# Patient Record
Sex: FEMALE | Race: WHITE | NOT HISPANIC OR LATINO | Employment: PART TIME | URBAN - NONMETROPOLITAN AREA
[De-identification: names, ages, dates, MRNs, and addresses within clinical notes are randomized per-mention and may not be internally consistent; named-entity substitution may affect disease eponyms.]

---

## 2017-01-19 ENCOUNTER — HOSPITAL ENCOUNTER (OUTPATIENT)
Dept: LAB | Facility: MEDICAL CENTER | Age: 52
End: 2017-01-19
Attending: INTERNAL MEDICINE
Payer: COMMERCIAL

## 2017-01-19 DIAGNOSIS — E78.5 DYSLIPIDEMIA: ICD-10-CM

## 2017-01-19 DIAGNOSIS — E05.90 HYPERTHYROIDISM: ICD-10-CM

## 2017-01-19 LAB
ALBUMIN SERPL BCP-MCNC: 3.9 G/DL (ref 3.2–4.9)
ALBUMIN/GLOB SERPL: 1.1 G/DL
ALP SERPL-CCNC: 52 U/L (ref 30–99)
ALT SERPL-CCNC: 17 U/L (ref 2–50)
ANION GAP SERPL CALC-SCNC: 8 MMOL/L (ref 0–11.9)
AST SERPL-CCNC: 13 U/L (ref 12–45)
BILIRUB SERPL-MCNC: 0.4 MG/DL (ref 0.1–1.5)
BUN SERPL-MCNC: 18 MG/DL (ref 8–22)
CALCIUM SERPL-MCNC: 8.7 MG/DL (ref 8.5–10.5)
CHLORIDE SERPL-SCNC: 107 MMOL/L (ref 96–112)
CHOLEST SERPL-MCNC: 128 MG/DL (ref 100–199)
CO2 SERPL-SCNC: 24 MMOL/L (ref 20–33)
CREAT SERPL-MCNC: 0.79 MG/DL (ref 0.5–1.4)
GLOBULIN SER CALC-MCNC: 3.4 G/DL (ref 1.9–3.5)
GLUCOSE SERPL-MCNC: 94 MG/DL (ref 65–99)
HDLC SERPL-MCNC: 27 MG/DL
LDLC SERPL CALC-MCNC: 44 MG/DL
POTASSIUM SERPL-SCNC: 4 MMOL/L (ref 3.6–5.5)
PROT SERPL-MCNC: 7.3 G/DL (ref 6–8.2)
SODIUM SERPL-SCNC: 139 MMOL/L (ref 135–145)
T3 SERPL-MCNC: 116.4 NG/DL (ref 60–181)
T4 FREE SERPL-MCNC: 0.78 NG/DL (ref 0.53–1.43)
TRIGL SERPL-MCNC: 285 MG/DL (ref 0–149)
TSH SERPL DL<=0.005 MIU/L-ACNC: 0.71 UIU/ML (ref 0.3–3.7)

## 2017-01-19 PROCEDURE — 80053 COMPREHEN METABOLIC PANEL: CPT

## 2017-01-19 PROCEDURE — 36415 COLL VENOUS BLD VENIPUNCTURE: CPT

## 2017-01-19 PROCEDURE — 84439 ASSAY OF FREE THYROXINE: CPT

## 2017-01-19 PROCEDURE — 84443 ASSAY THYROID STIM HORMONE: CPT

## 2017-01-19 PROCEDURE — 84480 ASSAY TRIIODOTHYRONINE (T3): CPT

## 2017-01-19 PROCEDURE — 80061 LIPID PANEL: CPT

## 2017-01-20 DIAGNOSIS — I10 ESSENTIAL HYPERTENSION: ICD-10-CM

## 2017-01-20 DIAGNOSIS — R52 BURNING PAIN: ICD-10-CM

## 2017-01-20 DIAGNOSIS — I73.00 RAYNAUD'S DISEASE WITHOUT GANGRENE: ICD-10-CM

## 2017-03-14 ENCOUNTER — HOSPITAL ENCOUNTER (OUTPATIENT)
Dept: LAB | Facility: MEDICAL CENTER | Age: 52
End: 2017-03-14
Attending: INTERNAL MEDICINE
Payer: COMMERCIAL

## 2017-03-14 ENCOUNTER — OFFICE VISIT (OUTPATIENT)
Dept: MEDICAL GROUP | Facility: PHYSICIAN GROUP | Age: 52
End: 2017-03-14
Payer: COMMERCIAL

## 2017-03-14 VITALS
TEMPERATURE: 97.2 F | OXYGEN SATURATION: 97 % | SYSTOLIC BLOOD PRESSURE: 124 MMHG | HEART RATE: 82 BPM | RESPIRATION RATE: 16 BRPM | WEIGHT: 293 LBS | BODY MASS INDEX: 47.09 KG/M2 | DIASTOLIC BLOOD PRESSURE: 74 MMHG | HEIGHT: 66 IN

## 2017-03-14 DIAGNOSIS — M25.542 ARTHRALGIA OF BOTH HANDS: ICD-10-CM

## 2017-03-14 DIAGNOSIS — E05.90 SUBCLINICAL HYPERTHYROIDISM: ICD-10-CM

## 2017-03-14 DIAGNOSIS — I10 ESSENTIAL HYPERTENSION: ICD-10-CM

## 2017-03-14 DIAGNOSIS — J45.40 ASTHMA IN ADULT, MODERATE PERSISTENT, UNCOMPLICATED: ICD-10-CM

## 2017-03-14 DIAGNOSIS — Z12.31 VISIT FOR SCREENING MAMMOGRAM: ICD-10-CM

## 2017-03-14 DIAGNOSIS — I73.00 RAYNAUD'S DISEASE WITHOUT GANGRENE: ICD-10-CM

## 2017-03-14 DIAGNOSIS — M25.541 ARTHRALGIA OF BOTH HANDS: ICD-10-CM

## 2017-03-14 DIAGNOSIS — Z12.11 SCREEN FOR COLON CANCER: ICD-10-CM

## 2017-03-14 DIAGNOSIS — R39.15 URINARY URGENCY: ICD-10-CM

## 2017-03-14 DIAGNOSIS — Z23 NEED FOR PNEUMOCOCCAL VACCINATION: ICD-10-CM

## 2017-03-14 DIAGNOSIS — E66.01 MORBID OBESITY WITH BMI OF 45.0-49.9, ADULT (HCC): ICD-10-CM

## 2017-03-14 DIAGNOSIS — R52 BURNING PAIN: ICD-10-CM

## 2017-03-14 LAB
CRP SERPL HS-MCNC: 1.76 MG/DL (ref 0–0.75)
ERYTHROCYTE [SEDIMENTATION RATE] IN BLOOD BY WESTERGREN METHOD: 20 MM/HOUR (ref 0–30)
RHEUMATOID FACT SERPL-ACNC: <10 IU/ML (ref 0–14)

## 2017-03-14 PROCEDURE — 85652 RBC SED RATE AUTOMATED: CPT

## 2017-03-14 PROCEDURE — 36415 COLL VENOUS BLD VENIPUNCTURE: CPT

## 2017-03-14 PROCEDURE — 86140 C-REACTIVE PROTEIN: CPT

## 2017-03-14 PROCEDURE — 86200 CCP ANTIBODY: CPT

## 2017-03-14 PROCEDURE — 90732 PPSV23 VACC 2 YRS+ SUBQ/IM: CPT | Performed by: INTERNAL MEDICINE

## 2017-03-14 PROCEDURE — 86431 RHEUMATOID FACTOR QUANT: CPT

## 2017-03-14 PROCEDURE — 99214 OFFICE O/P EST MOD 30 MIN: CPT | Mod: 25 | Performed by: INTERNAL MEDICINE

## 2017-03-14 PROCEDURE — 90471 IMMUNIZATION ADMIN: CPT | Performed by: INTERNAL MEDICINE

## 2017-03-14 RX ORDER — IBUPROFEN 800 MG/1
800 TABLET ORAL EVERY 8 HOURS PRN
Qty: 90 TAB | Refills: 3 | Status: SHIPPED | OUTPATIENT
Start: 2017-03-14

## 2017-03-14 RX ORDER — LOSARTAN POTASSIUM 50 MG/1
50 TABLET ORAL
Qty: 90 TAB | Refills: 3 | Status: SHIPPED | OUTPATIENT
Start: 2017-03-14 | End: 2017-05-11

## 2017-03-14 RX ORDER — HYDROCODONE BITARTRATE AND ACETAMINOPHEN 5; 325 MG/1; MG/1
1 TABLET ORAL EVERY 8 HOURS PRN
Qty: 60 TAB | Refills: 0 | Status: SHIPPED | OUTPATIENT
Start: 2017-03-14 | End: 2017-12-12

## 2017-03-14 ASSESSMENT — PATIENT HEALTH QUESTIONNAIRE - PHQ9: CLINICAL INTERPRETATION OF PHQ2 SCORE: 0

## 2017-03-14 NOTE — ASSESSMENT & PLAN NOTE
Patient has had problems with urinary urgency. Ditropan is no longer working for her. We discussed referral to urogynecology.

## 2017-03-14 NOTE — PROGRESS NOTES
Chief Complaint   Patient presents with   • Results     fv labs, advil, norco, losartan refill       HISTORY OF PRESENT ILLNESS: Patient is a 51 y.o. female established patient who presents today to be seen for acute and chronic issues.    Joint pain  Patient is a 51-year-old female who comes in today with multiple concerns. She notes that recently she's been waking up with worsening pain of her hands bilaterally and also in her knees. She has an interesting history of autoimmune urticaria. Patient also has a family history of rheumatoid arthritis in her mother. The joints that seems to be most affected are the MCP joints of her hands bilaterally. She notes that can be swollen but they're not typically red. It takes about an hour for them to loosen up. We discussed doing an x-ray of her hands, rheumatoid factor, anti-CCP antibody to assess for rheumatologic cause.    Patient has rare use of hydrocodone. She was last given #120 tabs 2 years ago. I did give her refill today for #60 tabs.    Subclinical hyperthyroidism  Patient has subclinical hyperthyroidism which we have followed. He sent thyroid studies were normal off of medication.    Hypertension  This is a chronic condition which is well controlled on medications. Patient is tolerating medications without side effects.    Urinary urgency  Patient has had problems with urinary urgency. Ditropan is no longer working for her. We discussed referral to urogynecology.    Morbid obesity with BMI of 45.0-49.9, adult ([facility])  Patient is morbidly obese. We discussed continuing to work on dietary changes.      Patient Active Problem List    Diagnosis Date Noted   • Morbid obesity with BMI of 45.0-49.9, adult ([facility]) 03/14/2017   • Chronic cough 12/22/2015   • Urinary urgency 09/22/2015   • Burning pain 09/22/2015   • Graves disease 02/12/2015   • Subclinical hyperthyroidism 07/31/2014   • Multiple thyroid nodules 02/02/2014   • Joint pain 08/27/2013   • Urticaria, chronic  2013   • Hypertension 2013   • Asthma in adult 2013   • Shoulder pain 2013       Allergies:Erythromycin; Keflex; and Pcn    Current Outpatient Prescriptions Ordered in [facility]   Medication Sig Dispense Refill   • ibuprofen (MOTRIN) 800 MG Tab Take 1 Tab by mouth every 8 hours as needed for Mild Pain. 90 Tab 3   • losartan (COZAAR) 50 MG Tab Take 1 Tab by mouth every day. 90 Tab 3   • hydrocodone-acetaminophen (NORCO) 5-325 MG Tab per tablet Take 1 Tab by mouth every 8 hours as needed. 60 Tab 0   • NIFEdipine (ADALAT CC) 60 MG CR tablet Take 1 Tab by mouth every day. 90 Tab 3   • omeprazole (PRILOSEC) 20 MG delayed-release capsule Take 1 Cap by mouth every day. 90 Cap 3   • oxybutynin (DITROPAN) 5 MG Tab Take 1 Tab by mouth 3 times a day. 270 Tab 1   • Albuterol Sulfate (PROAIR HFA INH) Inhale  by mouth.     • beclomethasone (QVAR) 80 MCG/ACT inhaler Inhale 1 Puff by mouth 2 times a day.       • Azelastine-Fluticasone (DYMISTA) 137-50 MCG/ACT Suspension Spray 1 Spray in nose 2 Times a Day. 1 Bottle 0   • meclizine (ANTIVERT) 25 MG Tab Take 1 Tab by mouth 3 times a day as needed for Dizziness. 30 Tab 0   • sennosides-docusate sodium (SENOKOT-S) 8.6-50 MG tablet Take 1 Tab by mouth 2 times a day. 20 Tab 0   • DYMISTA 137-50 MCG/ACT Suspension   6   • guaifenesin-codeine (ROBITUSSIN AC) Solution Take 5 mL by mouth every 6 hours as needed for Cough. 120 mL 0     No current Epic-ordered facility-administered medications on file.       Past Medical History   Diagnosis Date   • Urticaria, chronic 1/3/2013   • Hypertension    • Bursitis of shoulder, left    • Rotator cuff tear, left        Social History   Substance Use Topics   • Smoking status: Former Smoker   • Smokeless tobacco: Never Used   • Alcohol Use: 0.0 oz/week     0 Standard drinks or equivalent per week      Comment: Occassionally       Family Status   Relation Status Death Age   • Mother  77   • Father  86   • Maternal  "Grandmother     • Maternal Grandfather     • Child Alive      Family History   Problem Relation Age of Onset   • Arthritis Mother      RA   • Cancer Mother      breast   • Hypertension Father    • Cancer Maternal Grandmother        ROS:  Review of Systems   Constitutional: Negative for fever and malaise/fatigue.   HENT: Negative for congestion  Respiratory: Negative for cough  Cardiovascular: Negative for chest pain  Gastrointestinal: Negative for nausea, vomiting and abdominal pain.  Musculoskeletal: Positive for joint pain  All other systems reviewed and are negative except as in HPI.      Exam:  Blood pressure 124/74, pulse 82, temperature 36.2 °C (97.2 °F), resp. rate 16, height 1.689 m (5' 6.5\"), weight 138.347 kg (305 lb), SpO2 97 %.  General: Morbidly obese female in NAD  Head is grossly normal.  Neck: Supple without JVD   Pulmonary: Clear to ausculation and percussion.  Normal effort. No rales, ronchi, or wheezing.  Cardiovascular: Regular rate and rhythm without murmur. Carotid and radial pulses are intact and equal bilaterally.  Extremities: no clubbing, cyanosis, or edema.    No visits with results within 1 Month(s) from this visit.  Latest known visit with results is:    Hospital Outpatient Visit on 2017   Component Date Value Ref Range Status   • TSH 2017 0.710  0.300 - 3.700 uIU/mL Final   • Free T-4 2017 0.78  0.53 - 1.43 ng/dL Final   • T3 2017 116.4  60.0 - 181.0 ng/dL Final   • Sodium 2017 139  135 - 145 mmol/L Final   • Potassium 2017 4.0  3.6 - 5.5 mmol/L Final   • Chloride 2017 107  96 - 112 mmol/L Final   • Co2 2017 24  20 - 33 mmol/L Final   • Anion Gap 2017 8.0  0.0 - 11.9 Final   • Glucose 2017 94  65 - 99 mg/dL Final   • Bun 2017 18  8 - 22 mg/dL Final   • Creatinine 2017 0.79  0.50 - 1.40 mg/dL Final   • Calcium 2017 8.7  8.5 - 10.5 mg/dL Final   • AST(SGOT) 2017 13  12 - 45 U/L Final   • " ALT(SGPT) 01/19/2017 17  2 - 50 U/L Final   • Alkaline Phosphatase 01/19/2017 52  30 - 99 U/L Final   • Total Bilirubin 01/19/2017 0.4  0.1 - 1.5 mg/dL Final   • Albumin 01/19/2017 3.9  3.2 - 4.9 g/dL Final   • Total Protein 01/19/2017 7.3  6.0 - 8.2 g/dL Final   • Globulin 01/19/2017 3.4  1.9 - 3.5 g/dL Final   • A-G Ratio 01/19/2017 1.1   Final   • Cholesterol,Tot 01/19/2017 128  100 - 199 mg/dL Final   • Triglycerides 01/19/2017 285* 0 - 149 mg/dL Final   • HDL 01/19/2017 27* >=40 mg/dL Final   • LDL 01/19/2017 44  <100 mg/dL Final   • GFR If  01/19/2017 >60  >60 mL/min/1.73 m 2 Final   • GFR If Non  01/19/2017 >60  >60 mL/min/1.73 m 2 Final       Assessment/Plan:  1. Arthralgia of both hands  RHEUMATOID ARTHRITIS FACTOR    CCP ANTIBODY    WESTERGREN SED RATE    CRP QUANTITIVE (NON-CARDIAC)    DX-JOINT SURVEY-HANDS SINGLE VIEW    ibuprofen (MOTRIN) 800 MG Tab    hydrocodone-acetaminophen (NORCO) 5-325 MG Tab per tablet    Uncontrolled, will check labs   2. Essential hypertension  losartan (COZAAR) 50 MG Tab    Controlled, on medication   3. Urinary urgency  REFERRAL TO UROGYNECOLOGY    Uncontrolled, referral to urogynecology   4. Burning pain      Uncontrolled, potentially Raynolds. Will try increasing calcium channel blocker   5. Raynaud's disease without gangrene     6. Visit for screening mammogram  MA-SCREEN MAMMO W/CAD-BILAT   7. Screen for colon cancer  REFERRAL TO GASTROENTEROLOGY   8. Subclinical hyperthyroidism     9. Asthma in adult, moderate persistent, uncomplicated     10. Need for pneumococcal vaccination  PneumoVax PPV23 =>1yo   11. Morbid obesity with BMI of 45.0-49.9, adult ([facility])  Patient identified as having weight management issue.  Appropriate orders and counseling given.     Please note that this dictation was created using voice recognition software. I have made every reasonable attempt to correct obvious errors, but I expect that there are errors of  grammar and possibly content that I did not discover before finalizing the note.

## 2017-03-14 NOTE — PATIENT INSTRUCTIONS
1. Referred for colonoscopy for this summer.    2. Mammogram when you can.    3. Joint pain labs at your convenience.    4. Medications refilled.    5. Follow up in 1 year or sooner as needed.

## 2017-03-14 NOTE — ASSESSMENT & PLAN NOTE
Patient is a 51-year-old female who comes in today with multiple concerns. She notes that recently she's been waking up with worsening pain of her hands bilaterally and also in her knees. She has an interesting history of autoimmune urticaria. Patient also has a family history of rheumatoid arthritis in her mother. The joints that seems to be most affected are the MCP joints of her hands bilaterally. She notes that can be swollen but they're not typically red. It takes about an hour for them to loosen up. We discussed doing an x-ray of her hands, rheumatoid factor, anti-CCP antibody to assess for rheumatologic cause.    Patient has rare use of hydrocodone. She was last given #120 tabs 2 years ago. I did give her refill today for #60 tabs.

## 2017-03-14 NOTE — MR AVS SNAPSHOT
"        Gabriella Clifford   3/14/2017 7:40 AM   Office Visit   MRN: [id number]    Department:  [facility]   Dept Phone:  [phone]    Description:  Female : 1965   Provider:  Sarah Dunn M.D.           Reason for Visit     Results fv labs, advil, norco, losartan refill      Allergies as of 3/14/2017     Allergen Noted Reactions    Erythromycin 2013       Keflex 2013       Pcn [Penicillins] 2013         You were diagnosed with     Arthralgia of both hands   [6445124]       Essential hypertension   [7538794]       Urinary urgency   [109450]       Burning pain   [743004]   Uncontrolled, potentially Raynolds. Will try increasing calcium channel blocker    Raynaud's disease without gangrene   [6639548]       Visit for screening mammogram   [135040]       Screen for colon cancer   [699175]       Subclinical hyperthyroidism   [207117]       Asthma in adult, moderate persistent, uncomplicated   [4536770]       Need for pneumococcal vaccination   [502871]         Vital Signs     Blood Pressure Pulse Temperature Respirations Height Weight    124/74 mmHg 82 36.2 °C (97.2 °F) 16 1.689 m (5' 6.5\") 138.347 kg (305 lb)    Body Mass Index Oxygen Saturation Smoking Status             48.50 kg/m2 97% Former Smoker         Basic Information     Date Of Birth Sex Race Ethnicity Preferred Language    1965 Female White Non- English      Your appointments     2017  1:00 PM   NEW TO YOU with VALERIE Lopez   [address] [address] [phone]              Problem List              ICD-10-CM Priority Class Noted - Resolved    Urticaria, chronic L50.8   1/3/2013 - Present    Hypertension I10   1/3/2013 - Present    Asthma in adult J45.909   1/3/2013 - Present    Shoulder pain M25.519   1/3/2013 - Present    Joint pain M25.50   2013 - Present    Hyperthyroidism E05.90   2014 - Present    Multiple " thyroid nodules E04.2   2/2/2014 - Present    Subclinical hyperthyroidism E05.90   7/31/2014 - Present    Graves disease E05.00   2/12/2015 - Present    Urinary urgency R39.15   9/22/2015 - Present    Burning pain R52   9/22/2015 - Present    Chronic cough R05   12/22/2015 - Present      Health Maintenance        Date Due Completion Dates    IMM DTaP/Tdap/Td Vaccine (1 - Tdap) 8/9/1985 ---    IMM PNEUMOCOCCAL 19-64 (ADULT) MEDIUM RISK SERIES (1 of 1 - PPSV23) 8/9/1985 ---    MAMMOGRAM 4/30/2015 4/30/2014, 1/17/2013, 1/3/2005 (N/S)    Override on 1/3/2005: (N/S)    COLONOSCOPY 8/9/2016 ---    IMM INFLUENZA (1) 9/1/2016 10/15/2014, 10/30/2013    PAP SMEAR 3/2/2017 3/2/2014 (Prv Comp)    Override on 3/2/2014: Previously completed            Current Immunizations     Influenza TIV (IM) 10/30/2013  7:51 AM    Influenza Vaccine Quad Inj (Pf) 10/15/2014    Pneumococcal polysaccharide vaccine (PPSV-23)  Incomplete      Below and/or attached are the medications your provider expects you to take. Review all of your home medications and newly ordered medications with your provider and/or pharmacist. Follow medication instructions as directed by your provider and/or pharmacist. Please keep your medication list with you and share with your provider. Update the information when medications are discontinued, doses are changed, or new medications (including over-the-counter products) are added; and carry medication information at all times in the event of emergency situations     Allergies:  ERYTHROMYCIN - (reactions not documented)     KEFLEX - (reactions not documented)     PCN - (reactions not documented)               Medications  Valid as of: March 14, 2017 -  8:10 AM    Generic Name Brand Name Tablet Size Instructions for use    Albuterol Sulfate   Inhale  by mouth.        Azelastine-Fluticasone (Suspension) DYMISTA 137-50 MCG/ACT         Azelastine-Fluticasone (Suspension) Azelastine-Fluticasone 137-50 MCG/ACT Spray 1 Spray  in nose 2 Times a Day.        Beclomethasone Dipropionate (Aero Soln) QVAR 80 MCG/ACT Inhale 1 Puff by mouth 2 times a day.          Guaifenesin-Codeine (Solution) ROBITUSSIN -10 mg/5mL Take 5 mL by mouth every 6 hours as needed for Cough.        Hydrocodone-Acetaminophen (Tab) NORCO 5-325 MG Take 1 Tab by mouth every 8 hours as needed.        Ibuprofen (Tab) MOTRIN 800 MG Take 1 Tab by mouth every 8 hours as needed for Mild Pain.        Losartan Potassium (Tab) COZAAR 50 MG Take 1 Tab by mouth every day.        Meclizine HCl (Tab) ANTIVERT 25 MG Take 1 Tab by mouth 3 times a day as needed for Dizziness.        NIFEdipine (TABLET SR 24 HR) ADALAT CC 60 MG Take 1 Tab by mouth every day.        Omeprazole (CAPSULE DELAYED RELEASE) PRILOSEC 20 MG Take 1 Cap by mouth every day.        Oxybutynin Chloride (Tab) DITROPAN 5 MG Take 1 Tab by mouth 3 times a day.        Sennosides-Docusate Sodium (Tab) SENOKOT-S 8.6-50 MG Take 1 Tab by mouth 2 times a day.        .                 Medicines prescribed today were sent to:     [id number] [address] [place] [address] [id number]  AT [address] [place] [id number] [facility] [address]    Phone: [phone] Fax: [phone]    Open 24 Hours?: No      Medication refill instructions:       If your prescription bottle indicates you have medication refills left, it is not necessary to call your provider’s office. Please contact your pharmacy and they will refill your medication.    If your prescription bottle indicates you do not have any refills left, you may request refills at any time through one of the following ways: The online [employer] system (except Urgent Care), by calling your provider’s office, or by asking your pharmacy to contact your provider’s office with a refill request. Medication refills are processed only during regular business hours and may not be available until the next business day. Your provider may request additional  information or to have a follow-up visit with you prior to refilling your medication.   *Please Note: Medication refills are assigned a new Rx number when refilled electronically. Your pharmacy may indicate that no refills were authorized even though a new prescription for the same medication is available at the pharmacy. Please request the medicine by name with the pharmacy before contacting your provider for a refill.        Your To Do List     Future Labs/Procedures Complete By Expires    CCP ANTIBODY  As directed 9/11/2017    CRP QUANTITIVE (NON-CARDIAC)  As directed 9/11/2017    DX-JOINT SURVEY-HANDS SINGLE VIEW  As directed 3/14/2018    MA-SCREEN MAMMO W/CAD-BILAT  As directed 3/15/2018    RHEUMATOID ARTHRITIS FACTOR  As directed 3/14/2018    WESTERGREN SED RATE  As directed 3/14/2018      Referral     A referral request has been sent to our patient care coordination department. Please allow 3-5 business days for us to process this request and contact you either by phone or mail. If you do not hear from us by the 5th business day, please call us at [phone].        Instructions    1. Referred for colonoscopy for this summer.    2. Mammogram when you can.    3. Joint pain labs at your convenience.    4. Medications refilled.    5. Follow up in 1 year or sooner as needed.       Other Notes About Your Plan     Last UDS:  2/12/15  DR RICCI  Contolled Substance agreement signed:  2/12/15  DR KEIRY Sheriff Access Code: Activation code not generated  Current [employer] Status: Active

## 2017-03-14 NOTE — ASSESSMENT & PLAN NOTE
Patient has subclinical hyperthyroidism which we have followed. He sent thyroid studies were normal off of medication.

## 2017-03-16 ENCOUNTER — HOSPITAL ENCOUNTER (OUTPATIENT)
Dept: RADIOLOGY | Facility: MEDICAL CENTER | Age: 52
End: 2017-03-16
Attending: INTERNAL MEDICINE
Payer: COMMERCIAL

## 2017-03-16 DIAGNOSIS — M25.541 ARTHRALGIA OF BOTH HANDS: ICD-10-CM

## 2017-03-16 DIAGNOSIS — M25.542 ARTHRALGIA OF BOTH HANDS: ICD-10-CM

## 2017-03-16 LAB — CCP IGG SERPL-ACNC: 3 UNITS (ref 0–19)

## 2017-03-16 PROCEDURE — 77077 JOINT SURVEY SINGLE VIEW: CPT

## 2017-03-18 ENCOUNTER — PATIENT MESSAGE (OUTPATIENT)
Dept: MEDICAL GROUP | Facility: PHYSICIAN GROUP | Age: 52
End: 2017-03-18

## 2017-03-22 ENCOUNTER — TELEPHONE (OUTPATIENT)
Dept: MEDICAL GROUP | Facility: PHYSICIAN GROUP | Age: 52
End: 2017-03-22

## 2017-03-22 DIAGNOSIS — M25.541 ARTHRALGIA OF BOTH HANDS: ICD-10-CM

## 2017-03-22 DIAGNOSIS — M25.542 ARTHRALGIA OF BOTH HANDS: ICD-10-CM

## 2017-03-22 DIAGNOSIS — R79.82 ELEVATED C-REACTIVE PROTEIN (CRP): ICD-10-CM

## 2017-03-22 NOTE — TELEPHONE ENCOUNTER
Patient called regarding the [employer] message she sent on 3/18/17. Please review.

## 2017-03-23 ENCOUNTER — PATIENT MESSAGE (OUTPATIENT)
Dept: MEDICAL GROUP | Facility: PHYSICIAN GROUP | Age: 52
End: 2017-03-23

## 2017-03-23 NOTE — TELEPHONE ENCOUNTER
From: Gabriella Clifford  To: Sarah Dunn M.D.  Sent: 3/18/2017 8:32 AM PDT  Subject: Test Result Question    If my test results are not likely to be RA then what is this one? Is it from a different autoimmune issue?     Stat C-Reactive Protein 1.76 mg/dL 0.00 - 0.75 mg/dL H    My graves, asthma, or autoimmune urticaria? Osteoarthritis says that joints swell or get painful after extended activity. I wake up with swollen and red painful knuckles and other joints with after no activity. When it is windy or when the pressure is changing due to storms it is worse.

## 2017-03-23 NOTE — TELEPHONE ENCOUNTER
Please inform patient that I reviewed her labs and x-ray. Patient was concerned regarding autoimmune disorder. Her joint survey was negative. Her CRP was elevated. Which indicates inflammation.   It remains that she has joint pain without evidence of joint destruction which is a good thing. This is related to her autoimmune - I am not sure. It is a fine point. I would like her to see rheumatology in the future. And please tell her I am happy to make the referral if that's she wants. As Dr. Dunn will not be here for follow-up.

## 2017-03-24 NOTE — TELEPHONE ENCOUNTER
From: Airam Clifford  To: NAT Monahan  Sent: 3/23/2017 7:39 PM PDT  Subject: Test Result Question    I spoke with your assistant. As I told her, yes I would like that referral. Thank you so much!!!  ----- Message -----  From: NAT Monahan  Sent: 3/23/2017 2:51 PM PDT  To: Airam Clifford  Subject: RE: Test Result Question    Dear Airam,  As you well know  is on maternity leave. We're trying to cover her messages currently. I have sent a message to the medical assistant to call you, but will try to address here in this message.  It is unclear why your CRP is elevated. It could be related to many factors. It appears that you do not have any single joint destruction despite having the pain which is a good thing. If you would like to be referred to rheumatology who can explore more of the auto immune properties I would be happy to do that for you.  Laney Christie      ----- Message -----   From: AIRAM CLIFFORD   Sent: 3/18/2017 8:32 AM PDT   To: Sarah Dunn M.D.  Subject: Test Result Question    If my test results are not likely to be RA then what is this one? Is it from a different autoimmune issue?     Stat C-Reactive Protein 1.76 mg/dL 0.00 - 0.75 mg/dL H    My graves, asthma, or autoimmune urticaria? Osteoarthritis says that joints swell or get painful after extended activity. I wake up with swollen and red painful knuckles and other joints with after no activity. When it is windy or when the pressure is changing due to storms it is worse.

## 2017-04-18 ENCOUNTER — APPOINTMENT (OUTPATIENT)
Dept: RADIOLOGY | Facility: IMAGING CENTER | Age: 52
End: 2017-04-18
Attending: INTERNAL MEDICINE
Payer: COMMERCIAL

## 2017-04-18 ENCOUNTER — OFFICE VISIT (OUTPATIENT)
Dept: RHEUMATOLOGY | Facility: PHYSICIAN GROUP | Age: 52
End: 2017-04-18
Payer: COMMERCIAL

## 2017-04-18 ENCOUNTER — NON-PROVIDER VISIT (OUTPATIENT)
Dept: URGENT CARE | Facility: PHYSICIAN GROUP | Age: 52
End: 2017-04-18
Payer: COMMERCIAL

## 2017-04-18 ENCOUNTER — HOSPITAL ENCOUNTER (OUTPATIENT)
Dept: LAB | Facility: MEDICAL CENTER | Age: 52
End: 2017-04-18
Attending: INTERNAL MEDICINE
Payer: COMMERCIAL

## 2017-04-18 VITALS
HEART RATE: 76 BPM | TEMPERATURE: 98.9 F | HEIGHT: 67 IN | WEIGHT: 293 LBS | RESPIRATION RATE: 16 BRPM | OXYGEN SATURATION: 93 % | BODY MASS INDEX: 45.99 KG/M2 | DIASTOLIC BLOOD PRESSURE: 90 MMHG | SYSTOLIC BLOOD PRESSURE: 132 MMHG

## 2017-04-18 DIAGNOSIS — I10 ESSENTIAL HYPERTENSION: ICD-10-CM

## 2017-04-18 DIAGNOSIS — M25.50 POLYARTHRALGIA: ICD-10-CM

## 2017-04-18 DIAGNOSIS — Z79.899 ENCOUNTER FOR LONG-TERM (CURRENT) USE OF HIGH-RISK MEDICATION: ICD-10-CM

## 2017-04-18 DIAGNOSIS — E05.00 GRAVES DISEASE: ICD-10-CM

## 2017-04-18 DIAGNOSIS — I10 UNSPECIFIED ESSENTIAL HYPERTENSION: ICD-10-CM

## 2017-04-18 DIAGNOSIS — R70.0 ESR RAISED: ICD-10-CM

## 2017-04-18 DIAGNOSIS — R79.82 CRP ELEVATED: ICD-10-CM

## 2017-04-18 LAB
ALBUMIN SERPL BCP-MCNC: 4.1 G/DL (ref 3.2–4.9)
ALBUMIN/GLOB SERPL: 1.2 G/DL
ALP SERPL-CCNC: 55 U/L (ref 30–99)
ALT SERPL-CCNC: 20 U/L (ref 2–50)
ANION GAP SERPL CALC-SCNC: 11 MMOL/L (ref 0–11.9)
APPEARANCE UR: CLEAR
AST SERPL-CCNC: 14 U/L (ref 12–45)
BILIRUB SERPL-MCNC: 0.3 MG/DL (ref 0.1–1.5)
BILIRUB UR QL STRIP.AUTO: NEGATIVE
BUN SERPL-MCNC: 22 MG/DL (ref 8–22)
C3 SERPL-MCNC: 184 MG/DL (ref 87–200)
C4 SERPL-MCNC: 48 MG/DL (ref 19–52)
CALCIUM SERPL-MCNC: 9.5 MG/DL (ref 8.5–10.5)
CHLORIDE SERPL-SCNC: 104 MMOL/L (ref 96–112)
CO2 SERPL-SCNC: 24 MMOL/L (ref 20–33)
COLOR UR: NORMAL
CREAT SERPL-MCNC: 0.86 MG/DL (ref 0.5–1.4)
CRP SERPL HS-MCNC: 1.76 MG/DL (ref 0–0.75)
GFR SERPL CREATININE-BSD FRML MDRD: >60 ML/MIN/1.73 M 2
GLOBULIN SER CALC-MCNC: 3.3 G/DL (ref 1.9–3.5)
GLUCOSE SERPL-MCNC: 155 MG/DL (ref 65–99)
GLUCOSE UR STRIP.AUTO-MCNC: NEGATIVE MG/DL
KETONES UR STRIP.AUTO-MCNC: NEGATIVE MG/DL
LEUKOCYTE ESTERASE UR QL STRIP.AUTO: NEGATIVE
MICRO URNS: NORMAL
NITRITE UR QL STRIP.AUTO: NEGATIVE
PH UR STRIP.AUTO: 5.5 [PH]
POTASSIUM SERPL-SCNC: 3.5 MMOL/L (ref 3.6–5.5)
PROT SERPL-MCNC: 7.4 G/DL (ref 6–8.2)
PROT UR QL STRIP: NEGATIVE MG/DL
RBC UR QL AUTO: NEGATIVE
SODIUM SERPL-SCNC: 139 MMOL/L (ref 135–145)
SP GR UR STRIP.AUTO: 1.02
URATE SERPL-MCNC: 3.1 MG/DL (ref 1.9–8.2)

## 2017-04-18 PROCEDURE — 85652 RBC SED RATE AUTOMATED: CPT

## 2017-04-18 PROCEDURE — 81003 URINALYSIS AUTO W/O SCOPE: CPT

## 2017-04-18 PROCEDURE — 86255 FLUORESCENT ANTIBODY SCREEN: CPT

## 2017-04-18 PROCEDURE — 86235 NUCLEAR ANTIGEN ANTIBODY: CPT | Mod: 91

## 2017-04-18 PROCEDURE — 84550 ASSAY OF BLOOD/URIC ACID: CPT

## 2017-04-18 PROCEDURE — 86225 DNA ANTIBODY NATIVE: CPT

## 2017-04-18 PROCEDURE — 80053 COMPREHEN METABOLIC PANEL: CPT

## 2017-04-18 PROCEDURE — 86160 COMPLEMENT ANTIGEN: CPT

## 2017-04-18 PROCEDURE — 86747 PARVOVIRUS ANTIBODY: CPT | Mod: 91

## 2017-04-18 PROCEDURE — 86332 IMMUNE COMPLEX ASSAY: CPT

## 2017-04-18 PROCEDURE — 82955 ASSAY OF G6PD ENZYME: CPT

## 2017-04-18 PROCEDURE — 85025 COMPLETE CBC W/AUTO DIFF WBC: CPT

## 2017-04-18 PROCEDURE — 83516 IMMUNOASSAY NONANTIBODY: CPT

## 2017-04-18 PROCEDURE — 99244 OFF/OP CNSLTJ NEW/EST MOD 40: CPT | Performed by: INTERNAL MEDICINE

## 2017-04-18 PROCEDURE — 86038 ANTINUCLEAR ANTIBODIES: CPT

## 2017-04-18 PROCEDURE — 86162 COMPLEMENT TOTAL (CH50): CPT

## 2017-04-18 PROCEDURE — 86140 C-REACTIVE PROTEIN: CPT

## 2017-04-18 PROCEDURE — 36415 COLL VENOUS BLD VENIPUNCTURE: CPT

## 2017-04-18 PROCEDURE — 77077 JOINT SURVEY SINGLE VIEW: CPT | Mod: 26 | Performed by: EMERGENCY MEDICINE

## 2017-04-18 NOTE — MR AVS SNAPSHOT
"        Gabriella Clifford   2017 1:30 PM   Office Visit   MRN: [id number]    Department:  Rheumatology Med [facility]   Dept Phone:  [phone]    Description:  Female : 1965   Provider:  Maylin Willingham M.D.           Reason for Visit     New Patient new patient       Allergies as of 2017     Allergen Noted Reactions    Erythromycin 2013       Keflex 2013       Pcn [Penicillins] 2013         You were diagnosed with     Essential hypertension   [5211228]       Graves disease   [777980]       ESR raised   [983293]       CRP elevated   [899847]       Polyarthralgia   [371163]       Encounter for long-term (current) use of high-risk medication   [637970]         Vital Signs     Blood Pressure Pulse Temperature Respirations Height Weight    132/90 mmHg 76 37.2 °C (98.9 °F) 16 1.689 m (5' 6.5\") 141.069 kg (311 lb)    Body Mass Index Oxygen Saturation Last Menstrual Period Breastfeeding? Smoking Status       49.45 kg/m2 93% 2016 No Former Smoker       Basic Information     Date Of Birth Sex Race Ethnicity Preferred Language    1965 Female White Non- English      Your appointments     2017  1:00 PM   NEW TO YOU with VALERIE Lopez   [facility]    [address] [place] [address] [phone]            May 08, 2017  1:30 PM   Follow Up Visit with Maylin Willingham M.D.   [facility] (--)    [address]  [facility] [address] [phone]           You will be receiving a confirmation call a few days before your appointment from our automated call confirmation system.            May 23, 2017  1:00 PM   Telemedicine Clinic New Patient with Brenda Louis M.D., TELEMED GYN, TELEMEDICINE [place]   Centralized Scheduling (--)    [address]  [facility] [address] [phone]              Problem List              ICD-10-CM Priority Class Noted - Resolved    Urticaria, chronic L50.8   1/3/2013 - " Present    Hypertension I10   1/3/2013 - Present    Asthma in adult J45.909   1/3/2013 - Present    Shoulder pain M25.519   1/3/2013 - Present    Joint pain M25.50   8/27/2013 - Present    Multiple thyroid nodules E04.2   2/2/2014 - Present    Subclinical hyperthyroidism E05.90   7/31/2014 - Present    Graves disease E05.00   2/12/2015 - Present    Urinary urgency R39.15   9/22/2015 - Present    Burning pain R52   9/22/2015 - Present    Chronic cough R05   12/22/2015 - Present    Morbid obesity with BMI of 45.0-49.9, adult ([facility]) E66.01, Z68.42   3/14/2017 - Present      Health Maintenance        Date Due Completion Dates    IMM DTaP/Tdap/Td Vaccine (1 - Tdap) 8/9/1985 ---    MAMMOGRAM 4/30/2015 4/30/2014, 1/17/2013, 1/3/2005 (N/S)    Override on 1/3/2005: (N/S)    COLONOSCOPY 8/9/2016 ---    PAP SMEAR 3/2/2017 3/2/2014 (Prv Comp)    Override on 3/2/2014: Previously completed            Current Immunizations     Influenza TIV (IM) 10/30/2013  7:51 AM    Influenza Vaccine Quad Inj (Pf) 10/15/2014    Pneumococcal polysaccharide vaccine (PPSV-23) 3/14/2017      Below and/or attached are the medications your provider expects you to take. Review all of your home medications and newly ordered medications with your provider and/or pharmacist. Follow medication instructions as directed by your provider and/or pharmacist. Please keep your medication list with you and share with your provider. Update the information when medications are discontinued, doses are changed, or new medications (including over-the-counter products) are added; and carry medication information at all times in the event of emergency situations     Allergies:  ERYTHROMYCIN - (reactions not documented)     KEFLEX - (reactions not documented)     PCN - (reactions not documented)               Medications  Valid as of: April 18, 2017 -  2:15 PM    Generic Name Brand Name Tablet Size Instructions for use    Albuterol Sulfate   Inhale  by mouth.         Azelastine-Fluticasone (Suspension) DYMISTA 137-50 MCG/ACT         Azelastine-Fluticasone (Suspension) Azelastine-Fluticasone 137-50 MCG/ACT Spray 1 Spray in nose 2 Times a Day.        Beclomethasone Dipropionate (Aero Soln) QVAR 80 MCG/ACT Inhale 1 Puff by mouth 2 times a day.          Guaifenesin-Codeine (Solution) ROBITUSSIN -10 mg/5mL Take 5 mL by mouth every 6 hours as needed for Cough.        Hydrocodone-Acetaminophen (Tab) NORCO 5-325 MG Take 1 Tab by mouth every 8 hours as needed.        Ibuprofen (Tab) MOTRIN 800 MG Take 1 Tab by mouth every 8 hours as needed for Mild Pain.        Losartan Potassium (Tab) COZAAR 50 MG Take 1 Tab by mouth every day.        Meclizine HCl (Tab) ANTIVERT 25 MG Take 1 Tab by mouth 3 times a day as needed for Dizziness.        NIFEdipine (TABLET SR 24 HR) ADALAT CC 60 MG Take 1 Tab by mouth every day.        Omeprazole (CAPSULE DELAYED RELEASE) PRILOSEC 20 MG Take 1 Cap by mouth every day.        Oxybutynin Chloride (Tab) DITROPAN 5 MG Take 1 Tab by mouth 3 times a day.        Sennosides-Docusate Sodium (Tab) SENOKOT-S 8.6-50 MG Take 1 Tab by mouth 2 times a day.        .                 Medicines prescribed today were sent to:     [facility] [id number] [place] [address] [address] [place] [address] AT [facility] [id number] [facility] [address]    Phone: [phone] Fax: [phone]    Open 24 Hours?: No      Medication refill instructions:       If your prescription bottle indicates you have medication refills left, it is not necessary to call your provider’s office. Please contact your pharmacy and they will refill your medication.    If your prescription bottle indicates you do not have any refills left, you may request refills at any time through one of the following ways: The online [employer] system (except Urgent Care), by calling your provider’s office, or by asking your pharmacy to contact your provider’s office with a refill request.  Medication refills are processed only during regular business hours and may not be available until the next business day. Your provider may request additional information or to have a follow-up visit with you prior to refilling your medication.   *Please Note: Medication refills are assigned a new Rx number when refilled electronically. Your pharmacy may indicate that no refills were authorized even though a new prescription for the same medication is available at the pharmacy. Please request the medicine by name with the pharmacy before contacting your provider for a refill.        Your To Do List     Future Labs/Procedures Complete By Expires    CRP QUANTITIVE (NON-CARDIAC)  4/30/2017 4/18/2018    AFRICA TITER  As directed 4/18/2018    ANTI SCL-70 ABS  As directed 4/18/2018    ANTI-DNA (DS)  As directed 4/18/2018    ANTI-SANJUANA PANEL  As directed 4/18/2018    ANTI-NEUTROPHIL CYTOPLASMIC AB  As directed 4/18/2018    ANTI-PROTEINASE 3 (NJ-3) ABS  As directed 4/18/2018    CBC WITH DIFFERENTIAL  As directed 4/18/2018    CENTROMERE AB, IGG  As directed 4/18/2018    COMP METABOLIC PANEL  As directed 4/18/2018    COMPLEMENT C3  As directed 4/18/2018    COMPLEMENT C4  As directed 4/18/2018    COMPLEMENT TOTAL (CH50)  As directed 4/18/2018    DX-JOINT SURVEY-FEET SINGLE VIEW  As directed 4/18/2018    G6PD QUANT + RBC  As directed 4/18/2018    PARVOVIRUS B19 HUMAN IGG/IGM SERUM  As directed 4/18/2018    URIC ACID  As directed 4/18/2018    URINALYSIS  As directed 4/18/2018    WESTERGREN SED RATE  As directed 4/18/2018      Other Notes About Your Plan     Last UDS:  2/12/15  DR RICCI  Contolled Substance agreement signed:  2/12/15  DR RICCI               [employer] Access Code: Activation code not generated  Current [employer] Status: Active

## 2017-04-18 NOTE — Clinical Note
[facility]   [address], [address]  [place] [address]  Phone: [phone]  Fax: [phone]              Encounter Date: 4/18/2017    Dear Dr. Dunn,    It was a pleasure seeing your patient, Gabriella Clifford, on 4/18/2017. Diagnoses of Essential hypertension, Graves disease, ESR raised, CRP elevated, Polyarthralgia, and Encounter for long-term (current) use of high-risk medication were pertinent to this visit.     Please find attached progress note which includes the history I obtained from Ms. Clifford, my physical examination findings, my impression and recommendations.      Once again, it was a pleasure participating in your patient's care.  Please feel free to contact me if you have any questions or if I can be of any further assistance to your patients.      Sincerely,    Maylin Willingham M.D.  Electronically Signed          PROGRESS NOTE:  Chief Complaint- polyarthralgia    Chief Complaint   Patient presents with   • New Patient     new patient      Gabriella Clifford is a 51 y.o. female here as a new Rheumatology Consult referred by Sarah Dunn M.D. for consultation regarding arthralgias and elevated CRP    HPI:   Ms. Gabriella Clifford is a 51 y.o. female who presents with a history of subclinical hyperthyroidism, hypertension as well as bilateral hand arthralgias and also autoimmune urticaria. She was previously placed on Plaquenil to manage her autoimmune urticaria (described as lesions of hives that would last 1-2 days that is non scarring)but that did not help. To manage her urticaria she was on prednisone for a prolonged period.    She notes that she has been waking up with worsening hand pain bilateral as well as knee pain.  SHe has managed her pain with 800 mg ibuprofen and mainly takes it at night.  She has had bilateral bursitis requiring and takes Norco to assist with her pain.    Pertinent serologies  Rheumatoid factor is negative  Anti-CCP is negative    Evaluation showed  an elevated CRP of 1.76 and a elevated ESR of 20    Hand x-ray from March 16, 2017 showed no evidence of any marginal erosions or periosteal reaction.    Past medical history: Graves, autoimmune urticaria, asthma, high blood pressure diagnosed in 1995.  No miscarriages.  She did have HTN/toxemia during her second pregnancy.  She denies any blood clot history.  Adult onset asthma. She had jaw surgery in middle school. In the 1990s she had tubal ligation. 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      Family history: Rheumatoid arthritis, mom and great aunt had arthritis. First cousin had thyroid Hashimoto's. Father had brain aneurysm.    Social history: Former smoker (social smoker once a week if any), occasional alcohol use. No IV drug use.    She admits to morning stiffness lasting 1-2 hours with swollen joints and stiff hands. She denies any unusual hair loss. She denies photosensitivity to light. She does admit to a history of Raynaud's (this is triggered with cold weather). She also admits to night sweats (she is going to menopause).  She also notices this in her toes and achilles tendon.  Noted as autoimmune urticaria.  She admits to fatigue and headaches and dizziness that is seasonal related to allergies. She admits to muscle spasms as well as a little loss of hearing. She also notes some dizziness and fluid in the ear.  She was referred to ENT and was told she needs to get an MRI. She has a history of hives (this started at age 21). She admits to sinus trouble and drainage in the back of her throat. She admits to bleeding gums. Sore throat sometimes. She admits to dysphagia. She was to shortness of breath. She has urinary frequency worse at night. She denies any nausea vomiting or ulcers. She denies any blood in stools or food intolerance.She reports dry eyes.  She on occoassion has had a blister and burn- skin sensitivity.     She reports red  eyes and no hair loss.    She denies any heart murmur but does have night sweats, cough as well as wheezing.      Current medicines (including changes today)  Current Outpatient Prescriptions   Medication Sig Dispense Refill   • ibuprofen (MOTRIN) 800 MG Tab Take 1 Tab by mouth every 8 hours as needed for Mild Pain. 90 Tab 3   • losartan (COZAAR) 50 MG Tab Take 1 Tab by mouth every day. 90 Tab 3   • hydrocodone-acetaminophen (NORCO) 5-325 MG Tab per tablet Take 1 Tab by mouth every 8 hours as needed. 60 Tab 0   • NIFEdipine (ADALAT CC) 60 MG CR tablet Take 1 Tab by mouth every day. 90 Tab 3   • DYMISTA 137-50 MCG/ACT Suspension   6   • Albuterol Sulfate (PROAIR HFA INH) Inhale  by mouth.     • Azelastine-Fluticasone (DYMISTA) 137-50 MCG/ACT Suspension Spray 1 Spray in nose 2 Times a Day. 1 Bottle 0   • meclizine (ANTIVERT) 25 MG Tab Take 1 Tab by mouth 3 times a day as needed for Dizziness. 30 Tab 0   • omeprazole (PRILOSEC) 20 MG delayed-release capsule Take 1 Cap by mouth every day. 90 Cap 3   • oxybutynin (DITROPAN) 5 MG Tab Take 1 Tab by mouth 3 times a day. 270 Tab 1   • sennosides-docusate sodium (SENOKOT-S) 8.6-50 MG tablet Take 1 Tab by mouth 2 times a day. 20 Tab 0   • guaifenesin-codeine (ROBITUSSIN AC) Solution Take 5 mL by mouth every 6 hours as needed for Cough. 120 mL 0   • beclomethasone (QVAR) 80 MCG/ACT inhaler Inhale 1 Puff by mouth 2 times a day.         No current facility-administered medications for this visit.     She  has a past medical history of Urticaria, chronic (1/3/2013); Hypertension; Bursitis of shoulder, left; and Rotator cuff tear, left. She also has no past medical history of Breast cancer (CMS-[facility]).  She  has past surgical history that includes rotator cuff repair; carpal tunnel release; tubal coagulation laparoscopic bilateral; shoulder surgery; cubital tunnel release; carpal tunnel release; and other.  Family History   Problem Relation Age of Onset   • Arthritis Mother      RA    "  • Cancer Mother      breast   • Hypertension Father    • Cancer Maternal Grandmother      Family Status   Relation Status Death Age   • Mother  77   • Father  86   • Maternal Grandmother     • Maternal Grandfather     • Child Alive      Social History   Substance Use Topics   • Smoking status: Former Smoker   • Smokeless tobacco: Never Used   • Alcohol Use: 0.0 oz/week     0 Standard drinks or equivalent per week      Comment: Occassionally     Social History     Social History Narrative         ROS  Constitutional ROS: Positive for fatigue , sweats   Eye ROS: No eye pain, redness, discharge  Ear ROS: No recent change in hearing  Mouth/Throat ROS: No recent change in voice or hoarseness  Neck ROS: No lumps or masses  Pulmonary ROS: Positive for cough , wheezing on expiration managed with inhalers  Cardiovascular ROS: No chest pain, Positive for dyspnea on exertion   Gastrointestinal ROS: No abdominal pain  Musculoskeletal/Extremities ROS: Positive for arthritis   Hematologic/Lymphatic ROS: No chills  Skin/Integumentary ROS: color, texture and temperature normal.  She has a history of hives.  Neurologic ROS: Normal development, she does report chronic numbness on the 5th digit bilateral  Psychiatry ROS: no depression  Endocrine ROS: grave's disease     Objective:     Blood pressure 132/90, pulse 76, temperature 37.2 °C (98.9 °F), resp. rate 16, height 1.689 m (5' 6.5\"), weight 141.069 kg (311 lb), last menstrual period 2016, SpO2 93 %, not currently breastfeeding. Body mass index is 49.45 kg/(m^2).  Physical Exam:    Filed Vitals:    17 1319   BP: 132/90   Pulse: 76   Temp: 37.2 °C (98.9 °F)   Resp: 16   Height: 1.689 m (5' 6.5\")   Weight: 141.069 kg (311 lb)   SpO2: 93%    Body mass index is 49.45 kg/(m^2).    General/Constitutional: NAD not diaphoretic, comfortable  Eyes: clear conjunctiva, no scleral icterus, EOMI, PERRL  Ears, Nose, Mouth,Throat: no oral ulcers, good " dentition, moist mucous membranes, no discharge from ears bilaterally  Cardiovascular: regular rate and rhythm.  No murmurs, gallops, rubs  Respiratory: normal effort, unlabored respiration.  On auscultation no wheezes, rales, rhonchi.  Clear to auscultation.  GI: soft, NTTP no hepatosplenomegaly, nondistended  Musculoskeletal  Axial:  Cervical: good ROM in rotation  Thoracic: no kyphosis  Upper Extremities:  No synovitis of the PIP, DIP, MCP however there is tenderness on palpation of the MCPs particularly the second digit as well as tenderness in palpation of the PIP. I do not appreciate any dactylitis.  Wrists and Elbows have good ROM  Muscle Strength: 5/5 in deltoids, biceps, triceps, finger , wrists extension bilateral  Lower Extremities:  No knee effusion bilateral, No crepitus bilateral  She does have on squeeze test MTP tenderness R>>L  Muscle Strength: 5/5 in dorsiflexion, plantarflexion, knee extension, knee flexion, and hip flexion bilateral  Gait is normal  Skin: Limited skin exam.  no rashes, no digital ulcerations, no alopecia, no tophi, no skin thickening, no nodules  Neuro: CN II-XII grossly intact, Alert, Oriented x 3, moves all four extremities  Psych: normal affect, normal mood, judgement appropriate, follows commands, responses are appropritae  Heme/Lymph: no cervical adenopathy      No results found for: QNTTBGOLD  No results found for: HEPBCORTOT, HEPBCORIGM, HEPBSAG  No results found for: HEPCAB  Lab Results   Component Value Date/Time    SODIUM 139 01/19/2017 10:27 AM    POTASSIUM 4.0 01/19/2017 10:27 AM    CHLORIDE 107 01/19/2017 10:27 AM    CO2 24 01/19/2017 10:27 AM    GLUCOSE 94 01/19/2017 10:27 AM    BUN 18 01/19/2017 10:27 AM    CREATININE 0.79 01/19/2017 10:27 AM      Lab Results   Component Value Date/Time    WBC 11.3* 09/15/2015 02:15 PM    RBC 4.65 09/15/2015 02:15 PM    HEMOGLOBIN 14.0 09/15/2015 02:15 PM    HEMATOCRIT 41.6 09/15/2015 02:15 PM    MCV 89.5 09/15/2015 02:15 PM       MCH 30.1 09/15/2015 02:15 PM    MCHC 33.7 09/15/2015 02:15 PM    MPV 9.9 09/15/2015 02:15 PM    NEUTROPHILS-POLYS 59.60 09/15/2015 02:15 PM    LYMPHOCYTES 32.40 09/15/2015 02:15 PM    MONOCYTES 5.10 09/15/2015 02:15 PM    EOSINOPHILS 1.80 09/15/2015 02:15 PM    BASOPHILS 0.70 09/15/2015 02:15 PM    HYPOCHROMIA 1+ 07/08/2014 09:03 AM      Lab Results   Component Value Date/Time    CALCIUM 8.7 01/19/2017 10:27 AM    AST(SGOT) 13 01/19/2017 10:27 AM    ALT(SGPT) 17 01/19/2017 10:27 AM    ALKALINE PHOSPHATASE 52 01/19/2017 10:27 AM    TOTAL BILIRUBIN 0.4 01/19/2017 10:27 AM    ALBUMIN 3.9 01/19/2017 10:27 AM    TOTAL PROTEIN 7.3 01/19/2017 10:27 AM     Lab Results   Component Value Date/Time    RHEUMATOID FACTOR -NEPH- <10 03/14/2017 08:14 AM    CCP ANTIBODIES 3 03/14/2017 08:14 AM     Lab Results   Component Value Date/Time    SED RATE WESTERGREN 20 03/14/2017 08:14 AM    STAT C-REACTIVE PROTEIN 1.76* 03/14/2017 08:14 AM     No results found for: RUSSELVIPER, DRVVTINTERP  No results found for: X6YHHPAEGBA, N7MJBFCLONK, IGGCARDIOLI, IGMCARDIOLI, IGACARDIOLI, CRYOGLOBULIN  No results found for: ANADIRECT, ANTIDNADS, RNPAB, SMITHAB, BQOOHVX67, SSAROAB, SSBLAAB, IBJEGZ8UO, CENTROMBAB  No results found for: ANTIDNADS, DSDNA, AGBMAB, GBMABA, ANCAIGG, J6NCIUFCWDT, JO1AB, RNPAB, ANTISSASJ, ANTISSBSJ  No results found for: COLORURINE, SPECGRAVITY, PHURINE, GLUCOSEUR, KETONES, PROTEINURIN  No results found for: TOTPROTUR, TOTALVOLUME, [id number]  No results found for: SSA60, SSA52  Lab Results   Component Value Date/Time    GLYCOHEMOGLOBIN 5.3 01/18/2013 10:59 AM     No results found for: CPKTOTAL  No results found for: G6PD  No results found for: FPTS61DNPW  No results found for: ACESERUM  Lab Results   Component Value Date/Time    25-HYDROXY   VITAMIN D 25 30 05/19/2015 11:33 AM     No results found for: TSH, FREEDIR  Lab Results   Component Value Date/Time    TSH 0.710 01/19/2017 10:27 AM    FREE T-4 0.78 01/19/2017 10:27  AM     Lab Results   Component Value Date/Time    MICROSOMAL -TPO- ABS 1.2 01/15/2014 02:32 PM    ANTI-THYROGLOBULIN <0.9 01/03/2014 10:40 AM     No results found for: IGGLYMEABS  No results found for: ANTIMITOCHO, FACTIN  No results found for: IGA, TTRANSIGA, ENDOIGA  No results found for: FLTYPE, CRYSTALSBDF  No results found for: ISTATICAL  No results found for: ISTATCREAT  No results found for: CTELOPEP  No results found for: GBMABG  No results found for: PTHINTACT  Results for orders placed during the hospital encounter of 03/16/17   DX-JOINT SURVEY-HANDS SINGLE VIEW    Impression No acute fracture or bone erosions identified.                     Assessment and Plan:  Ms. Gabriella Clifford presents with a history of urticaria autoimmune as well as Graves' disease presenting with onset of polyarthralgias affecting the small joints in the hands and feet that will wake her up at night and prolonged morning stiffness lasting 1-2 hours. Her rheumatoid factor and anti-CCP are negative. Hand x-ray was unremarkable for soft tissue swelling or periarticular osteopenia. However on history and exam there is involvement of the MCPs suggestive of a picture for rheumatoid arthritis. Her history also suggestive of an inflammatory arthropathy.    On the differential I suspect that our patient may have a seronegative rheumatoid arthritis and possibly may fit picture overlap syndrome or mixed connective tissue disease given her other diseases including autoimmune urticaria as well as Graves' disease. I will check  AFRICA as well as the SANJUANA panel and anti-double-stranded DNA.  We will also evaluate for complements as well as CH50 level. We'll also recheck her inflammatory markers.    She does have a history of adult onset asthma until keeping in the differential will also consider an ANCA vasculitis and we will check for the serologies. We will also check a UA for any protein or red blood cells.    If she has AFRICA positive and a think  it is reasonable to consider starting Plaquenil. For that I will check a G6PD. Will discuss further with the patient pending the results of her blood work    We will also reach out for records regarding her autoimmune urticaria.      1. Essential hypertension  PARVOVIRUS B19 HUMAN IGG/IGM SERUM    AFRICA TITER    ANTI-SANJUANA PANEL    ANTI-DNA (DS)    ANTI SCL-70 ABS    CENTROMERE AB, IGG    CBC WITH DIFFERENTIAL    COMP METABOLIC PANEL    WESTERGREN SED RATE    CRP QUANTITIVE (NON-CARDIAC)    ANTI-NEUTROPHIL CYTOPLASMIC AB    ANTIMYELOPEROXIDASE (MPO) ABS    ANTI-PROTEINASE 3 (MD-3) ABS    COMPLEMENT C3    COMPLEMENT C4    COMPLEMENT TOTAL (CH50)    COMPLEMENT C1Q, QUANTITATIVE    URINALYSIS    DX-JOINT SURVEY-FEET SINGLE VIEW    URIC ACID   2. Graves disease  PARVOVIRUS B19 HUMAN IGG/IGM SERUM    AFRICA TITER    ANTI-SANJUANA PANEL    ANTI-DNA (DS)    ANTI SCL-70 ABS    CENTROMERE AB, IGG    CBC WITH DIFFERENTIAL    COMP METABOLIC PANEL    WESTERGREN SED RATE    CRP QUANTITIVE (NON-CARDIAC)    ANTI-NEUTROPHIL CYTOPLASMIC AB    ANTIMYELOPEROXIDASE (MPO) ABS    ANTI-PROTEINASE 3 (MD-3) ABS    COMPLEMENT C3    COMPLEMENT C4    COMPLEMENT TOTAL (CH50)    COMPLEMENT C1Q, QUANTITATIVE    URINALYSIS    DX-JOINT SURVEY-FEET SINGLE VIEW    URIC ACID   3. ESR raised     4. CRP elevated  PARVOVIRUS B19 HUMAN IGG/IGM SERUM    AFRICA TITER    ANTI-SANJUANA PANEL    ANTI-DNA (DS)    ANTI SCL-70 ABS    CENTROMERE AB, IGG    CBC WITH DIFFERENTIAL    COMP METABOLIC PANEL    WESTERGREN SED RATE    CRP QUANTITIVE (NON-CARDIAC)    ANTI-NEUTROPHIL CYTOPLASMIC AB    ANTIMYELOPEROXIDASE (MPO) ABS    ANTI-PROTEINASE 3 (MD-3) ABS    COMPLEMENT C3    COMPLEMENT C4    COMPLEMENT TOTAL (CH50)    COMPLEMENT C1Q, QUANTITATIVE    URINALYSIS   5. Polyarthralgia  PARVOVIRUS B19 HUMAN IGG/IGM SERUM    AFRICA TITER    ANTI-SANJUANA PANEL    ANTI-DNA (DS)    ANTI SCL-70 ABS    CENTROMERE AB, IGG    CBC WITH DIFFERENTIAL    COMP METABOLIC PANEL    WESTERGREN SED RATE    CRP  QUANTITIVE (NON-CARDIAC)    ANTI-NEUTROPHIL CYTOPLASMIC AB    ANTIMYELOPEROXIDASE (MPO) ABS    ANTI-PROTEINASE 3 (MS-3) ABS    COMPLEMENT C3    COMPLEMENT C4    COMPLEMENT TOTAL (CH50)    COMPLEMENT C1Q, QUANTITATIVE    URINALYSIS    DX-JOINT SURVEY-FEET SINGLE VIEW    URIC ACID   6. Encounter for long-term (current) use of high-risk medication  G6PD QUANT + RBC       Records requested.  Followup: Return in about 3 weeks (around 5/9/2017). or sooner prn.  The differential diagnosis and workup as well as potential therapy was discussed in detail.  Thank you for this referral.

## 2017-04-18 NOTE — PROGRESS NOTES
Chief Complaint- polyarthralgia    Chief Complaint   Patient presents with   • New Patient     new patient      Gabriella Clifford is a 51 y.o. female here as a new Rheumatology Consult referred by Sarah Dunn M.D. for consultation regarding arthralgias and elevated CRP    HPI:   Ms. Gabriella Clifford is a 51 y.o. female who presents with a history of subclinical hyperthyroidism, hypertension as well as bilateral hand arthralgias and also autoimmune urticaria. She was previously placed on Plaquenil to manage her autoimmune urticaria (described as lesions of hives that would last 1-2 days that is non scarring)but that did not help. To manage her urticaria she was on prednisone for a prolonged period.    She notes that she has been waking up with worsening hand pain bilateral as well as knee pain.  SHe has managed her pain with 800 mg ibuprofen and mainly takes it at night.  She has had bilateral bursitis requiring and takes Norco to assist with her pain.    Pertinent serologies  Rheumatoid factor is negative  Anti-CCP is negative    Evaluation showed an elevated CRP of 1.76 and a elevated ESR of 20    Hand x-ray from March 16, 2017 showed no evidence of any marginal erosions or periosteal reaction.    Past medical history: Graves, autoimmune urticaria, asthma, high blood pressure diagnosed in 1995.  No miscarriages.  She did have HTN/toxemia during her second pregnancy.  She denies any blood clot history.  Adult onset asthma. She had jaw surgery in middle school. In the 1990s she had tubal ligation. 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      Family history: Rheumatoid arthritis, mom and great aunt had arthritis. First cousin had thyroid Hashimoto's. Father had brain aneurysm.    Social history: Former smoker (social smoker once a week if any), occasional alcohol use. No IV drug use.    She admits to morning stiffness lasting 1-2 hours with swollen  joints and stiff hands. She denies any unusual hair loss. She denies photosensitivity to light. She does admit to a history of Raynaud's (this is triggered with cold weather). She also admits to night sweats (she is going to menopause).  She also notices this in her toes and achilles tendon.  Noted as autoimmune urticaria.  She admits to fatigue and headaches and dizziness that is seasonal related to allergies. She admits to muscle spasms as well as a little loss of hearing. She also notes some dizziness and fluid in the ear.  She was referred to ENT and was told she needs to get an MRI. She has a history of hives (this started at age 21). She admits to sinus trouble and drainage in the back of her throat. She admits to bleeding gums. Sore throat sometimes. She admits to dysphagia. She was to shortness of breath. She has urinary frequency worse at night. She denies any nausea vomiting or ulcers. She denies any blood in stools or food intolerance.She reports dry eyes.  She on occoassion has had a blister and burn- skin sensitivity.     She reports red eyes and no hair loss.    She denies any heart murmur but does have night sweats, cough as well as wheezing.      Current medicines (including changes today)  Current Outpatient Prescriptions   Medication Sig Dispense Refill   • ibuprofen (MOTRIN) 800 MG Tab Take 1 Tab by mouth every 8 hours as needed for Mild Pain. 90 Tab 3   • losartan (COZAAR) 50 MG Tab Take 1 Tab by mouth every day. 90 Tab 3   • hydrocodone-acetaminophen (NORCO) 5-325 MG Tab per tablet Take 1 Tab by mouth every 8 hours as needed. 60 Tab 0   • NIFEdipine (ADALAT CC) 60 MG CR tablet Take 1 Tab by mouth every day. 90 Tab 3   • DYMISTA 137-50 MCG/ACT Suspension   6   • Albuterol Sulfate (PROAIR HFA INH) Inhale  by mouth.     • Azelastine-Fluticasone (DYMISTA) 137-50 MCG/ACT Suspension Spray 1 Spray in nose 2 Times a Day. 1 Bottle 0   • meclizine (ANTIVERT) 25 MG Tab Take 1 Tab by mouth 3 times a day as  needed for Dizziness. 30 Tab 0   • omeprazole (PRILOSEC) 20 MG delayed-release capsule Take 1 Cap by mouth every day. 90 Cap 3   • oxybutynin (DITROPAN) 5 MG Tab Take 1 Tab by mouth 3 times a day. 270 Tab 1   • sennosides-docusate sodium (SENOKOT-S) 8.6-50 MG tablet Take 1 Tab by mouth 2 times a day. 20 Tab 0   • guaifenesin-codeine (ROBITUSSIN AC) Solution Take 5 mL by mouth every 6 hours as needed for Cough. 120 mL 0   • beclomethasone (QVAR) 80 MCG/ACT inhaler Inhale 1 Puff by mouth 2 times a day.         No current facility-administered medications for this visit.     She  has a past medical history of Urticaria, chronic (1/3/2013); Hypertension; Bursitis of shoulder, left; and Rotator cuff tear, left. She also has no past medical history of Breast cancer (CMS-[facility]).  She  has past surgical history that includes rotator cuff repair; carpal tunnel release; tubal coagulation laparoscopic bilateral; shoulder surgery; cubital tunnel release; carpal tunnel release; and other.  Family History   Problem Relation Age of Onset   • Arthritis Mother      RA   • Cancer Mother      breast   • Hypertension Father    • Cancer Maternal Grandmother      Family Status   Relation Status Death Age   • Mother  77   • Father  86   • Maternal Grandmother     • Maternal Grandfather     • Child Alive      Social History   Substance Use Topics   • Smoking status: Former Smoker   • Smokeless tobacco: Never Used   • Alcohol Use: 0.0 oz/week     0 Standard drinks or equivalent per week      Comment: Occassionally     Social History     Social History Narrative         ROS  Constitutional ROS: Positive for fatigue , sweats   Eye ROS: No eye pain, redness, discharge  Ear ROS: No recent change in hearing  Mouth/Throat ROS: No recent change in voice or hoarseness  Neck ROS: No lumps or masses  Pulmonary ROS: Positive for cough , wheezing on expiration managed with inhalers  Cardiovascular ROS: No chest pain,  "Positive for dyspnea on exertion   Gastrointestinal ROS: No abdominal pain  Musculoskeletal/Extremities ROS: Positive for arthritis   Hematologic/Lymphatic ROS: No chills  Skin/Integumentary ROS: color, texture and temperature normal.  She has a history of hives.  Neurologic ROS: Normal development, she does report chronic numbness on the 5th digit bilateral  Psychiatry ROS: no depression  Endocrine ROS: grave's disease     Objective:     Blood pressure 132/90, pulse 76, temperature 37.2 °C (98.9 °F), resp. rate 16, height 1.689 m (5' 6.5\"), weight 141.069 kg (311 lb), last menstrual period 09/01/2016, SpO2 93 %, not currently breastfeeding. Body mass index is 49.45 kg/(m^2).  Physical Exam:    Filed Vitals:    04/18/17 1319   BP: 132/90   Pulse: 76   Temp: 37.2 °C (98.9 °F)   Resp: 16   Height: 1.689 m (5' 6.5\")   Weight: 141.069 kg (311 lb)   SpO2: 93%    Body mass index is 49.45 kg/(m^2).    General/Constitutional: NAD not diaphoretic, comfortable  Eyes: clear conjunctiva, no scleral icterus, EOMI, PERRL  Ears, Nose, Mouth,Throat: no oral ulcers, good dentition, moist mucous membranes, no discharge from ears bilaterally  Cardiovascular: regular rate and rhythm.  No murmurs, gallops, rubs  Respiratory: normal effort, unlabored respiration.  On auscultation no wheezes, rales, rhonchi.  Clear to auscultation.  GI: soft, NTTP no hepatosplenomegaly, nondistended  Musculoskeletal  Axial:  Cervical: good ROM in rotation  Thoracic: no kyphosis  Upper Extremities:  No synovitis of the PIP, DIP, MCP however there is tenderness on palpation of the MCPs particularly the second digit as well as tenderness in palpation of the PIP. I do not appreciate any dactylitis.  Wrists and Elbows have good ROM  Muscle Strength: 5/5 in deltoids, biceps, triceps, finger , wrists extension bilateral  Lower Extremities:  No knee effusion bilateral, No crepitus bilateral  She does have on squeeze test MTP tenderness R>>L  Muscle Strength: " 5/5 in dorsiflexion, plantarflexion, knee extension, knee flexion, and hip flexion bilateral  Gait is normal  Skin: Limited skin exam.  no rashes, no digital ulcerations, no alopecia, no tophi, no skin thickening, no nodules  Neuro: CN II-XII grossly intact, Alert, Oriented x 3, moves all four extremities  Psych: normal affect, normal mood, judgement appropriate, follows commands, responses are appropritae  Heme/Lymph: no cervical adenopathy      No results found for: QNTTBGOLD  No results found for: HEPBCORTOT, HEPBCORIGM, HEPBSAG  No results found for: HEPCAB  Lab Results   Component Value Date/Time    SODIUM 139 01/19/2017 10:27 AM    POTASSIUM 4.0 01/19/2017 10:27 AM    CHLORIDE 107 01/19/2017 10:27 AM    CO2 24 01/19/2017 10:27 AM    GLUCOSE 94 01/19/2017 10:27 AM    BUN 18 01/19/2017 10:27 AM    CREATININE 0.79 01/19/2017 10:27 AM      Lab Results   Component Value Date/Time    WBC 11.3* 09/15/2015 02:15 PM    RBC 4.65 09/15/2015 02:15 PM    HEMOGLOBIN 14.0 09/15/2015 02:15 PM    HEMATOCRIT 41.6 09/15/2015 02:15 PM    MCV 89.5 09/15/2015 02:15 PM    MCH 30.1 09/15/2015 02:15 PM    MCHC 33.7 09/15/2015 02:15 PM    MPV 9.9 09/15/2015 02:15 PM    NEUTROPHILS-POLYS 59.60 09/15/2015 02:15 PM    LYMPHOCYTES 32.40 09/15/2015 02:15 PM    MONOCYTES 5.10 09/15/2015 02:15 PM    EOSINOPHILS 1.80 09/15/2015 02:15 PM    BASOPHILS 0.70 09/15/2015 02:15 PM    HYPOCHROMIA 1+ 07/08/2014 09:03 AM      Lab Results   Component Value Date/Time    CALCIUM 8.7 01/19/2017 10:27 AM    AST(SGOT) 13 01/19/2017 10:27 AM    ALT(SGPT) 17 01/19/2017 10:27 AM    ALKALINE PHOSPHATASE 52 01/19/2017 10:27 AM    TOTAL BILIRUBIN 0.4 01/19/2017 10:27 AM    ALBUMIN 3.9 01/19/2017 10:27 AM    TOTAL PROTEIN 7.3 01/19/2017 10:27 AM     Lab Results   Component Value Date/Time    RHEUMATOID FACTOR -NEPH- <10 03/14/2017 08:14 AM    CCP ANTIBODIES 3 03/14/2017 08:14 AM     Lab Results   Component Value Date/Time    SED RATE WESTERGREN 20 03/14/2017 08:14  AM    STAT C-REACTIVE PROTEIN 1.76* 03/14/2017 08:14 AM     No results found for: RUSSELVIPER, DRVVTINTERP  No results found for: F6BTDZCLPOA, L9EFULWJDQV, IGGCARDIOLI, IGMCARDIOLI, IGACARDIOLI, CRYOGLOBULIN  No results found for: ANADIRECT, ANTIDNADS, RNPAB, SMITHAB, UTJZELR20, SSAROAB, SSBLAAB, UZYSDK0WP, CENTROMBAB  No results found for: ANTIDNADS, DSDNA, AGBMAB, GBMABA, ANCAIGG, Z3CBDVMDXSR, JO1AB, RNPAB, ANTISSASJ, ANTISSBSJ  No results found for: COLORURINE, SPECGRAVITY, PHURINE, GLUCOSEUR, KETONES, PROTEINURIN  No results found for: TOTPROTUR, TOTALVOLUME, VCOECPCJ96  No results found for: SSA60, SSA52  Lab Results   Component Value Date/Time    GLYCOHEMOGLOBIN 5.3 01/18/2013 10:59 AM     No results found for: CPKTOTAL  No results found for: G6PD  No results found for: [id number]  No results found for: ACESERUM  Lab Results   Component Value Date/Time    25-HYDROXY   VITAMIN D 25 30 05/19/2015 11:33 AM     No results found for: TSH, FREEDIR  Lab Results   Component Value Date/Time    TSH 0.710 01/19/2017 10:27 AM    FREE T-4 0.78 01/19/2017 10:27 AM     Lab Results   Component Value Date/Time    MICROSOMAL -TPO- ABS 1.2 01/15/2014 02:32 PM    ANTI-THYROGLOBULIN <0.9 01/03/2014 10:40 AM     No results found for: IGGLYMEABS  No results found for: ANTIMITOCHO, FACTIN  No results found for: IGA, TTRANSIGA, ENDOIGA  No results found for: FLTYPE, CRYSTALSBDF  No results found for: ISTATICAL  No results found for: ISTATCREAT  No results found for: CTELOPEP  No results found for: GBMABG  No results found for: PTHINTACT  Results for orders placed during the hospital encounter of 03/16/17   DX-JOINT SURVEY-HANDS SINGLE VIEW    Impression No acute fracture or bone erosions identified.                     Assessment and Plan:  Ms. Gabriella Clifford presents with a history of urticaria autoimmune as well as Graves' disease presenting with onset of polyarthralgias affecting the small joints in the hands and feet that will  wake her up at night and prolonged morning stiffness lasting 1-2 hours. Her rheumatoid factor and anti-CCP are negative. Hand x-ray was unremarkable for soft tissue swelling or periarticular osteopenia. However on history and exam there is involvement of the MCPs suggestive of a picture for rheumatoid arthritis. Her history also suggestive of an inflammatory arthropathy.    On the differential I suspect that our patient may have a seronegative rheumatoid arthritis and possibly may fit picture overlap syndrome or mixed connective tissue disease given her other diseases including autoimmune urticaria as well as Graves' disease. I will check  AFRICA as well as the SANJUANA panel and anti-double-stranded DNA.  We will also evaluate for complements as well as CH50 level. We'll also recheck her inflammatory markers.    She does have a history of adult onset asthma until keeping in the differential will also consider an ANCA vasculitis and we will check for the serologies. We will also check a UA for any protein or red blood cells.    If she has AFRICA positive and a think it is reasonable to consider starting Plaquenil. For that I will check a G6PD. Will discuss further with the patient pending the results of her blood work    We will also reach out for records regarding her autoimmune urticaria.      1. Essential hypertension  PARVOVIRUS B19 HUMAN IGG/IGM SERUM    AFRICA TITER    ANTI-SANJUANA PANEL    ANTI-DNA (DS)    ANTI SCL-70 ABS    CENTROMERE AB, IGG    CBC WITH DIFFERENTIAL    COMP METABOLIC PANEL    WESTERGREN SED RATE    CRP QUANTITIVE (NON-CARDIAC)    ANTI-NEUTROPHIL CYTOPLASMIC AB    ANTIMYELOPEROXIDASE (MPO) ABS    ANTI-PROTEINASE 3 (LA-3) ABS    COMPLEMENT C3    COMPLEMENT C4    COMPLEMENT TOTAL (CH50)    COMPLEMENT C1Q, QUANTITATIVE    URINALYSIS    DX-JOINT SURVEY-FEET SINGLE VIEW    URIC ACID   2. Graves disease  PARVOVIRUS B19 HUMAN IGG/IGM SERUM    AFRICA TITER    ANTI-SANJUANA PANEL    ANTI-DNA (DS)    ANTI SCL-70 ABS     CENTROMERE AB, IGG    CBC WITH DIFFERENTIAL    COMP METABOLIC PANEL    WESTERGREN SED RATE    CRP QUANTITIVE (NON-CARDIAC)    ANTI-NEUTROPHIL CYTOPLASMIC AB    ANTIMYELOPEROXIDASE (MPO) ABS    ANTI-PROTEINASE 3 (KS-3) ABS    COMPLEMENT C3    COMPLEMENT C4    COMPLEMENT TOTAL (CH50)    COMPLEMENT C1Q, QUANTITATIVE    URINALYSIS    DX-JOINT SURVEY-FEET SINGLE VIEW    URIC ACID   3. ESR raised     4. CRP elevated  PARVOVIRUS B19 HUMAN IGG/IGM SERUM    AFRICA TITER    ANTI-SANJUANA PANEL    ANTI-DNA (DS)    ANTI SCL-70 ABS    CENTROMERE AB, IGG    CBC WITH DIFFERENTIAL    COMP METABOLIC PANEL    WESTERGREN SED RATE    CRP QUANTITIVE (NON-CARDIAC)    ANTI-NEUTROPHIL CYTOPLASMIC AB    ANTIMYELOPEROXIDASE (MPO) ABS    ANTI-PROTEINASE 3 (KS-3) ABS    COMPLEMENT C3    COMPLEMENT C4    COMPLEMENT TOTAL (CH50)    COMPLEMENT C1Q, QUANTITATIVE    URINALYSIS   5. Polyarthralgia  PARVOVIRUS B19 HUMAN IGG/IGM SERUM    AFRICA TITER    ANTI-SANJUANA PANEL    ANTI-DNA (DS)    ANTI SCL-70 ABS    CENTROMERE AB, IGG    CBC WITH DIFFERENTIAL    COMP METABOLIC PANEL    WESTERGREN SED RATE    CRP QUANTITIVE (NON-CARDIAC)    ANTI-NEUTROPHIL CYTOPLASMIC AB    ANTIMYELOPEROXIDASE (MPO) ABS    ANTI-PROTEINASE 3 (KS-3) ABS    COMPLEMENT C3    COMPLEMENT C4    COMPLEMENT TOTAL (CH50)    COMPLEMENT C1Q, QUANTITATIVE    URINALYSIS    DX-JOINT SURVEY-FEET SINGLE VIEW    URIC ACID   6. Encounter for long-term (current) use of high-risk medication  G6PD QUANT + RBC       Records requested.  Followup: Return in about 3 weeks (around 5/9/2017). or sooner prn.  The differential diagnosis and workup as well as potential therapy was discussed in detail.  Thank you for this referral.

## 2017-04-19 LAB
BASOPHILS # BLD AUTO: 0.6 % (ref 0–1.8)
BASOPHILS # BLD: 0.09 K/UL (ref 0–0.12)
EOSINOPHIL # BLD AUTO: 0.33 K/UL (ref 0–0.51)
EOSINOPHIL NFR BLD: 2.3 % (ref 0–6.9)
ERYTHROCYTE [DISTWIDTH] IN BLOOD BY AUTOMATED COUNT: 44.3 FL (ref 35.9–50)
ERYTHROCYTE [SEDIMENTATION RATE] IN BLOOD BY WESTERGREN METHOD: 13 MM/HOUR (ref 0–30)
HCT VFR BLD AUTO: 44 % (ref 37–47)
HGB BLD-MCNC: 15 G/DL (ref 12–16)
IMM GRANULOCYTES # BLD AUTO: 0.04 K/UL (ref 0–0.11)
IMM GRANULOCYTES NFR BLD AUTO: 0.3 % (ref 0–0.9)
LYMPHOCYTES # BLD AUTO: 4.09 K/UL (ref 1–4.8)
LYMPHOCYTES NFR BLD: 29 % (ref 22–41)
MCH RBC QN AUTO: 30.7 PG (ref 27–33)
MCHC RBC AUTO-ENTMCNC: 34.1 G/DL (ref 33.6–35)
MCV RBC AUTO: 90 FL (ref 81.4–97.8)
MONOCYTES # BLD AUTO: 0.53 K/UL (ref 0–0.85)
MONOCYTES NFR BLD AUTO: 3.8 % (ref 0–13.4)
NEUTROPHILS # BLD AUTO: 9.03 K/UL (ref 2–7.15)
NEUTROPHILS NFR BLD: 64 % (ref 44–72)
NRBC # BLD AUTO: 0 K/UL
NRBC BLD AUTO-RTO: 0 /100 WBC
PLATELET # BLD AUTO: 379 K/UL (ref 164–446)
PMV BLD AUTO: 9.9 FL (ref 9–12.9)
RBC # BLD AUTO: 4.89 M/UL (ref 4.2–5.4)
WBC # BLD AUTO: 14.1 K/UL (ref 4.8–10.8)

## 2017-04-20 LAB
ANCA IGG TITR SER IF: NORMAL {TITER}
B19V IGG SER IA-ACNC: 0.26 IV
B19V IGM SER IA-ACNC: 0.09 IV
CENTROMERE IGG TITR SER IF: 2 AU/ML (ref 0–40)
ENA SCL70 IGG SER QL: 0 AU/ML (ref 0–40)
ENA SM IGG SER-ACNC: 0 AU/ML (ref 0–40)
ENA SS-B IGG SER IA-ACNC: 1 AU/ML (ref 0–40)
MISCELLANEOUS LAB RESULT MISCLAB: NORMAL
NUCLEAR IGG SER QL IA: NORMAL
SSA52 R0ENA AB IGG Q0420: 6 AU/ML (ref 0–40)
SSA60 R0ENA AB IGG Q0419: 14 AU/ML (ref 0–40)
U1 SNRNP IGG SER QL: 0 AU/ML (ref 0–40)

## 2017-04-21 LAB
CH50 SERPL-ACNC: 146 CAE UNITS (ref 60–144)
DSDNA AB TITR SER CLIF: NORMAL {TITER}
G6PD RBC-CCNC: 12.6 U/G HB (ref 9.9–16.6)

## 2017-04-24 ENCOUNTER — OFFICE VISIT (OUTPATIENT)
Dept: MEDICAL GROUP | Facility: PHYSICIAN GROUP | Age: 52
End: 2017-04-24
Payer: COMMERCIAL

## 2017-04-24 VITALS
DIASTOLIC BLOOD PRESSURE: 78 MMHG | RESPIRATION RATE: 16 BRPM | TEMPERATURE: 97.7 F | SYSTOLIC BLOOD PRESSURE: 126 MMHG | HEART RATE: 66 BPM | BODY MASS INDEX: 47.09 KG/M2 | WEIGHT: 293 LBS | HEIGHT: 66 IN | OXYGEN SATURATION: 97 %

## 2017-04-24 DIAGNOSIS — J45.40 ASTHMA IN ADULT, MODERATE PERSISTENT, UNCOMPLICATED: ICD-10-CM

## 2017-04-24 DIAGNOSIS — E05.00 GRAVES DISEASE: ICD-10-CM

## 2017-04-24 DIAGNOSIS — Z23 NEED FOR TDAP VACCINATION: ICD-10-CM

## 2017-04-24 DIAGNOSIS — I10 ESSENTIAL HYPERTENSION: ICD-10-CM

## 2017-04-24 DIAGNOSIS — E04.2 MULTIPLE THYROID NODULES: ICD-10-CM

## 2017-04-24 DIAGNOSIS — Z00.00 HEALTH CARE MAINTENANCE: ICD-10-CM

## 2017-04-24 DIAGNOSIS — L50.8 URTICARIA, CHRONIC: ICD-10-CM

## 2017-04-24 DIAGNOSIS — E05.90 SUBCLINICAL HYPERTHYROIDISM: ICD-10-CM

## 2017-04-24 PROCEDURE — 90471 IMMUNIZATION ADMIN: CPT | Performed by: NURSE PRACTITIONER

## 2017-04-24 PROCEDURE — 99214 OFFICE O/P EST MOD 30 MIN: CPT | Mod: 25 | Performed by: NURSE PRACTITIONER

## 2017-04-24 PROCEDURE — 90472 IMMUNIZATION ADMIN EACH ADD: CPT | Performed by: NURSE PRACTITIONER

## 2017-04-24 PROCEDURE — 90715 TDAP VACCINE 7 YRS/> IM: CPT | Performed by: NURSE PRACTITIONER

## 2017-04-24 NOTE — ASSESSMENT & PLAN NOTE
This is a chronic condition, she has previously been followed by endocrinology. She reports that her past provider has left the practice and she was given a referral to start seeing a new endocrinologist. She has not made an appointment yet and thinks that she may need a new referral. She continues to have difficulty with losing weight. She was given a prescription for methimazole by her past endocrinologist, and as a result had weight gain and fatigue. A new referral was placed for endocrinology.

## 2017-04-24 NOTE — ASSESSMENT & PLAN NOTE
Patient reports she has chronic urticaria and is followed by allergist. She only sees him on an as-needed basis.

## 2017-04-24 NOTE — PROGRESS NOTES
Chief Complaint   Patient presents with   • Establish Care         This is a 51 y.o.female patient that presents today with the following: Establish care with new PCP, acute and chronic conditions    Health care maintenance  Patient is due for health maintenance exams. 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 She will also schedule her mammogram at the same time. She is up-to-date with labs. She will get TDaP today. It appears according to her health maintenance record she is overdue for Pap smear, but she states she had done last year or the year before. We will obtain her outside records. She is up-to-date with flu shot and pneumonia shot.    Graves disease  This is a chronic condition, she has previously been followed by endocrinology. She reports that her past provider has left the practice and she was given a referral to start seeing a new endocrinologist. She has not made an appointment yet and thinks that she may need a new referral. She continues to have difficulty with losing weight. She was given a prescription for methimazole by her past endocrinologist, and as a result had weight gain and fatigue. A new referral was placed for endocrinology.    Urticaria, chronic  Patient reports she has chronic urticaria and is followed by allergist. She only sees him on an as-needed basis.    Asthma in adult  Patient has long history of asthma, it is stable and well controlled on current regimen including Qvar and as needed albuterol. She does see an allergist who does refill all of her inhaler medications.    Hypertension  This is a chronic condition, stable and well-controlled on losartan and nifedipine. Her blood pressure today is 126/78. She denies symptoms of hypertension. She tolerates her medications well with no significant bothersome side effects. She does not need refills at this time, but can call for refills when needed. We will  see her annually and as needed.        Hospital Outpatient Visit on 04/18/2017   Component Date Value   • Parvovirus Anti-Igg Index 04/18/2017 0.26    • Parvovirus Anti-Igm Index 04/18/2017 0.09    • SSA 52 (R0)(SANJUANA) Ab, IgG 04/18/2017 6    • SSA 60 (R0)(SANJUANA) Ab, IgG 04/18/2017 14    • Sjogren'S Anti-Ss-B 04/18/2017 1    • Norris Antibodies 04/18/2017 0    • Rnp Antibodies 04/18/2017 0    • Anti-Dna -Ds 04/18/2017 None Detected    • Anti-Scl-70 04/18/2017 0    • Anti-Centromere B Ab 04/18/2017 2    • WBC 04/18/2017 14.1*   • RBC 04/18/2017 4.89    • Hemoglobin 04/18/2017 15.0    • Hematocrit 04/18/2017 44.0    • MCV 04/18/2017 90.0    • MCH 04/18/2017 30.7    • MCHC 04/18/2017 34.1    • RDW 04/18/2017 44.3    • Platelet Count 04/18/2017 379    • MPV 04/18/2017 9.9    • Neutrophils-Polys 04/18/2017 64.00    • Lymphocytes 04/18/2017 29.00    • Monocytes 04/18/2017 3.80    • Eosinophils 04/18/2017 2.30    • Basophils 04/18/2017 0.60    • Immature Granulocytes 04/18/2017 0.30    • Nucleated RBC 04/18/2017 0.00    • Neutrophils (Absolute) 04/18/2017 9.03*   • Lymphs (Absolute) 04/18/2017 4.09    • Monos (Absolute) 04/18/2017 0.53    • Eos (Absolute) 04/18/2017 0.33    • Baso (Absolute) 04/18/2017 0.09    • Immature Granulocytes (a* 04/18/2017 0.04    • NRBC (Absolute) 04/18/2017 0.00    • Sodium 04/18/2017 139    • Potassium 04/18/2017 3.5*   • Chloride 04/18/2017 104    • Co2 04/18/2017 24    • Anion Gap 04/18/2017 11.0    • Glucose 04/18/2017 155*   • Bun 04/18/2017 22    • Creatinine 04/18/2017 0.86    • Calcium 04/18/2017 9.5    • AST(SGOT) 04/18/2017 14    • ALT(SGPT) 04/18/2017 20    • Alkaline Phosphatase 04/18/2017 55    • Total Bilirubin 04/18/2017 0.3    • Albumin 04/18/2017 4.1    • Total Protein 04/18/2017 7.4    • Globulin 04/18/2017 3.3    • A-G Ratio 04/18/2017 1.2    • Sed Rate Westergren 04/18/2017 13    • Stat C-Reactive Protein 04/18/2017 1.76*   • ANCA IgG 04/18/2017 <1:20    • C3 Complement 04/18/2017  184.0    • Complement C4 04/18/2017 48.0    • Complement Total Hemolyt* 04/18/2017 146*   • Color 04/18/2017 Lt. Yellow    • Character 04/18/2017 Clear    • Specific Gravity 04/18/2017 1.021    • Ph 04/18/2017 5.5    • Glucose 04/18/2017 Negative    • Ketones 04/18/2017 Negative    • Protein 04/18/2017 Negative    • Bilirubin 04/18/2017 Negative    • Nitrite 04/18/2017 Negative    • Leukocyte Esterase 04/18/2017 Negative    • Occult Blood 04/18/2017 Negative    • Micro Urine Req 04/18/2017 see below    • Uric Acid 04/18/2017 3.1    • G-6-Pd 04/18/2017 12.6    • Antinuclear Antibody 04/18/2017 None Detected    • Misc. Lab Rslt 04/18/2017 SEE NOTE    • GFR If  04/18/2017 >60    • GFR If Non  Ameri* 04/18/2017 >60          clinical course has been stable    Past Medical History   Diagnosis Date   • Urticaria, chronic 1/3/2013   • Hypertension    • Bursitis of shoulder, left    • Rotator cuff tear, left        Past Surgical History   Procedure Laterality Date   • Rotator cuff repair       left   • Carpal tunnel release     • Tubal coagulation laparoscopic bilateral     • Shoulder surgery     • Cubital tunnel release     • Carpal tunnel release     • Other       jaw surgery       Family History   Problem Relation Age of Onset   • Arthritis Mother      RA   • Cancer Mother      breast   • Hypertension Father    • Cancer Maternal Grandmother        Erythromycin; Keflex; and Pcn    Current Outpatient Prescriptions Ordered in [facility]   Medication Sig Dispense Refill   • ibuprofen (MOTRIN) 800 MG Tab Take 1 Tab by mouth every 8 hours as needed for Mild Pain. 90 Tab 3   • losartan (COZAAR) 50 MG Tab Take 1 Tab by mouth every day. 90 Tab 3   • hydrocodone-acetaminophen (NORCO) 5-325 MG Tab per tablet Take 1 Tab by mouth every 8 hours as needed. 60 Tab 0   • NIFEdipine (ADALAT CC) 60 MG CR tablet Take 1 Tab by mouth every day. 90 Tab 3   • Azelastine-Fluticasone (DYMISTA) 137-50 MCG/ACT Suspension Spray 1  "Spray in nose 2 Times a Day. 1 Bottle 0   • Albuterol Sulfate (PROAIR HFA INH) Inhale  by mouth.     • beclomethasone (QVAR) 80 MCG/ACT inhaler Inhale 1 Puff by mouth 2 times a day.       • DYMISTA 137-50 MCG/ACT Suspension   6     No current Epic-ordered facility-administered medications on file.       Constitutional ROS: No unexpected change in weight, No weakness, No unexplained fevers, sweats, or chills  Pulmonary ROS: Positive per history of present illness  Cardiovascular ROS: No chest pain, No edema, No palpitations  Gastrointestinal ROS: No abdominal pain, No nausea, vomiting, diarrhea, or constipation, no blood in stool  Musculoskeletal/Extremities ROS: Positive for chronic joint pain  Neurologic ROS: Normal development, No seizures, No weakness  Endocrine ROS: Positive per history of present illness    Physical exam:  /78 mmHg  Pulse 66  Temp(Src) 36.5 °C (97.7 °F)  Resp 16  Ht 1.689 m (5' 6.5\")  Wt 137.893 kg (304 lb)  BMI 48.34 kg/m2  SpO2 97%  LMP 09/01/2016  General Appearance: Middle-aged female appearing older than stated age, alert, no distress, morbidly obese, well-groomed  Skin: Skin color, texture, turgor normal. No rashes or lesions.  Lungs: negative findings: normal respiratory rate and rhythm, lungs clear to auscultation  Heart: negative. RRR without murmur, gallop, or rubs.  No ectopy.  Abdomen: Abdomen soft, non-tender. BS normal. No masses,  No organomegaly  Musculoskeletal: negative findings: no erythema, induration, or nodules, no evidence of joint effusion, no deformities present  Neurologic: intact, oriented, mood appropriate, judgment intact. Cranial nerves II through XII grossly intact    Medical decision making/discussion: Patient here to establish care with new PCP and discuss her acute and chronic conditions. Her labs are up-to-date. She will have mammogram and colonoscopy over the summer. I have placed referral to endocrinology for consultation of thyroid nodules and " Graves' disease as she has seen endocrinologist in the past. She will get TDaP today. She is to continue care with her other specialists including urologist, rheumatologist and allergist. We will get her outside records regarding her Pap smears and update the health maintenance record.       Gabriella was seen today for establish care.    Diagnoses and all orders for this visit:    Graves disease  -     REFERRAL TO ENDOCRINOLOGY    Subclinical hyperthyroidism  -     REFERRAL TO ENDOCRINOLOGY    Multiple thyroid nodules  -     REFERRAL TO ENDOCRINOLOGY    Health care maintenance    Need for Tdap vaccination  -     TDAP VACCINE =>6YO IM    Asthma in adult, moderate persistent, uncomplicated    Urticaria, chronic    Essential hypertension    Other orders  -     Obtain Results:: Other (see comment); Obtain Results From:: Other (see comment)          Please note that this dictation was created using voice recognition software. I have made every reasonable attempt to correct obvious errors, but I expect that there are errors of grammar and possibly content that I did not discover before finalizing the note.

## 2017-04-24 NOTE — MR AVS SNAPSHOT
"        Gabriella Clifford   2017 1:00 PM   Office Visit   MRN: [id number]    Department:  [facility]   Dept Phone:  [phone]    Description:  Female : 1965   Provider:  VALERIE Lopez           Reason for Visit     Establish Care           Allergies as of 2017     Allergen Noted Reactions    Erythromycin 2013       Keflex 2013       Pcn [Penicillins] 2013         You were diagnosed with     Health care maintenance   [152197]       Subclinical hyperthyroidism   [982219]       Multiple thyroid nodules   [645559]       Graves disease   [445230]       Need for Tdap vaccination   [552882]         Vital Signs     Blood Pressure Pulse Temperature Respirations Height Weight    126/78 mmHg 66 36.5 °C (97.7 °F) 16 1.689 m (5' 6.5\") 137.893 kg (304 lb)    Body Mass Index Oxygen Saturation Last Menstrual Period Smoking Status          48.34 kg/m2 97% 2016 Former Smoker        Basic Information     Date Of Birth Sex Race Ethnicity Preferred Language    1965 Female White Non- English      Your appointments     May 08, 2017  1:30 PM   Follow Up Visit with Maylin Willingham M.D.   [facility] (--)    [address], [address]  [facility] [id number] [phone]           You will be receiving a confirmation call a few days before your appointment from our automated call confirmation system.            May 23, 2017  1:00 PM   Telemedicine Clinic New Patient with Brenda Louis M.D., TELEMED GYN, TELEMEDICINE [place]   Centralized Scheduling (--)    [address]  [facility] [id number] [phone]              Problem List              ICD-10-CM Priority Class Noted - Resolved    Urticaria, chronic L50.8   1/3/2013 - Present    Hypertension I10   1/3/2013 - Present    Asthma in adult J45.909   1/3/2013 - Present    Shoulder pain M25.519   1/3/2013 - Present    Joint pain M25.50   2013 - Present    Multiple thyroid nodules E04.2   2014 - " Present    Subclinical hyperthyroidism E05.90   7/31/2014 - Present    Graves disease E05.00   2/12/2015 - Present    Urinary urgency R39.15   9/22/2015 - Present    Burning pain R52   9/22/2015 - Present    Chronic cough R05   12/22/2015 - Present    Morbid obesity with BMI of 45.0-49.9, adult ([facility]) E66.01, Z68.42   3/14/2017 - Present    Health care maintenance Z00.00   4/24/2017 - Present      Health Maintenance        Date Due Completion Dates    IMM DTaP/Tdap/Td Vaccine (1 - Tdap) 8/9/1985 ---    MAMMOGRAM 4/30/2015 4/30/2014, 1/17/2013, 1/3/2005 (N/S)    Override on 1/3/2005: (N/S)    COLONOSCOPY 8/9/2016 ---    PAP SMEAR 3/2/2017 3/2/2014 (Prv Comp)    Override on 3/2/2014: Previously completed            Current Immunizations     Influenza TIV (IM) 10/30/2013  7:51 AM    Influenza Vaccine Quad Inj (Pf) 10/15/2014    Pneumococcal polysaccharide vaccine (PPSV-23) 3/14/2017    Tdap Vaccine  Incomplete      Below and/or attached are the medications your provider expects you to take. Review all of your home medications and newly ordered medications with your provider and/or pharmacist. Follow medication instructions as directed by your provider and/or pharmacist. Please keep your medication list with you and share with your provider. Update the information when medications are discontinued, doses are changed, or new medications (including over-the-counter products) are added; and carry medication information at all times in the event of emergency situations     Allergies:  ERYTHROMYCIN - (reactions not documented)     KEFLEX - (reactions not documented)     PCN - (reactions not documented)               Medications  Valid as of: April 24, 2017 -  1:27 PM    Generic Name Brand Name Tablet Size Instructions for use    Albuterol Sulfate   Inhale  by mouth.        Azelastine-Fluticasone (Suspension) DYMISTA 137-50 MCG/ACT         Azelastine-Fluticasone (Suspension) Azelastine-Fluticasone 137-50 MCG/ACT Spray 1 Spray  in nose 2 Times a Day.        Beclomethasone Dipropionate (Aero Soln) QVAR 80 MCG/ACT Inhale 1 Puff by mouth 2 times a day.          Hydrocodone-Acetaminophen (Tab) NORCO 5-325 MG Take 1 Tab by mouth every 8 hours as needed.        Ibuprofen (Tab) MOTRIN 800 MG Take 1 Tab by mouth every 8 hours as needed for Mild Pain.        Losartan Potassium (Tab) COZAAR 50 MG Take 1 Tab by mouth every day.        NIFEdipine (TABLET SR 24 HR) ADALAT CC 60 MG Take 1 Tab by mouth every day.        .                 Medicines prescribed today were sent to:     [id number] - [place], [place] - [address]    [address] [place] [address]    Phone: [phone] Fax: [phone]    Open 24 Hours?: No      Medication refill instructions:       If your prescription bottle indicates you have medication refills left, it is not necessary to call your provider’s office. Please contact your pharmacy and they will refill your medication.    If your prescription bottle indicates you do not have any refills left, you may request refills at any time through one of the following ways: The online [employer] system (except Urgent Care), by calling your provider’s office, or by asking your pharmacy to contact your provider’s office with a refill request. Medication refills are processed only during regular business hours and may not be available until the next business day. Your provider may request additional information or to have a follow-up visit with you prior to refilling your medication.   *Please Note: Medication refills are assigned a new Rx number when refilled electronically. Your pharmacy may indicate that no refills were authorized even though a new prescription for the same medication is available at the pharmacy. Please request the medicine by name with the pharmacy before contacting your provider for a refill.        Referral     A referral request has been sent to our  patient care coordination department. Please allow 3-5 business days for us to process this request and contact you either by phone or mail. If you do not hear from us by the 5th business day, please call us at [phone].        Instructions    Refer to endocrine for consultation    Continue care with rheumatology    Have mammogram and colonoscopy over the summer    Follow up with me annually and as needed    Call for refills when needed    Will get your outside records regarding PAP and will update your health record    TDaP today    Follow up with allergist as needed       Other Notes About Your Plan     Last UDS:  2/12/15  DR RICCI  Contolled Substance agreement signed:  2/12/15  DR RICCI               Correlechart Access Code: Activation code not generated  Current Idiro Status: Active

## 2017-04-24 NOTE — ASSESSMENT & PLAN NOTE
This is a chronic condition, stable and well-controlled on losartan and nifedipine. Her blood pressure today is 126/78. She denies symptoms of hypertension. She tolerates her medications well with no significant bothersome side effects. She does not need refills at this time, but can call for refills when needed. We will see her annually and as needed.

## 2017-04-24 NOTE — ASSESSMENT & PLAN NOTE
Patient is due for health maintenance exams. 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 She will also schedule her mammogram at the same time. She is up-to-date with labs. She will get TDaP today. It appears according to her health maintenance record she is overdue for Pap smear, but she states she had done last year or the year before. We will obtain her outside records. She is up-to-date with flu shot and pneumonia shot.

## 2017-04-24 NOTE — PATIENT INSTRUCTIONS
Refer to endocrine for consultation    Continue care with rheumatology    Have mammogram and colonoscopy over the summer    Follow up with me annually and as needed    Call for refills when needed    Will get your outside records regarding PAP and will update your health record    TDaP today    Follow up with allergist as needed

## 2017-04-24 NOTE — ASSESSMENT & PLAN NOTE
Patient has long history of asthma, it is stable and well controlled on current regimen including Qvar and as needed albuterol. She does see an allergist who does refill all of her inhaler medications.

## 2017-05-08 ENCOUNTER — OFFICE VISIT (OUTPATIENT)
Dept: RHEUMATOLOGY | Facility: PHYSICIAN GROUP | Age: 52
End: 2017-05-08
Payer: COMMERCIAL

## 2017-05-08 ENCOUNTER — HOSPITAL ENCOUNTER (OUTPATIENT)
Dept: RADIOLOGY | Facility: MEDICAL CENTER | Age: 52
End: 2017-05-08
Attending: INTERNAL MEDICINE
Payer: COMMERCIAL

## 2017-05-08 ENCOUNTER — HOSPITAL ENCOUNTER (OUTPATIENT)
Dept: LAB | Facility: MEDICAL CENTER | Age: 52
End: 2017-05-08
Attending: INTERNAL MEDICINE
Payer: COMMERCIAL

## 2017-05-08 VITALS
TEMPERATURE: 99 F | BODY MASS INDEX: 49.61 KG/M2 | DIASTOLIC BLOOD PRESSURE: 98 MMHG | WEIGHT: 293 LBS | OXYGEN SATURATION: 93 % | SYSTOLIC BLOOD PRESSURE: 174 MMHG | HEART RATE: 88 BPM | RESPIRATION RATE: 16 BRPM

## 2017-05-08 DIAGNOSIS — R20.2 TINGLING: ICD-10-CM

## 2017-05-08 DIAGNOSIS — M06.00 SERONEGATIVE RHEUMATOID ARTHRITIS (HCC): ICD-10-CM

## 2017-05-08 LAB
25(OH)D3 SERPL-MCNC: 26 NG/ML (ref 30–100)
BASOPHILS # BLD AUTO: 0.5 % (ref 0–1.8)
BASOPHILS # BLD: 0.05 K/UL (ref 0–0.12)
EOSINOPHIL # BLD AUTO: 0.28 K/UL (ref 0–0.51)
EOSINOPHIL NFR BLD: 2.5 % (ref 0–6.9)
ERYTHROCYTE [DISTWIDTH] IN BLOOD BY AUTOMATED COUNT: 44.2 FL (ref 35.9–50)
FOLATE SERPL-MCNC: 7.9 NG/ML
HCT VFR BLD AUTO: 42.7 % (ref 37–47)
HGB BLD-MCNC: 14.3 G/DL (ref 12–16)
IMM GRANULOCYTES # BLD AUTO: 0.04 K/UL (ref 0–0.11)
IMM GRANULOCYTES NFR BLD AUTO: 0.4 % (ref 0–0.9)
LYMPHOCYTES # BLD AUTO: 3.35 K/UL (ref 1–4.8)
LYMPHOCYTES NFR BLD: 30.4 % (ref 22–41)
MCH RBC QN AUTO: 30 PG (ref 27–33)
MCHC RBC AUTO-ENTMCNC: 33.5 G/DL (ref 33.6–35)
MCV RBC AUTO: 89.5 FL (ref 81.4–97.8)
MONOCYTES # BLD AUTO: 0.66 K/UL (ref 0–0.85)
MONOCYTES NFR BLD AUTO: 6 % (ref 0–13.4)
NEUTROPHILS # BLD AUTO: 6.64 K/UL (ref 2–7.15)
NEUTROPHILS NFR BLD: 60.2 % (ref 44–72)
NRBC # BLD AUTO: 0 K/UL
NRBC BLD AUTO-RTO: 0 /100 WBC
PLATELET # BLD AUTO: 326 K/UL (ref 164–446)
PMV BLD AUTO: 9.5 FL (ref 9–12.9)
RBC # BLD AUTO: 4.77 M/UL (ref 4.2–5.4)
VIT B12 SERPL-MCNC: 542 PG/ML (ref 211–911)
WBC # BLD AUTO: 11 K/UL (ref 4.8–10.8)

## 2017-05-08 PROCEDURE — 71020 DX-CHEST-2 VIEWS: CPT

## 2017-05-08 PROCEDURE — 82746 ASSAY OF FOLIC ACID SERUM: CPT

## 2017-05-08 PROCEDURE — 85025 COMPLETE CBC W/AUTO DIFF WBC: CPT

## 2017-05-08 PROCEDURE — 36415 COLL VENOUS BLD VENIPUNCTURE: CPT

## 2017-05-08 PROCEDURE — 82607 VITAMIN B-12: CPT

## 2017-05-08 PROCEDURE — 82306 VITAMIN D 25 HYDROXY: CPT

## 2017-05-08 PROCEDURE — 99214 OFFICE O/P EST MOD 30 MIN: CPT | Performed by: INTERNAL MEDICINE

## 2017-05-08 RX ORDER — HYDROXYCHLOROQUINE SULFATE 200 MG/1
200 TABLET, FILM COATED ORAL 2 TIMES DAILY
Qty: 60 TAB | Refills: 1 | Status: SHIPPED | OUTPATIENT
Start: 2017-05-08 | End: 2017-08-07 | Stop reason: SDUPTHER

## 2017-05-08 NOTE — MR AVS SNAPSHOT
Gabriella Clifford   2017 1:30 PM   Office Visit   MRN: [id number]    Department:  Rheumatology [facility]   Dept Phone:  [phone]    Description:  Female : 1965   Provider:  Maylin Willingham M.D.           Reason for Visit     Follow-Up follow up      Allergies as of 2017     Allergen Noted Reactions    Erythromycin 2013       Keflex 2013       Pcn [Penicillins] 2013         You were diagnosed with     Seronegative rheumatoid arthritis (CMS-HCC)   [863473]       Tingling   [862504]         Vital Signs     Blood Pressure Pulse Temperature Respirations Weight Oxygen Saturation    174/98 mmHg 88 37.2 °C (99 °F) 16 141.522 kg (312 lb) 93%    Last Menstrual Period Breastfeeding? Smoking Status             2016 No Former Smoker         Basic Information     Date Of Birth Sex Race Ethnicity Preferred Language    1965 Female White Non- English      Your appointments     May 23, 2017  1:00 PM   Telemedicine Clinic New Patient with Brenda Louis M.D., TELEMED GYN, TELEMEDICINE [place]   Centralized Scheduling (--)    [address]  [facility] [id number] [phone]            2017  9:30 AM   New Patient with Afshin Guardado M.D.   [facility]    [address], [address]  [facility] [id number] [phone]           Please bring Photo ID, Insurance Cards, All Medication Bottles and copies of any legal documents (such as Living Will, Power of ) If speaking a language besides English please bring an adult . Please arrive 30 minutes prior for check in and registration. You will be receiving a confirmation call a few days before your appointment from our automated call confirmation system.            Aug 03, 2017  1:30 PM   Follow Up Visit with Maylin Willingham M.D.   [facility] (--)    [address], [address]  [facility] [id number] [phone]           You will be receiving a confirmation  call a few days before your appointment from our automated call confirmation system.              Problem List              ICD-10-CM Priority Class Noted - Resolved    Urticaria, chronic L50.8   1/3/2013 - Present    Hypertension I10   1/3/2013 - Present    Asthma in adult J45.909   1/3/2013 - Present    Shoulder pain M25.519   1/3/2013 - Present    Joint pain M25.50   8/27/2013 - Present    Multiple thyroid nodules E04.2   2/2/2014 - Present    Subclinical hyperthyroidism E05.90   7/31/2014 - Present    Graves disease E05.00   2/12/2015 - Present    Urinary urgency R39.15   9/22/2015 - Present    Burning pain R52   9/22/2015 - Present    Chronic cough R05   12/22/2015 - Present    Morbid obesity with BMI of 45.0-49.9, adult (HCC) E66.01, Z68.42   3/14/2017 - Present    Health care maintenance Z00.00   4/24/2017 - Present      Health Maintenance        Date Due Completion Dates    MAMMOGRAM 4/30/2015 4/30/2014, 1/17/2013, 1/3/2005 (N/S)    Override on 1/3/2005: (N/S)    COLONOSCOPY 8/9/2016 ---    PAP SMEAR 3/2/2017 3/2/2014 (Prv Comp)    Override on 3/2/2014: Previously completed    IMM DTaP/Tdap/Td Vaccine (2 - Td) 4/24/2027 4/24/2017            Current Immunizations     Influenza TIV (IM) 10/30/2013  7:51 AM    Influenza Vaccine Quad Inj (Pf) 10/15/2014    Pneumococcal polysaccharide vaccine (PPSV-23) 3/14/2017    Tdap Vaccine 4/24/2017      Below and/or attached are the medications your provider expects you to take. Review all of your home medications and newly ordered medications with your provider and/or pharmacist. Follow medication instructions as directed by your provider and/or pharmacist. Please keep your medication list with you and share with your provider. Update the information when medications are discontinued, doses are changed, or new medications (including over-the-counter products) are added; and carry medication information at all times in the event of emergency situations     Allergies:   ERYTHROMYCIN - (reactions not documented)     KEFLEX - (reactions not documented)     PCN - (reactions not documented)               Medications  Valid as of: May 08, 2017 -  2:16 PM    Generic Name Brand Name Tablet Size Instructions for use    Albuterol Sulfate   Inhale  by mouth.        Azelastine-Fluticasone (Suspension) DYMISTA 137-50 MCG/ACT         Azelastine-Fluticasone (Suspension) Azelastine-Fluticasone 137-50 MCG/ACT Spray 1 Spray in nose 2 Times a Day.        Beclomethasone Dipropionate (Aero Soln) QVAR 80 MCG/ACT Inhale 1 Puff by mouth 2 times a day.          Hydrocodone-Acetaminophen (Tab) NORCO 5-325 MG Take 1 Tab by mouth every 8 hours as needed.        Hydroxychloroquine Sulfate (Tab) PLAQUENIL 200 MG Take 1 Tab by mouth 2 times a day.        Ibuprofen (Tab) MOTRIN 800 MG Take 1 Tab by mouth every 8 hours as needed for Mild Pain.        Losartan Potassium (Tab) COZAAR 50 MG Take 1 Tab by mouth every day.        NIFEdipine (TABLET SR 24 HR) ADALAT CC 60 MG Take 1 Tab by mouth every day.        .                 Medicines prescribed today were sent to:     [email or web address] [address] [place] [address] [address] [place] [address] [address] [place]    [address] [facility] [address] [facility] [address]    Phone: [phone] Fax: [phone]    Open 24 Hours?: No      Medication refill instructions:       If your prescription bottle indicates you have medication refills left, it is not necessary to call your provider’s office. Please contact your pharmacy and they will refill your medication.    If your prescription bottle indicates you do not have any refills left, you may request refills at any time through one of the following ways: The online [employer] system (except Urgent Care), by calling your provider’s office, or by asking your pharmacy to contact your provider’s office with a refill request. Medication refills are processed only during regular business hours and may not be available until the next  business day. Your provider may request additional information or to have a follow-up visit with you prior to refilling your medication.   *Please Note: Medication refills are assigned a new Rx number when refilled electronically. Your pharmacy may indicate that no refills were authorized even though a new prescription for the same medication is available at the pharmacy. Please request the medicine by name with the pharmacy before contacting your provider for a refill.        Your To Do List     Future Labs/Procedures Complete By Expires    CRP QUANTITIVE (NON-CARDIAC)  5/20/2017 5/8/2018    CBC WITH DIFFERENTIAL  As directed 5/8/2018    CBC WITH DIFFERENTIAL  As directed 5/8/2018    COMP METABOLIC PANEL  As directed 5/8/2018    DX-CHEST-2 VIEWS  As directed 5/8/2018    FOLATE  As directed 5/8/2018    VITAMIN B12  As directed 5/8/2018    VITAMIN D,25 HYDROXY  As directed 5/8/2018    WESTERGREN SED RATE  As directed 5/8/2018      Referral     A referral request has been sent to our patient care coordination department. Please allow 3-5 business days for us to process this request and contact you either by phone or mail. If you do not hear from us by the 5th business day, please call us at [phone].        Other Notes About Your Plan     Last UDS:  2/12/15  DR RICCI  Contolled Substance agreement signed:  2/12/15  DR RICCI               MyChart Access Code: Activation code not generated  Current MyChart Status: Active

## 2017-05-08 NOTE — Clinical Note
[facility]   [address], [address]  [place] [address]  Phone: [phone]  Fax: [phone]              Encounter Date: 5/8/2017    Dear Dr. King,    It was a pleasure seeing your patient, Gabriella Clifford, on 5/8/2017. Diagnoses of Seronegative rheumatoid arthritis (CMS-HCC) and Tingling were pertinent to this visit.     Please find attached progress note which includes the history I obtained from Ms. Clifford, my physical examination findings, my impression and recommendations.      Once again, it was a pleasure participating in your patient's care.  Please feel free to contact me if you have any questions or if I can be of any further assistance to your patients.      Sincerely,    Maylin Willingham M.D.  Electronically Signed          PROGRESS NOTE:  Subjective:   Date of Consultation:5/8/2017  1:32 PM  Primary care physician:Brenda King, VALERIE      Reason for Consultation:  Ms. Clifford  is a pleasant 51 y.o. year old female who presented with follow-up of a polyarthralgia        Polyarthralgia  Since her last visit she continues to have joint pains in her MCP, wrists, shoulders.  She reports prolonged morning stiffness and swelling.  She presents with pictures of hand swelling.    Urticaria (autoimmune)  quiescent        RHEUM HISTORY:  Ms. Gabriella Clifford first presented to me with a history of autoimmune urticaria and elevated CRP.  She had been previously managed with plaquenil for her autoimmune urticaria as well as histamine blockers.  More recently to her initial visit she reports worsening hand pain and bilateral knee pain.  She had managed with 800 mg ibuprofen.  She also reported 1-2 hours of morning stiffness with swollen joint and stiff hands.      Pertinent serologies  Rheumatoid factor is negative  Anti-CCP is negative    Evaluation showed an elevated CRP of 1.76 and a elevated ESR of 20    Hand x-ray from March 16, 2017 showed no evidence of any marginal  erosions or periosteal reaction.    Past medical history: Graves, autoimmune urticaria, asthma, high blood pressure diagnosed in 1995.  No miscarriages.  She did have HTN/toxemia during her second pregnancy.  She denies any blood clot history.  Adult onset asthma. She had jaw surgery in middle school. In the 1990s she had tubal ligation. 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      Family history: Rheumatoid arthritis, mom and great aunt had arthritis. First cousin had thyroid Hashimoto's. Father had brain aneurysm.    Social history: Former smoker (social smoker once a week if any), occasional alcohol use. No IV drug use.    Past Medical History   Diagnosis Date   • Urticaria, chronic 1/3/2013   • Hypertension    • Bursitis of shoulder, left    • Rotator cuff tear, left      Past Surgical History   Procedure Laterality Date   • Rotator cuff repair       left   • Carpal tunnel release     • Tubal coagulation laparoscopic bilateral     • Shoulder surgery     • Cubital tunnel release     • Carpal tunnel release     • Other       jaw surgery     Allergies   Allergen Reactions   • Erythromycin    • Keflex    • Pcn [Penicillins]      Outpatient Encounter Prescriptions as of 5/8/2017   Medication Sig Dispense Refill   • hydroxychloroquine (PLAQUENIL) 200 MG Tab Take 1 Tab by mouth 2 times a day. 60 Tab 1   • ibuprofen (MOTRIN) 800 MG Tab Take 1 Tab by mouth every 8 hours as needed for Mild Pain. 90 Tab 3   • losartan (COZAAR) 50 MG Tab Take 1 Tab by mouth every day. 90 Tab 3   • hydrocodone-acetaminophen (NORCO) 5-325 MG Tab per tablet Take 1 Tab by mouth every 8 hours as needed. 60 Tab 0   • NIFEdipine (ADALAT CC) 60 MG CR tablet Take 1 Tab by mouth every day. 90 Tab 3   • Azelastine-Fluticasone (DYMISTA) 137-50 MCG/ACT Suspension Spray 1 Spray in nose 2 Times a Day. 1 Bottle 0   • DYMISTA 137-50 MCG/ACT Suspension   6   • Albuterol Sulfate (PROAIR HFA  INH) Inhale  by mouth.     • beclomethasone (QVAR) 80 MCG/ACT inhaler Inhale 1 Puff by mouth 2 times a day.         No facility-administered encounter medications on file as of 5/8/2017.       Social History     Social History   • Marital Status:      Spouse Name: N/A   • Number of Children: N/A   • Years of Education: N/A     Occupational History   • Not on file.     Social History Main Topics   • Smoking status: Former Smoker   • Smokeless tobacco: Never Used   • Alcohol Use: 0.0 oz/week     0 Standard drinks or equivalent per week      Comment: Occassionally   • Drug Use: No   • Sexual Activity: Yes     Other Topics Concern   • Not on file     Social History Narrative      History   Smoking status   • Former Smoker   Smokeless tobacco   • Never Used     History   Alcohol Use   • 0.0 oz/week   • 0 Standard drinks or equivalent per week     Comment: Occassionally     History   Drug Use No      OB History   No data available      Patient's last menstrual period was 09/01/2016.    G P A L     Family History   Problem Relation Age of Onset   • Arthritis Mother      RA   • Cancer Mother      breast   • Hypertension Father    • Cancer Maternal Grandmother        Review of Systems   Constitutional: Negative for fever and chills.   Musculoskeletal: Positive for joint pain.   Skin: Negative for rash.   Neurological: Positive for tingling.        Tingling on the hands        Objective:   /98 mmHg  Pulse 88  Temp(Src) 37.2 °C (99 °F)  Resp 16  Wt 141.522 kg (312 lb)  SpO2 93%  LMP 09/01/2016  Breastfeeding? No    Physical Exam   Constitutional: She is oriented to person, place, and time. She appears well-developed and well-nourished. No distress.   HENT:   Head: Normocephalic and atraumatic.   Right Ear: External ear normal.   Left Ear: External ear normal.   Eyes: Conjunctivae are normal. Right eye exhibits no discharge. Left eye exhibits no discharge.   Pulmonary/Chest: Breath sounds normal. No stridor.    Musculoskeletal: She exhibits no edema.   No synovitis appreciated on exam. Bilateral tenderness on the MTP   Neurological: She is alert and oriented to person, place, and time. No cranial nerve deficit.   Skin: Skin is warm and dry. No rash noted. She is not diaphoretic. No erythema. No pallor.   Psychiatric: She has a normal mood and affect. Her behavior is normal. Judgment and thought content normal.       Assessment:     1. Seronegative rheumatoid arthritis (CMS-HCC)  hydroxychloroquine (PLAQUENIL) 200 MG Tab    CBC WITH DIFFERENTIAL    COMP METABOLIC PANEL    DX-CHEST-2 VIEWS    WESTERGREN SED RATE    CRP QUANTITIVE (NON-CARDIAC)    REFERRAL TO OPHTHALMOLOGY    CBC WITH DIFFERENTIAL   2. Tingling  VITAMIN D,25 HYDROXY    VITAMIN B12    FOLATE     Labs:      No results found for: QNTTBGOLD  No results found for: HEPBCORTOT, HEPBCORIGM, HEPBSAG  No results found for: HEPCAB  Lab Results   Component Value Date/Time    SODIUM 139 04/18/2017 03:37 PM    POTASSIUM 3.5* 04/18/2017 03:37 PM    CHLORIDE 104 04/18/2017 03:37 PM    CO2 24 04/18/2017 03:37 PM    GLUCOSE 155* 04/18/2017 03:37 PM    BUN 22 04/18/2017 03:37 PM    CREATININE 0.86 04/18/2017 03:37 PM      Lab Results   Component Value Date/Time    WBC 14.1* 04/18/2017 03:37 PM    RBC 4.89 04/18/2017 03:37 PM    HEMOGLOBIN 15.0 04/18/2017 03:37 PM    HEMATOCRIT 44.0 04/18/2017 03:37 PM    MCV 90.0 04/18/2017 03:37 PM    MCH 30.7 04/18/2017 03:37 PM    MCHC 34.1 04/18/2017 03:37 PM    MPV 9.9 04/18/2017 03:37 PM    NEUTROPHILS-POLYS 64.00 04/18/2017 03:37 PM    LYMPHOCYTES 29.00 04/18/2017 03:37 PM    MONOCYTES 3.80 04/18/2017 03:37 PM    EOSINOPHILS 2.30 04/18/2017 03:37 PM    BASOPHILS 0.60 04/18/2017 03:37 PM    HYPOCHROMIA 1+ 07/08/2014 09:03 AM      Lab Results   Component Value Date/Time    CALCIUM 9.5 04/18/2017 03:37 PM    AST(SGOT) 14 04/18/2017 03:37 PM    ALT(SGPT) 20 04/18/2017 03:37 PM    ALKALINE PHOSPHATASE 55 04/18/2017 03:37 PM    TOTAL  BILIRUBIN 0.3 04/18/2017 03:37 PM    ALBUMIN 4.1 04/18/2017 03:37 PM    TOTAL PROTEIN 7.4 04/18/2017 03:37 PM     Lab Results   Component Value Date/Time    URIC ACID 3.1 04/18/2017 03:37 PM    RHEUMATOID FACTOR -NEPH- <10 03/14/2017 08:14 AM    CCP ANTIBODIES 3 03/14/2017 08:14 AM    ANTINUCLEAR ANTIBODY None Detected 04/18/2017 03:37 PM     Lab Results   Component Value Date/Time    SED RATE WESTERGREN 13 04/18/2017 03:37 PM    STAT C-REACTIVE PROTEIN 1.76* 04/18/2017 03:37 PM     No results found for: RUSSDENNISE, VVTINTERP  Lab Results   Component Value Date/Time    C3 COMPLEMENT 184.0 04/18/2017 03:37 PM    COMPLEMENT C4 48.0 04/18/2017 03:37 PM     Lab Results   Component Value Date/Time    ANTI-DNA -DS None Detected 04/18/2017 03:37 PM    RNP ANTIBODIES 0 04/18/2017 03:37 PM    SMITH ANTIBODIES 0 04/18/2017 03:37 PM    ANTI-SCL-70 0 04/18/2017 03:37 PM    ANTI-CENTROMERE B AB 2 04/18/2017 03:37 PM     Lab Results   Component Value Date/Time    ANTI-DNA -DS None Detected 04/18/2017 03:37 PM    ANCA IGG <1:20 04/18/2017 03:37 PM    C3 COMPLEMENT 184.0 04/18/2017 03:37 PM    RNP ANTIBODIES 0 04/18/2017 03:37 PM    SJOGREN'S ANTI-SS-B 1 04/18/2017 03:37 PM     Lab Results   Component Value Date/Time    COLOR Lt. Yellow 04/18/2017 03:37 PM    SPECIFIC GRAVITY 1.021 04/18/2017 03:37 PM    PH 5.5 04/18/2017 03:37 PM    GLUCOSE Negative 04/18/2017 03:37 PM    KETONES Negative 04/18/2017 03:37 PM    PROTEIN Negative 04/18/2017 03:37 PM     No results found for: TOTPROTUR, TOTALVOLUME, QYFWTMSS50  Lab Results   Component Value Date/Time    SSA 60 (R0)(SANJUANA) AB, IGG 14 04/18/2017 03:37 PM    SSA 52 (R0)(SANJUANA) AB, IGG 6 04/18/2017 03:37 PM     Lab Results   Component Value Date/Time    GLYCOHEMOGLOBIN 5.3 01/18/2013 10:59 AM     No results found for: CPKTOTAL  Lab Results   Component Value Date/Time    G-6-PD 12.6 04/18/2017 03:37 PM     No results found for: IVRX41ZTMN  No results found for: ACESERUM  Lab Results      Component Value Date/Time    25-HYDROXY   VITAMIN D 25 30 05/19/2015 11:33 AM     No results found for: TSH, FREEDIR  Lab Results   Component Value Date/Time    TSH 0.710 01/19/2017 10:27 AM    FREE T-4 0.78 01/19/2017 10:27 AM     Lab Results   Component Value Date/Time    MICROSOMAL -TPO- ABS 1.2 01/15/2014 02:32 PM    ANTI-THYROGLOBULIN <0.9 01/03/2014 10:40 AM     No results found for: IGGLYMEABS  No results found for: ANTIMITOCHO, FACTIN  No results found for: IGA, TTRANSIGA, ENDOIGA  No results found for: FLTYPE, CRYSTALSBDF  No results found for: ISTATICAL  No results found for: ISTATCREAT  No results found for: CTELOPEP  No results found for: GBMABG  No results found for: PTHINTACT      Medical Decision Making:  Today's Assessment / Status / Plan:     Seronegative rheumatoid arthritis  Swelling of the MCP appreciated on pictures she has brought in with prolonged history of morning stiffness.  Will start plaquenil 400 mg po q day.     I reviewed the side effects of hydroxychloroquine including but not limited to headache, retinal toxicity, skin rash.  I emphasized the importance of baseline and annual follow-up with evaluation with ophthalmology.    Autoimmune urticaria  Will monitor  For now stable      Tingling - peripheral neuropathy???  Will check B12 and folate    I have spent greater than 50% of this 30 minute visit in counseling/coordination of care with the patient regarding her labs as well as next steps and side effects of plaquenil..    She agreed and verbalized her agreement and understanding with the current plan. I answered all questions and concerns she has at this time and advised her to call at any time in there interim with questions or concerns in regards to her care.    Thank you for allowing me to participate in her care, I will continue to follow closely.

## 2017-05-08 NOTE — PROGRESS NOTES
Subjective:   Date of Consultation:5/8/2017  1:32 PM  Primary care physician:VALERIE Lopez      Reason for Consultation:  Ms. Clifford  is a pleasant 51 y.o. year old female who presented with follow-up of a polyarthralgia        Polyarthralgia  Since her last visit she continues to have joint pains in her MCP, wrists, shoulders.  She reports prolonged morning stiffness and swelling.  She presents with pictures of hand swelling.    Urticaria (autoimmune)  quiescent        RHEUM HISTORY:  Ms. Gabriella Clifford first presented to me with a history of autoimmune urticaria and elevated CRP.  She had been previously managed with plaquenil for her autoimmune urticaria as well as histamine blockers.  More recently to her initial visit she reports worsening hand pain and bilateral knee pain.  She had managed with 800 mg ibuprofen.  She also reported 1-2 hours of morning stiffness with swollen joint and stiff hands.      Pertinent serologies  Rheumatoid factor is negative  Anti-CCP is negative    Evaluation showed an elevated CRP of 1.76 and a elevated ESR of 20    Hand x-ray from March 16, 2017 showed no evidence of any marginal erosions or periosteal reaction.    Past medical history: Graves, autoimmune urticaria, asthma, high blood pressure diagnosed in 1995.  No miscarriages.  She did have HTN/toxemia during her second pregnancy.  She denies any blood clot history.  Adult onset asthma. She had jaw surgery in middle school. In the 1990s she had tubal ligation. 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      Family history: Rheumatoid arthritis, mom and great aunt had arthritis. First cousin had thyroid Hashimoto's. Father had brain aneurysm.    Social history: Former smoker (social smoker once a week if any), occasional alcohol use. No IV drug use.    Past Medical History   Diagnosis Date   • Urticaria, chronic 1/3/2013   • Hypertension    •  Bursitis of shoulder, left    • Rotator cuff tear, left      Past Surgical History   Procedure Laterality Date   • Rotator cuff repair       left   • Carpal tunnel release     • Tubal coagulation laparoscopic bilateral     • Shoulder surgery     • Cubital tunnel release     • Carpal tunnel release     • Other       jaw surgery     Allergies   Allergen Reactions   • Erythromycin    • Keflex    • Pcn [Penicillins]      Outpatient Encounter Prescriptions as of 5/8/2017   Medication Sig Dispense Refill   • hydroxychloroquine (PLAQUENIL) 200 MG Tab Take 1 Tab by mouth 2 times a day. 60 Tab 1   • ibuprofen (MOTRIN) 800 MG Tab Take 1 Tab by mouth every 8 hours as needed for Mild Pain. 90 Tab 3   • losartan (COZAAR) 50 MG Tab Take 1 Tab by mouth every day. 90 Tab 3   • hydrocodone-acetaminophen (NORCO) 5-325 MG Tab per tablet Take 1 Tab by mouth every 8 hours as needed. 60 Tab 0   • NIFEdipine (ADALAT CC) 60 MG CR tablet Take 1 Tab by mouth every day. 90 Tab 3   • Azelastine-Fluticasone (DYMISTA) 137-50 MCG/ACT Suspension Spray 1 Spray in nose 2 Times a Day. 1 Bottle 0   • DYMISTA 137-50 MCG/ACT Suspension   6   • Albuterol Sulfate (PROAIR HFA INH) Inhale  by mouth.     • beclomethasone (QVAR) 80 MCG/ACT inhaler Inhale 1 Puff by mouth 2 times a day.         No facility-administered encounter medications on file as of 5/8/2017.       Social History     Social History   • Marital Status:      Spouse Name: N/A   • Number of Children: N/A   • Years of Education: N/A     Occupational History   • Not on file.     Social History Main Topics   • Smoking status: Former Smoker   • Smokeless tobacco: Never Used   • Alcohol Use: 0.0 oz/week     0 Standard drinks or equivalent per week      Comment: Occassionally   • Drug Use: No   • Sexual Activity: Yes     Other Topics Concern   • Not on file     Social History Narrative      History   Smoking status   • Former Smoker   Smokeless tobacco   • Never Used     History   Alcohol Use    • 0.0 oz/week   • 0 Standard drinks or equivalent per week     Comment: Occassionally     History   Drug Use No      OB History   No data available      Patient's last menstrual period was 09/01/2016.    G [facility]     Family History   Problem Relation Age of Onset   • Arthritis Mother      RA   • Cancer Mother      breast   • Hypertension Father    • Cancer Maternal Grandmother        Review of Systems   Constitutional: Negative for fever and chills.   Musculoskeletal: Positive for joint pain.   Skin: Negative for rash.   Neurological: Positive for tingling.        Tingling on the hands        Objective:   /98 mmHg  Pulse 88  Temp(Src) 37.2 °C (99 °F)  Resp 16  Wt 141.522 kg (312 lb)  SpO2 93%  LMP 09/01/2016  Breastfeeding? No    Physical Exam   Constitutional: She is oriented to person, place, and time. She appears well-developed and well-nourished. No distress.   HENT:   Head: Normocephalic and atraumatic.   Right Ear: External ear normal.   Left Ear: External ear normal.   Eyes: Conjunctivae are normal. Right eye exhibits no discharge. Left eye exhibits no discharge.   Pulmonary/Chest: Breath sounds normal. No stridor.   Musculoskeletal: She exhibits no edema.   No synovitis appreciated on exam. Bilateral tenderness on the MTP   Neurological: She is alert and oriented to person, place, and time. No cranial nerve deficit.   Skin: Skin is warm and dry. No rash noted. She is not diaphoretic. No erythema. No pallor.   Psychiatric: She has a normal mood and affect. Her behavior is normal. Judgment and thought content normal.       Assessment:     1. Seronegative rheumatoid arthritis (CMS-HCC)  hydroxychloroquine (PLAQUENIL) 200 MG Tab    CBC WITH DIFFERENTIAL    COMP METABOLIC PANEL    DX-CHEST-2 VIEWS    WESTERGREN SED RATE    CRP QUANTITIVE (NON-CARDIAC)    REFERRAL TO OPHTHALMOLOGY    CBC WITH DIFFERENTIAL   2. Tingling  VITAMIN D,25 HYDROXY    VITAMIN B12    FOLATE     Labs:      No results found  for: QNTTBGOLD  No results found for: HEPBCORTOT, HEPBCORIGM, HEPBSAG  No results found for: HEPCAB  Lab Results   Component Value Date/Time    SODIUM 139 04/18/2017 03:37 PM    POTASSIUM 3.5* 04/18/2017 03:37 PM    CHLORIDE 104 04/18/2017 03:37 PM    CO2 24 04/18/2017 03:37 PM    GLUCOSE 155* 04/18/2017 03:37 PM    BUN 22 04/18/2017 03:37 PM    CREATININE 0.86 04/18/2017 03:37 PM      Lab Results   Component Value Date/Time    WBC 14.1* 04/18/2017 03:37 PM    RBC 4.89 04/18/2017 03:37 PM    HEMOGLOBIN 15.0 04/18/2017 03:37 PM    HEMATOCRIT 44.0 04/18/2017 03:37 PM    MCV 90.0 04/18/2017 03:37 PM    MCH 30.7 04/18/2017 03:37 PM    MCHC 34.1 04/18/2017 03:37 PM    MPV 9.9 04/18/2017 03:37 PM    NEUTROPHILS-POLYS 64.00 04/18/2017 03:37 PM    LYMPHOCYTES 29.00 04/18/2017 03:37 PM    MONOCYTES 3.80 04/18/2017 03:37 PM    EOSINOPHILS 2.30 04/18/2017 03:37 PM    BASOPHILS 0.60 04/18/2017 03:37 PM    HYPOCHROMIA 1+ 07/08/2014 09:03 AM      Lab Results   Component Value Date/Time    CALCIUM 9.5 04/18/2017 03:37 PM    AST(SGOT) 14 04/18/2017 03:37 PM    ALT(SGPT) 20 04/18/2017 03:37 PM    ALKALINE PHOSPHATASE 55 04/18/2017 03:37 PM    TOTAL BILIRUBIN 0.3 04/18/2017 03:37 PM    ALBUMIN 4.1 04/18/2017 03:37 PM    TOTAL PROTEIN 7.4 04/18/2017 03:37 PM     Lab Results   Component Value Date/Time    URIC ACID 3.1 04/18/2017 03:37 PM    RHEUMATOID FACTOR -NEPH- <10 03/14/2017 08:14 AM    CCP ANTIBODIES 3 03/14/2017 08:14 AM    ANTINUCLEAR ANTIBODY None Detected 04/18/2017 03:37 PM     Lab Results   Component Value Date/Time    SED RATE WESTERGREN 13 04/18/2017 03:37 PM    STAT C-REACTIVE PROTEIN 1.76* 04/18/2017 03:37 PM     No results found for: MARLON YOUNG  Lab Results   Component Value Date/Time    C3 COMPLEMENT 184.0 04/18/2017 03:37 PM    COMPLEMENT C4 48.0 04/18/2017 03:37 PM     Lab Results   Component Value Date/Time    ANTI-DNA -DS None Detected 04/18/2017 03:37 PM    RNP ANTIBODIES 0 04/18/2017 03:37 PM     SMITH ANTIBODIES 0 04/18/2017 03:37 PM    ANTI-SCL-70 0 04/18/2017 03:37 PM    ANTI-CENTROMERE B AB 2 04/18/2017 03:37 PM     Lab Results   Component Value Date/Time    ANTI-DNA -DS None Detected 04/18/2017 03:37 PM    ANCA IGG <1:20 04/18/2017 03:37 PM    C3 COMPLEMENT 184.0 04/18/2017 03:37 PM    RNP ANTIBODIES 0 04/18/2017 03:37 PM    SJOGREN'S ANTI-SS-B 1 04/18/2017 03:37 PM     Lab Results   Component Value Date/Time    COLOR Lt. Yellow 04/18/2017 03:37 PM    SPECIFIC GRAVITY 1.021 04/18/2017 03:37 PM    PH 5.5 04/18/2017 03:37 PM    GLUCOSE Negative 04/18/2017 03:37 PM    KETONES Negative 04/18/2017 03:37 PM    PROTEIN Negative 04/18/2017 03:37 PM     No results found for: TOTPROTUR, TOTALVOLUME, [id number]  Lab Results   Component Value Date/Time    SSA 60 (R0)(SANJUANA) AB, IGG 14 04/18/2017 03:37 PM    SSA 52 (R0)(SANJUANA) AB, IGG 6 04/18/2017 03:37 PM     Lab Results   Component Value Date/Time    GLYCOHEMOGLOBIN 5.3 01/18/2013 10:59 AM     No results found for: CPKTOTAL  Lab Results   Component Value Date/Time    G-6-PD 12.6 04/18/2017 03:37 PM     No results found for: [id number]  No results found for: ACESERUM  Lab Results   Component Value Date/Time    25-HYDROXY   VITAMIN D 25 30 05/19/2015 11:33 AM     No results found for: TSH, FREEDIR  Lab Results   Component Value Date/Time    TSH 0.710 01/19/2017 10:27 AM    FREE T-4 0.78 01/19/2017 10:27 AM     Lab Results   Component Value Date/Time    MICROSOMAL -TPO- ABS 1.2 01/15/2014 02:32 PM    ANTI-THYROGLOBULIN <0.9 01/03/2014 10:40 AM     No results found for: IGGLYMEABS  No results found for: ANTIMITOCHO, FACTIN  No results found for: IGA, TTRANSIGA, ENDOIGA  No results found for: FLTYPE, CRYSTALSBDF  No results found for: ISTATICAL  No results found for: ISTATCREAT  No results found for: CTELOPEP  No results found for: GBMABG  No results found for: PTHINTACT      Medical Decision Making:  Today's Assessment / Status / Plan:     Seronegative rheumatoid  arthritis  Swelling of the MCP appreciated on pictures she has brought in with prolonged history of morning stiffness.  Will start plaquenil 400 mg po q day.     I reviewed the side effects of hydroxychloroquine including but not limited to headache, retinal toxicity, skin rash.  I emphasized the importance of baseline and annual follow-up with evaluation with ophthalmology.    Autoimmune urticaria  Will monitor  For now stable      Tingling - peripheral neuropathy???  Will check B12 and folate    I have spent greater than 50% of this 30 minute visit in counseling/coordination of care with the patient regarding her labs as well as next steps and side effects of plaquenil..    She agreed and verbalized her agreement and understanding with the current plan. I answered all questions and concerns she has at this time and advised her to call at any time in there interim with questions or concerns in regards to her care.    Thank you for allowing me to participate in her care, I will continue to follow closely.

## 2017-05-09 ENCOUNTER — PATIENT MESSAGE (OUTPATIENT)
Dept: RHEUMATOLOGY | Facility: PHYSICIAN GROUP | Age: 52
End: 2017-05-09

## 2017-05-09 DIAGNOSIS — L50.8 URTICARIA, CHRONIC: ICD-10-CM

## 2017-05-09 DIAGNOSIS — J98.11 MILD BIBASILAR ATELECTASIS: ICD-10-CM

## 2017-05-09 ASSESSMENT — ENCOUNTER SYMPTOMS
FEVER: 0
CHILLS: 0
TINGLING: 1

## 2017-05-11 ENCOUNTER — HOSPITAL ENCOUNTER (EMERGENCY)
Facility: MEDICAL CENTER | Age: 52
End: 2017-05-11
Attending: EMERGENCY MEDICINE
Payer: COMMERCIAL

## 2017-05-11 ENCOUNTER — OFFICE VISIT (OUTPATIENT)
Dept: MEDICAL GROUP | Facility: PHYSICIAN GROUP | Age: 52
End: 2017-05-11
Payer: COMMERCIAL

## 2017-05-11 VITALS
HEIGHT: 66 IN | DIASTOLIC BLOOD PRESSURE: 73 MMHG | OXYGEN SATURATION: 92 % | BODY MASS INDEX: 47.09 KG/M2 | TEMPERATURE: 98.3 F | SYSTOLIC BLOOD PRESSURE: 176 MMHG | HEART RATE: 76 BPM | WEIGHT: 293 LBS | RESPIRATION RATE: 20 BRPM

## 2017-05-11 VITALS
OXYGEN SATURATION: 98 % | TEMPERATURE: 98.2 F | SYSTOLIC BLOOD PRESSURE: 156 MMHG | HEIGHT: 66 IN | RESPIRATION RATE: 16 BRPM | HEART RATE: 89 BPM | DIASTOLIC BLOOD PRESSURE: 92 MMHG | WEIGHT: 293 LBS | BODY MASS INDEX: 47.09 KG/M2

## 2017-05-11 DIAGNOSIS — G47.30 SLEEP-DISORDERED BREATHING: ICD-10-CM

## 2017-05-11 DIAGNOSIS — G47.30 SLEEP APNEA IN ADULT: ICD-10-CM

## 2017-05-11 DIAGNOSIS — I10 ESSENTIAL HYPERTENSION: ICD-10-CM

## 2017-05-11 DIAGNOSIS — R00.2 PALPITATIONS: ICD-10-CM

## 2017-05-11 LAB
EKG IMPRESSION: NORMAL
EKG IMPRESSION: NORMAL

## 2017-05-11 PROCEDURE — 93005 ELECTROCARDIOGRAM TRACING: CPT

## 2017-05-11 PROCEDURE — 99214 OFFICE O/P EST MOD 30 MIN: CPT | Performed by: NURSE PRACTITIONER

## 2017-05-11 PROCEDURE — 99283 EMERGENCY DEPT VISIT LOW MDM: CPT

## 2017-05-11 RX ORDER — LOSARTAN POTASSIUM 100 MG/1
100 TABLET ORAL DAILY
Qty: 30 TAB | Refills: 2 | Status: SHIPPED | OUTPATIENT
Start: 2017-05-11 | End: 2017-10-30 | Stop reason: SDUPTHER

## 2017-05-11 ASSESSMENT — LIFESTYLE VARIABLES
HAVE YOU EVER FELT YOU SHOULD CUT DOWN ON YOUR DRINKING: NO
DO YOU DRINK ALCOHOL: NO

## 2017-05-11 ASSESSMENT — PAIN SCALES - GENERAL: PAINLEVEL_OUTOF10: 0

## 2017-05-11 NOTE — ED AVS SNAPSHOT
5/11/2017    Gabriella Clifford  [address]  [facility] [address]    Dear Gabriella:    [facility] wants to ensure your discharge home is safe and you or your loved ones have had all of your questions answered regarding your care after you leave the hospital.    Below is a list of resources and contact information should you have any questions regarding your hospital stay, follow-up instructions, or active medical symptoms.    Questions or Concerns Regarding… Contact   Medical Questions Related to Your Discharge  (7 days a week, 8am-5pm) Contact a Nurse Care Coordinator   [phone]   Medical Questions Not Related to Your Discharge  (24 hours a day / 7 days a week)  Contact the Nurse Health Line   [phone]    Medications or Discharge Instructions Refer to your discharge packet   or contact your [facility] Primary Care Provider   [phone]   Follow-up Appointment(s) Schedule your appointment via flikdate   or contact Scheduling [phone]   Billing Review your statement via flikdate  or contact Billing [phone]   Medical Records Review your records via flikdate   or contact Medical Records [phone]     You may receive a telephone call within two days of discharge. This call is to make certain you understand your discharge instructions and have the opportunity to have any questions answered. You can also easily access your medical information, test results and upcoming appointments via the flikdate free online health management tool. You can learn more and sign up at [email or web address]. For assistance setting up your flikdate account, please call [phone].    Once again, we want to ensure your discharge home is safe and that you have a clear understanding of any next steps in your care. If you have any questions or concerns, please do not hesitate to contact us, we are here for you. Thank you for choosing [facility] for your healthcare needs.    Sincerely,    Your [facility] Healthcare Team

## 2017-05-11 NOTE — DISCHARGE INSTRUCTIONS
Palpitations  A palpitation is the feeling that your heartbeat is irregular or is faster than normal. It may feel like your heart is fluttering or skipping a beat. Palpitations are usually not a serious problem. However, in some cases, you may need further medical evaluation.  CAUSES   Palpitations can be caused by:  · Smoking.  · Caffeine or other stimulants, such as diet pills or energy drinks.  · Alcohol.  · Stress and anxiety.  · Strenuous physical activity.  · Fatigue.  · Certain medicines.  · Heart disease, especially if you have a history of irregular heart rhythms (arrhythmias), such as atrial fibrillation, atrial flutter, or supraventricular tachycardia.  · An improperly working pacemaker or defibrillator.  DIAGNOSIS   To find the cause of your palpitations, your health care provider will take your medical history and perform a physical exam. Your health care provider may also have you take a test called an ambulatory electrocardiogram (ECG). An ECG records your heartbeat patterns over a 24-hour period. You may also have other tests, such as:  1. Transthoracic echocardiogram (TTE). During echocardiography, sound waves are used to evaluate how blood flows through your heart.  2. Transesophageal echocardiogram (JESSENIA).  3. Cardiac monitoring. This allows your health care provider to monitor your heart rate and rhythm in real time.  4. Holter monitor. This is a portable device that records your heartbeat and can help diagnose heart arrhythmias. It allows your health care provider to track your heart activity for several days, if needed.  5. Stress tests by exercise or by giving medicine that makes the heart beat faster.  TREATMENT   Treatment of palpitations depends on the cause of your symptoms and can vary greatly. Most cases of palpitations do not require any treatment other than time, relaxation, and monitoring your symptoms. Other causes, such as atrial fibrillation, atrial flutter, or supraventricular  tachycardia, usually require further treatment.  HOME CARE INSTRUCTIONS   · Avoid:  · Caffeinated coffee, tea, soft drinks, diet pills, and energy drinks.  · Chocolate.  · Alcohol.  · Stop smoking if you smoke.  · Reduce your stress and anxiety. Things that can help you relax include:  · A method of controlling things in your body, such as your heartbeats, with your mind (biofeedback).  · Yoga.  · Meditation.  · Physical activity such as swimming, jogging, or walking.  · Get plenty of rest and sleep.  SEEK MEDICAL CARE IF:   · You continue to have a fast or irregular heartbeat beyond 24 hours.  · Your palpitations occur more often.  SEEK IMMEDIATE MEDICAL CARE IF:  · You have chest pain or shortness of breath.  · You have a severe headache.  · You feel dizzy or you faint.  MAKE SURE YOU:  · Understand these instructions.  · Will watch your condition.  · Will get help right away if you are not doing well or get worse.     This information is not intended to replace advice given to you by your health care provider. Make sure you discuss any questions you have with your health care provider.     Document Released: 12/15/2001 Document Revised: 12/23/2014 Document Reviewed: 02/15/2013  [employer] Interactive Patient Education ©2016 [employer]  Sleep Apnea   Sleep apnea is a sleep disorder characterized by abnormal pauses in breathing while you sleep. When your breathing pauses, the level of oxygen in your blood decreases. This causes you to move out of deep sleep and into light sleep. As a result, your quality of sleep is poor, and the system that carries your blood throughout your body (cardiovascular system) experiences stress. If sleep apnea remains untreated, the following conditions can develop:  · High blood pressure (hypertension).  · Coronary artery disease.  · Inability to achieve or maintain an erection (impotence).  · Impairment of your thought process (cognitive dysfunction).  There are three types of sleep  apnea:  6. Obstructive sleep apnea--Pauses in breathing during sleep because of a blocked airway.  7. Central sleep apnea--Pauses in breathing during sleep because the area of the brain that controls your breathing does not send the correct signals to the muscles that control breathing.  8. Mixed sleep apnea--A combination of both obstructive and central sleep apnea.  RISK FACTORS  The following risk factors can increase your risk of developing sleep apnea:  · Being overweight.  · Smoking.  · Having narrow passages in your nose and throat.  · Being of older age.  · Being male.  · Alcohol use.  · Sedative and tranquilizer use.  · Ethnicity. Among individuals younger than 35 years,  Americans are at increased risk of sleep apnea.  SYMPTOMS   · Difficulty staying asleep.  · Daytime sleepiness and fatigue.  · Loss of energy.  · Irritability.  · Loud, heavy snoring.  · Morning headaches.  · Trouble concentrating.  · Forgetfulness.  · Decreased interest in sex.  · Unexplained sleepiness.  DIAGNOSIS   In order to diagnose sleep apnea, your caregiver will perform a physical examination. A sleep study done in the comfort of your own home may be appropriate if you are otherwise healthy. Your caregiver may also recommend that you spend the night in a sleep lab. In the sleep lab, several monitors record information about your heart, lungs, and brain while you sleep. Your leg and arm movements and blood oxygen level are also recorded.  TREATMENT  The following actions may help to resolve mild sleep apnea:  · Sleeping on your side.    · Using a decongestant if you have nasal congestion.    · Avoiding the use of depressants, including alcohol, sedatives, and narcotics.    · Losing weight and modifying your diet if you are overweight.  There also are devices and treatments to help open your airway:  · Oral appliances. These are custom-made mouthpieces that shift your lower jaw forward and slightly open your bite. This opens  "your airway.  · Devices that create positive airway pressure. This positive pressure \"splints\" your airway open to help you breathe better during sleep. The following devices create positive airway pressure:  ¨ Continuous positive airway pressure (CPAP) device. The CPAP device creates a continuous level of air pressure with an air pump. The air is delivered to your airway through a mask while you sleep. This continuous pressure keeps your airway open.  ¨ Nasal expiratory positive airway pressure (EPAP) device. The EPAP device creates positive air pressure as you exhale. The device consists of single-use valves, which are inserted into each nostril and held in place by adhesive. The valves create very little resistance when you inhale but create much more resistance when you exhale. That increased resistance creates the positive airway pressure. This positive pressure while you exhale keeps your airway open, making it easier to breath when you inhale again.  ¨ Bilevel positive airway pressure (BPAP) device. The BPAP device is used mainly in patients with central sleep apnea. This device is similar to the CPAP device because it also uses an air pump to deliver continuous air pressure through a mask. However, with the BPAP machine, the pressure is set at two different levels. The pressure when you exhale is lower than the pressure when you inhale.  · Surgery. Typically, surgery is only done if you cannot comply with less invasive treatments or if the less invasive treatments do not improve your condition. Surgery involves removing excess tissue in your airway to create a wider passage way.     This information is not intended to replace advice given to you by your health care provider. Make sure you discuss any questions you have with your health care provider.     Document Released: 12/08/2003 Document Revised: 01/08/2016 Document Reviewed: 04/25/2013  [employer] Interactive Patient Education ©2016 [employer]    "

## 2017-05-11 NOTE — ED NOTES
Gabriella Clifford  51 y.o.  Chief Complaint   Patient presents with   • Palpitations     woken from sleep around 0030     Patient ambulatory to triage with above complaint. States that her BP and HR were elevated at that time but have since come down without intervention.

## 2017-05-11 NOTE — MR AVS SNAPSHOT
"        Gabriella Clifford   2017 4:20 PM   Office Visit   MRN: [id number]    Department:  [facility]   Dept Phone:  [phone]    Description:  Female : 1965   Provider:  VALERIE Love           Reason for Visit     Hospital Follow-up BP issues / heart racing      Allergies as of 2017     Allergen Noted Reactions    Erythromycin 2013       Keflex 2013       Pcn [Penicillins] 2013         You were diagnosed with     Essential hypertension   [8954775]       Sleep-disordered breathing   [[id number]]         Vital Signs     Blood Pressure Pulse Temperature Respirations Height Weight    156/92 mmHg 89 36.8 °C (98.2 °F) 16 1.676 m (5' 5.98\") 139.254 kg (307 lb)    Body Mass Index Oxygen Saturation Last Menstrual Period Smoking Status          49.57 kg/m2 98% 2016 Former Smoker        Basic Information     Date Of Birth Sex Race Ethnicity Preferred Language    1965 Female White Non- English      Your appointments     May 15, 2017  2:00 PM   CT BODY WO 30 with [facility] CT 1   [facility])    [address]  [place] [address] [phone]           Taking medications as regularly scheduled is strongly encouraged.            May 23, 2017  1:00 PM   Telemedicine Clinic New Patient with Brenda Louis M.D., TELEMED GYN, TELEMEDICINE [place]   Centralized Scheduling (--)    [address]  [place] [address] [phone]            May 30, 2017 10:40 AM   Established Patient with VALERIE Lopez   [facility] [facility]    [address] [facility] [place] [address] [phone]           You will be receiving a confirmation call a few days before your appointment from our automated call confirmation system.            2017  9:30 AM   New Patient with Afshin Guardado M.D.   [facility] ([facility])    [address]  [place] " [address]   [phone]           Please bring Photo ID, Insurance Cards, All Medication Bottles and copies of any legal documents (such as Living Will, Power of ) If speaking a language besides English please bring an adult . Please arrive 30 minutes prior for check in and registration. You will be receiving a confirmation call a few days before your appointment from our automated call confirmation system.            Aug 03, 2017  1:30 PM   Follow Up Visit with Maylin Willingham M.D.   [facility] (--)    [address], [address]  [place] [id number] [phone]           You will be receiving a confirmation call a few days before your appointment from our automated call confirmation system.              Problem List              ICD-10-CM Priority Class Noted - Resolved    Urticaria, chronic L50.8   1/3/2013 - Present    Hypertension I10   1/3/2013 - Present    Asthma in adult J45.909   1/3/2013 - Present    Shoulder pain M25.519   1/3/2013 - Present    Joint pain M25.50   8/27/2013 - Present    Multiple thyroid nodules E04.2   2/2/2014 - Present    Subclinical hyperthyroidism E05.90   7/31/2014 - Present    Graves disease E05.00   2/12/2015 - Present    Urinary urgency R39.15   9/22/2015 - Present    Burning pain R52   9/22/2015 - Present    Chronic cough R05   12/22/2015 - Present    Morbid obesity with BMI of 45.0-49.9, adult ([facility]) E66.01, Z68.42   3/14/2017 - Present    Health care maintenance Z00.00   4/24/2017 - Present      Health Maintenance        Date Due Completion Dates    MAMMOGRAM 4/30/2015 4/30/2014, 1/17/2013, 1/3/2005 (N/S)    Override on 1/3/2005: (N/S)    COLONOSCOPY 8/9/2016 ---    PAP SMEAR 3/2/2017 3/2/2014 (Prv Comp)    Override on 3/2/2014: Previously completed    IMM DTaP/Tdap/Td Vaccine (2 - Td) 4/24/2027 4/24/2017            Current Immunizations     Influenza TIV (IM) 10/30/2013  7:51 AM    Influenza Vaccine Quad Inj (Pf) 10/15/2014    Pneumococcal  polysaccharide vaccine (PPSV-23) 3/14/2017    Tdap Vaccine 4/24/2017      Below and/or attached are the medications your provider expects you to take. Review all of your home medications and newly ordered medications with your provider and/or pharmacist. Follow medication instructions as directed by your provider and/or pharmacist. Please keep your medication list with you and share with your provider. Update the information when medications are discontinued, doses are changed, or new medications (including over-the-counter products) are added; and carry medication information at all times in the event of emergency situations     Allergies:  ERYTHROMYCIN - (reactions not documented)     KEFLEX - (reactions not documented)     PCN - (reactions not documented)               Medications  Valid as of: May 11, 2017 -  5:08 PM    Generic Name Brand Name Tablet Size Instructions for use    Albuterol Sulfate   Inhale  by mouth.        Azelastine-Fluticasone (Suspension) DYMISTA 137-50 MCG/ACT         Azelastine-Fluticasone (Suspension) Azelastine-Fluticasone 137-50 MCG/ACT Spray 1 Spray in nose 2 Times a Day.        Beclomethasone Dipropionate (Aero Soln) QVAR 80 MCG/ACT Inhale 1 Puff by mouth 2 times a day.          Hydrocodone-Acetaminophen (Tab) NORCO 5-325 MG Take 1 Tab by mouth every 8 hours as needed.        Hydroxychloroquine Sulfate (Tab) PLAQUENIL 200 MG Take 1 Tab by mouth 2 times a day.        Ibuprofen (Tab) MOTRIN 800 MG Take 1 Tab by mouth every 8 hours as needed for Mild Pain.        Losartan Potassium (Tab) COZAAR 100 MG Take 1 Tab by mouth every day.        NIFEdipine (TABLET SR 24 HR) ADALAT CC 60 MG Take 1 Tab by mouth every day.        .                 Medicines prescribed today were sent to:     [id number] - [place], [place] - [address] [facility] [address] [facility] [address]    [address] [place] [address]    Phone: [phone] Fax: [phone]    Open 24 Hours?: No         Medication refill instructions:       If your prescription bottle indicates you have medication refills left, it is not necessary to call your provider’s office. Please contact your pharmacy and they will refill your medication.    If your prescription bottle indicates you do not have any refills left, you may request refills at any time through one of the following ways: The online HackMyPic system (except Urgent Care), by calling your provider’s office, or by asking your pharmacy to contact your provider’s office with a refill request. Medication refills are processed only during regular business hours and may not be available until the next business day. Your provider may request additional information or to have a follow-up visit with you prior to refilling your medication.   *Please Note: Medication refills are assigned a new Rx number when refilled electronically. Your pharmacy may indicate that no refills were authorized even though a new prescription for the same medication is available at the pharmacy. Please request the medicine by name with the pharmacy before contacting your provider for a refill.        Referral     A referral request has been sent to our patient care coordination department. Please allow 3-5 business days for us to process this request and contact you either by phone or mail. If you do not hear from us by the 5th business day, please call us at [phone].        Other Notes About Your Plan     Last UDS:  2/12/15  DR RICCI  Contolled Substance agreement signed:  2/12/15  DR KEIRY Sheriff Access Code: Activation code not generated  Current HackMyPic Status: Active

## 2017-05-11 NOTE — ED NOTES
Pt given d/c instructions/follow up/home care instructions, pt verbalized understanding of POC, pt ambulated to ER lobby, steady gait, with

## 2017-05-11 NOTE — ED PROVIDER NOTES
ED Provider Note    ED Provider Note        CHIEF COMPLAINT  Chief Complaint   Patient presents with   • Palpitations     woken from sleep around 0030       HPI  Gabriella Clifford is a 51 y.o. female who presents to the Emergency Department with chief complaint of palpitations. Patient awoke from sleep at approximately 12:30 AM. Patient reported that time her heart felt as though it is beating out of her chest she took her pulse is approximately 102 she took her blood pressure knows elevated approximately 190/100. Patient currently being managed by primary care physicians for hyperthyroidism she has been off treatment recently and has had normal labs she's also being worked up for rheumatoid arthritis. Patient was told by primary care if she was to have tachycardia that she should seek evaluation. She states that all of her symptoms have resolved at this point she has no chest pain no further palpitations she's had no headaches altered mental status no nausea no headache no diaphoresis. Patient does report history of significant snoring with her spouse confirms. Souse also states that she does have periods where she stops sleeping at night. She has no nausea no vomiting no fevers no cough or congestion no abdominal pain constipation diarrhea dysuria hematuria melena hematochezia skin changes or any other acute concerns.    REVIEW OF SYSTEMS  10 systems reviewed and otherwise negative, pertinent positives and negatives listed in the history of present illness.       PAST MEDICAL HISTORY   has a past medical history of Urticaria, chronic (1/3/2013); Hypertension; Bursitis of shoulder, left; Rotator cuff tear, left; Graves disease; Seasonal allergies; and Rheumatoid arthritis (CMS-[facility]).    SURGICAL HISTORY   has past surgical history that includes rotator cuff repair; carpal tunnel release; tubal coagulation laparoscopic bilateral; shoulder surgery; cubital tunnel release; carpal tunnel release; and other.    SOCIAL  "HISTORY  Social History   Substance Use Topics   • Smoking status: Former Smoker   • Smokeless tobacco: Never Used   • Alcohol Use: 0.0 oz/week     0 Standard drinks or equivalent per week      Comment: Occassionally      History   Drug Use No       FAMILY HISTORY  Non-contributory    CURRENT MEDICATIONS  Home Medications     Reviewed by Kendra Redmond R.N. (Registered Nurse) on 05/11/17 at 0238  Med List Status: Complete    Medication Last Dose Status    Albuterol Sulfate (PROAIR HFA INH) 5/10/2017 Active    Azelastine-Fluticasone (DYMISTA) 137-50 MCG/ACT Suspension not taking Active    beclomethasone (QVAR) 80 MCG/ACT inhaler not taking Active    DYMISTA 137-50 MCG/ACT Suspension not taking Active    hydrocodone-acetaminophen (NORCO) 5-325 MG Tab per tablet not taking Active    hydroxychloroquine (PLAQUENIL) 200 MG Tab not taking Active    ibuprofen (MOTRIN) 800 MG Tab 5/10/2017 Active    losartan (COZAAR) 50 MG Tab 5/10/2017 Active    NIFEdipine (ADALAT CC) 60 MG CR tablet 5/10/2017 Active                ALLERGIES  Allergies   Allergen Reactions   • Erythromycin    • Keflex    • Pcn [Penicillins]        PHYSICAL EXAM  VITAL SIGNS: /73 mmHg  Pulse 76  Temp(Src) 36.8 °C (98.3 °F)  Resp 20  Ht 1.676 m (5' 6\")  Wt 139.9 kg (308 lb 6.8 oz)  BMI 49.80 kg/m2  SpO2 92%  LMP 09/01/2016  Constitutional: Alert and oriented x 3, no acute distress.  HENT: Normocephalic, Atraumatic, Bilateral external ears normal. Nose normal.   Eyes: Pupils are equal and reactive. Conjunctiva normal, non-icteric.   Heart: Regular rate and rythm, no murmurs, equal pulses peripherally x 4.    Lungs: Clear to auscultation bilaterally, no wheezes rales or rhonchi  GI: obese Soft nontender nondistended positive bowel sounds.  Skin: Warm, Dry, No erythema, No rash.   Neurologic: Cranial nerves III through XII grossly intact no sensory deficit no cerebellar dysfunction.   Psychiatric: Appropriate affect for situation    EKG " "interpretation:  Sinus rhythm at a rate of 76. Left axis deviation no ST elevation no ST depression no T-wave inversion no others acute ischemic or rhythmic features.        COURSE & MEDICAL DECISION MAKING  Nursing notes, VS, PMSFHx reviewed in chart.        Medical Decision Making: Very pleasant 51-year-old female asymptomatic at my time of evaluation. EKG shows no acute ischemic or rhythmic features this is well over 3 hours past the onset of her symptoms and the results shortly after this. Patient had just been seen by her primary care physician 1.5 days ago and had basic labs as well as a chest x-ray completed. His reviewed chest x-ray showed some bibasilar atelectasis with no other abnormalities in her labs were fairly unremarkable. Based on the story that the patient gives I feel as though this is extremely suspicious for sleep apnea. She is completely asymptomatic at this time. I offered to perform further cardiac evaluation including cardiac enzymes and possible further imaging however patient does not prefer to complete these at this time. I'm in complete agreement with this as she is completely asymptomatic with a normal EKG if this were thyroid related I would expect her symptoms to have persisted and also have secondary symptoms as well. She has no exertional chest pain no chest pain at all during the entire event no pulse discrepancy her blood pressure has significantly decreased throughout her time in the ER. Patient will follow-up with her primary care physician in the next available time to schedule sleep study she will return for any further palpitations chest pain shortness of breath any other acute concerns otherwise discharged home in stable condition.  /65 mmHg  Pulse 76  Temp(Src) 36.8 °C (98.3 °F)  Resp 20  Ht 1.676 m (5' 6\")  Wt 139.9 kg (308 lb 6.8 oz)  BMI 49.80 kg/m2  SpO2 92%  LMP 09/01/2016      Brenda King, A.P.R.N.  [address] [place] " [id number] [phone]    Schedule an appointment as soon as possible for a visit      [facility], Emergency Dept  [address]  [place] [address] [phone]    immediately if symptoms worsen          FINAL IMPRESSION  1. Palpitations    2. Sleep apnea in adult         This dictation has been created using voice recognition software and/or scribes. The accuracy of the dictation is limited by the abilities of the software and the expertise of the scribes. I expect there may be some errors of grammar and possibly content. I made every attempt to manually correct the errors within my dictation. However, errors related to voice recognition software and/or scribes may still exist and should be interpreted within the appropriate context.

## 2017-05-11 NOTE — ED AVS SNAPSHOT
GetQuik Access Code: Activation code not generated  Current GetQuik Status: Active    Matters[employer]  A secure, online tool to manage your health information     [employer]’s GetQuik® is a secure, online tool that connects you to your personalized health information from the privacy of your home -- day or night - making it very easy for you to manage your healthcare. Once the activation process is completed, you can even access your medical information using the GetQuik marina, which is available for free in the Apple Amrina store or Google Play store.     GetQuik provides the following levels of access (as shown below):   My Chart Features   [facility] Primary Care Doctor [facility]  Specialists [facility]  Urgent  Care Non-[facility]  Primary Care  Doctor   Email your healthcare team securely and privately 24/7 X X X X   Manage appointments: schedule your next appointment; view details of past/upcoming appointments X      Request prescription refills. X      View recent personal medical records, including lab and immunizations X X X X   View health record, including health history, allergies, medications X X X X   Read reports about your outpatient visits, procedures, consult and ER notes X X X X   See your discharge summary, which is a recap of your hospital and/or ER visit that includes your diagnosis, lab results, and care plan. X X       How to register for GetQuik:  1. Go to  [email or web address].  2. Click on the Sign Up Now box, which takes you to the New Member Sign Up page. You will need to provide the following information:  a. Enter your GetQuik Access Code exactly as it appears at the top of this page. (You will not need to use this code after you’ve completed the sign-up process. If you do not sign up before the expiration date, you must request a new code.)   b. Enter your date of birth.   c. Enter your home email address.   d. Click Submit, and follow the next screen’s instructions.  3. Create a GetQuik ID. This will  be your Getable login ID and cannot be changed, so think of one that is secure and easy to remember.  4. Create a Getable password. You can change your password at any time.  5. Enter your Password Reset Question and Answer. This can be used at a later time if you forget your password.   6. Enter your e-mail address. This allows you to receive e-mail notifications when new information is available in Getable.  7. Click Sign Up. You can now view your health information.    For assistance activating your Getable account, call [phone]

## 2017-05-12 NOTE — ASSESSMENT & PLAN NOTE
This is a 51-year-old female with Graves' disease, hypertension, morbid obesity here for follow-up of recent ER visit this morning for sudden onset of racing heart in the early morning, she did check her HR which was in the 120's, this was only a brief episode. She does have Graves' disease and was told by her primary care provider to go to the urgent care or ER for tachycardia.    She tells me that she feels well. She continues to have high blood pressure, her blood pressures elevated at 156/92 today, all other vitals are normal. She was seen by her rheumatologist on 5/8/17 and her blood pressure was elevated at 174/98. She is having some mild dizziness, no syncope, no falls. She has had no recurrence of racing heart, no chest pain, no headaches, altered mental status, no nausea, no headache, no diaphoresis, SOB, syncope. She continues with rheumatology for work up of RA.  She is morbidly obese, her 's reports significant snoring as well as apneic episodes nightly. Her EKG was normal. She has had normal CMP, last TSH and T4 were normal in January and she has remained off of her hyperthyroidism treatments, she will follow up with endocrinology next month. She has had no heat intolerance, diaphoresis, tremor, weight loss, diarrhea. She had no additional testing in the ER as she was asymptomatic and stable.

## 2017-05-12 NOTE — PROGRESS NOTES
[facility]  Primary Care Office Visit - Problem-Oriented        History:     Gabriella Clifford is a 51 y.o. female who is here today to discuss Hospital Follow-up      Hypertension  This is a 51-year-old female with Graves' disease, hypertension, morbid obesity here for follow-up of recent ER visit this morning for sudden onset of racing heart in the early morning, she did check her HR which was in the 120's, this was only a brief episode. She does have Graves' disease and was told by her primary care provider to go to the urgent care or ER for tachycardia.    She tells me that she feels well. She continues to have high blood pressure, her blood pressures elevated at 156/92 today, all other vitals are normal. She was seen by her rheumatologist on 5/8/17 and her blood pressure was elevated at 174/98. She is having some mild dizziness, no syncope, no falls. She has had no recurrence of racing heart, no chest pain, no headaches, altered mental status, no nausea, no headache, no diaphoresis, SOB, syncope. She continues with rheumatology for work up of RA.  She is morbidly obese, her 's reports significant snoring as well as apneic episodes nightly. Her EKG was normal. She has had normal CMP, last TSH and T4 were normal in January and she has remained off of her hyperthyroidism treatments, she will follow up with endocrinology next month. She has had no heat intolerance, diaphoresis, tremor, weight loss, diarrhea. She had no additional testing in the ER as she was asymptomatic and stable.           Past Medical History   Diagnosis Date   • Urticaria, chronic 1/3/2013   • Hypertension    • Bursitis of shoulder, left    • Rotator cuff tear, left    • Graves disease    • Seasonal allergies    • Rheumatoid arthritis (CMS-[facility])      Past Surgical History   Procedure Laterality Date   • Rotator cuff repair       left   • Carpal tunnel release     • Tubal coagulation laparoscopic bilateral     • Shoulder surgery      • Cubital tunnel release     • Carpal tunnel release     • Other       jaw surgery     Social History     Social History   • Marital Status:      Spouse Name: N/A   • Number of Children: N/A   • Years of Education: N/A     Occupational History   • Not on file.     Social History Main Topics   • Smoking status: Former Smoker   • Smokeless tobacco: Never Used   • Alcohol Use: 0.0 oz/week     0 Standard drinks or equivalent per week      Comment: Occassionally   • Drug Use: No   • Sexual Activity: Yes     Other Topics Concern   • Not on file     Social History Narrative     History   Smoking status   • Former Smoker   Smokeless tobacco   • Never Used     Family History   Problem Relation Age of Onset   • Arthritis Mother      RA   • Cancer Mother      breast   • Hypertension Father    • Cancer Maternal Grandmother      Allergies   Allergen Reactions   • Erythromycin    • Keflex    • Pcn [Penicillins]        Problem List:     Patient Active Problem List    Diagnosis Date Noted   • Health care maintenance 04/24/2017   • Morbid obesity with BMI of 45.0-49.9, adult ([facility]) 03/14/2017   • Chronic cough 12/22/2015   • Urinary urgency 09/22/2015   • Burning pain 09/22/2015   • Graves disease 02/12/2015   • Subclinical hyperthyroidism 07/31/2014   • Multiple thyroid nodules 02/02/2014   • Joint pain 08/27/2013   • Urticaria, chronic 01/03/2013   • Hypertension 01/03/2013   • Asthma in adult 01/03/2013   • Shoulder pain 01/03/2013         Medications:     Current outpatient prescriptions:   •  losartan (COZAAR) 100 MG Tab, Take 1 Tab by mouth every day., Disp: 30 Tab, Rfl: 2  •  ibuprofen (MOTRIN) 800 MG Tab, Take 1 Tab by mouth every 8 hours as needed for Mild Pain., Disp: 90 Tab, Rfl: 3  •  NIFEdipine (ADALAT CC) 60 MG CR tablet, Take 1 Tab by mouth every day., Disp: 90 Tab, Rfl: 3  •  hydroxychloroquine (PLAQUENIL) 200 MG Tab, Take 1 Tab by mouth 2 times a day., Disp: 60 Tab, Rfl: 1  •  hydrocodone-acetaminophen  "(NORCO) 5-325 MG Tab per tablet, Take 1 Tab by mouth every 8 hours as needed., Disp: 60 Tab, Rfl: 0  •  Azelastine-Fluticasone (DYMISTA) 137-50 MCG/ACT Suspension, Spray 1 Spray in nose 2 Times a Day., Disp: 1 Bottle, Rfl: 0  •  DYMISTA 137-50 MCG/ACT Suspension, , Disp: , Rfl: 6  •  Albuterol Sulfate (PROAIR HFA INH), Inhale  by mouth., Disp: , Rfl:   •  beclomethasone (QVAR) 80 MCG/ACT inhaler, Inhale 1 Puff by mouth 2 times a day.  , Disp: , Rfl:       Review of Systems:     Positives per HPI, all other systems reviewed and WNL       Physical Assessment:     VS: /92 mmHg  Pulse 89  Temp(Src) 36.8 °C (98.2 °F)  Resp 16  Ht 1.676 m (5' 5.98\")  Wt 139.254 kg (307 lb)  BMI 49.57 kg/m2  SpO2 98%  LMP 09/11/2016    General: Well-developed, well-nourished, female, morbidly obese     Head: PERRL, EOMI. Normocephalic. No facial asymmetry noted.  Cardiovasc:No JVD.  RRR, no MRG. No thrills or bruits. Pulses 2+ and symmetric at all distal extremities.  Pulmonary: Lungs clear bilaterally.  Normal respiratory effort. No wheeze or crackles.   Extremities: No edema, no TTP bilateral calves. Pedal pulses intact. No joint effusions. LEs warm and well-perfused.  Neuro: Alert and oriented  Skin:No rashes noted. Skin warm, dry, intact    Psych: Dressed appropriately for the weather, pleasant and conversant.  Affect, mood & judgment appropriate.      Assessment/Plan:   Gabriella was seen today for hospital follow-up.    Diagnoses and all orders for this visit:    Tachycardia, resolved  - Reassurance, reviewed diagnostic studies & ER notes with the patient. I agree with the ER work up, if this was related to her Graves' disease I would expect her to have sustained tachycardia as well as secondary symptoms. EKG was normal. Vitals are stable today and she remains asymptomatic. Her history does support obstructive sleep apnea which may very well be contributing to uncontrolled hypertension. We will move forward with a sleep " study. ER precautions were discussed, she verbalizes agreement. We will maximize her with certain asked that she follow up in 2 weeks for blood pressure check/follow-up.    Essential hypertension, ongoing, uncontrolled   - Increase losartan to 100 mg daily, follow-up in 2 weeks for blood pressure check/follow-up.  -     losartan (COZAAR) 100 MG Tab; Take 1 Tab by mouth every day.    Sleep-disordered breathing, ongoing, uncontrolled  -   -     REFERRAL TO SLEEP STUDIES      Patient is agreeable to the above plan and voiced understanding. All questions answered.     Please note that this dictation was created using voice recognition software. I have made every reasonable attempt to correct obvious errors, but I expect that there are errors of grammar and possibly content that I did not discover before finalizing the note.      DAVID Billingsley  5/11/2017, 5:23 PM

## 2017-05-15 ENCOUNTER — HOSPITAL ENCOUNTER (OUTPATIENT)
Dept: RADIOLOGY | Facility: MEDICAL CENTER | Age: 52
End: 2017-05-15
Attending: INTERNAL MEDICINE
Payer: COMMERCIAL

## 2017-05-15 DIAGNOSIS — J98.11 MILD BIBASILAR ATELECTASIS: ICD-10-CM

## 2017-05-15 PROCEDURE — 71250 CT THORAX DX C-: CPT

## 2017-05-23 ENCOUNTER — TELEPHONE (OUTPATIENT)
Dept: MEDICAL GROUP | Facility: PHYSICIAN GROUP | Age: 52
End: 2017-05-23

## 2017-05-23 ENCOUNTER — TELEMEDICINE2 (OUTPATIENT)
Dept: SCHEDULING | Facility: IMAGING CENTER | Age: 52
End: 2017-05-23
Payer: COMMERCIAL

## 2017-05-23 VITALS
BODY MASS INDEX: 47.09 KG/M2 | SYSTOLIC BLOOD PRESSURE: 118 MMHG | DIASTOLIC BLOOD PRESSURE: 80 MMHG | HEIGHT: 66 IN | WEIGHT: 293 LBS

## 2017-05-23 DIAGNOSIS — N39.46 URINARY INCONTINENCE, MIXED: ICD-10-CM

## 2017-05-23 DIAGNOSIS — R73.09 ELEVATED RANDOM BLOOD GLUCOSE LEVEL: ICD-10-CM

## 2017-05-23 PROCEDURE — 99203 OFFICE O/P NEW LOW 30 MIN: CPT | Mod: GT | Performed by: OBSTETRICS & GYNECOLOGY

## 2017-05-23 RX ORDER — TOLTERODINE 2 MG/1
2 CAPSULE, EXTENDED RELEASE ORAL DAILY
Qty: 30 CAP | Refills: 11 | Status: SHIPPED | OUTPATIENT
Start: 2017-05-23 | End: 2018-06-13 | Stop reason: SDUPTHER

## 2017-05-23 NOTE — PROGRESS NOTES
Chief Complaint   Patient presents with   • Other     urinary urgency       History of present illness: 51 y.o. presents with above chief complaint. Location:bladder; duration:6 months; quality: frequency of urine at night; severity: moderate,  Timing: have to get up several times at night daily; aggravating factors: drinks water before going to bed and when she wakes up because she is thirsty, alleviating factors:pessary. Pt also has other complaints of losing urine with coughing and certain activities. Has a pessary in place for last 4 years which has helped but does not completely take it away. Pt is on multiple medications for HTN and possible arthritis. She denies having any glaucoma or eye issues. Denies being diagnosed with diabetes but may also have sleep apnea and will be worked up for that.    Review of systems:  Pertinent positives documented in HPI and a comprehensive review of system is negative as follows:    Constitutional ROS: No unexpected change in weight, No weakness, No fatigue, No unexplained fevers, sweats, or chills  Mouth/Throat ROS: No bleeding gums, No sore throat, No recent change in voice or hoarseness  Neck ROS: No lumps or masses, No swollen glands, No significant pain in neck  Pulmonary ROS: No chronic cough, sputum, or hemoptysis, No dyspnea on exertion, No wheezing, No shortness of breath, No recent change in breathing  Cardiovascular ROS: No exercise intolerance, No chest pain, No shortness of breath, No palpitations, No syncope  Genitourinary ROS: Negative for dysuria, frequency and incontinence  Gastrointestinal ROS: No abdominal pain, No change in bowel habits, No significant change in appetite, No nausea, vomiting, diarrhea, or constipation, No hematemesis, No abdominal bloating or early satiety  Breast ROS: No new breast lumps or masses, No severe breast pain, No nipple discharge  Musculoskeletal/Extremities ROS: No cyanosis, No peripheral edema, No pain, redness or swelling on  "the joints  Hematologic/Lymphatic ROS: No anemia, No abnormal bleeding, No chills, No bruising, No weight loss  Skin/Integumentary ROS: No evidence of bleeding or bruising, No abnormal skin lesions noted, No evidence of rash, No itching  Neurologic ROS: No chronic headaches, No seizures, No weakness  Psychiatric ROS: No depression, No anxiety, No psychosis    All PMH, PSH, allergies, social history and FH reviewed and updated today:  Past Medical History   Diagnosis Date   • Urticaria, chronic 1/3/2013   • Hypertension    • Bursitis of shoulder, left    • Rotator cuff tear, left    • Graves disease    • Seasonal allergies    • Rheumatoid arthritis (CMS-[facility])        Past Surgical History   Procedure Laterality Date   • Rotator cuff repair       left   • Carpal tunnel release     • Tubal coagulation laparoscopic bilateral     • Shoulder surgery     • Cubital tunnel release     • Carpal tunnel release     • Other       jaw surgery       Allergies:   Allergies   Allergen Reactions   • Erythromycin    • Keflex    • Pcn [Penicillins]        Social History     Social History   • Marital Status:      Spouse Name: N/A   • Number of Children: N/A   • Years of Education: N/A     Occupational History   • Not on file.     Social History Main Topics   • Smoking status: Former Smoker   • Smokeless tobacco: Never Used   • Alcohol Use: 0.0 oz/week     0 Standard drinks or equivalent per week      Comment: Occassionally   • Drug Use: No   • Sexual Activity: Yes     Other Topics Concern   • Not on file     Social History Narrative       Family History   Problem Relation Age of Onset   • Arthritis Mother      RA   • Cancer Mother      breast   • Hypertension Father    • Cancer Maternal Grandmother        Physical exam:  Blood pressure 118/80, height 1.676 m (5' 6\"), weight 140.615 kg (310 lb), last menstrual period 09/11/2016, not currently breastfeeding.    GENERAL APPEARANCE: healthy, alert, no distress, cooperative, " obese.  TELEMEDICINE APPT.  NEURO Awake, alert and oriented x 3, Normal gait, no sensory deficits  SKIN No rashes, or ulcers or lesions seen  PSYCHIATRIC: Patient shows appropriate affect, is alert and oriented x3, intact judgment and insight.      1. Urinary incontinence, mixed  tolterodine ER (DETROL-LA) 2 MG CAPSULE SR 24 HR     Counseling/coordination of care of mixed urinary incontinence including medical and surgical options of care. Pt needs to change her lifestyle with no drinking of fluids at least 2 hours before going to bed. She needs to empty her bladder before going to bed. Discussed signs and symptoms of UTI so if she should have symptoms, she needs to see primary care in [place] to address. Will start Detrol LA 2 mg daily to see if helpful for symptoms. Normal side effects of dry mouth discussed and risks and benefits of medication addressed. RX sent electronically. In review of her history and labs performed, glucose was 155 but not fasting. Recommend testing fasting blood glucose so she is ruled out for diabetes. Also could be sleep apnea that is causing her to wake up so proceed with studies for sleep apnea. Call office as needed.

## 2017-05-23 NOTE — MR AVS SNAPSHOT
"        Gabriella Clifford   2017 1:00 PM   Telemedicine2   MRN: [id number]    Department:  [facility]   Dept Phone:  [phone]    Description:  Female : 1965   Provider:  Brenda Louis M.D.; TELEMEDICINE [place]; TELEMED GYN           Reason for Visit     Other urinary urgency      Allergies as of 2017     Allergen Noted Reactions    Erythromycin 2013       Keflex 2013       Pcn [Penicillins] 2013         You were diagnosed with     Urinary incontinence, mixed   [[id number]]         Vital Signs     Blood Pressure Height Weight Body Mass Index Last Menstrual Period Breastfeeding?    118/80 mmHg 1.676 m (5' 6\") 140.615 kg (310 lb) 50.06 kg/m2 2016 No    Smoking Status                   Former Smoker           Basic Information     Date Of Birth Sex Race Ethnicity Preferred Language    1965 Female White Non- English      Your appointments     May 30, 2017 10:40 AM   Established Patient with VALERIE Lopez   [facility]    [address] [place] [id number] [phone]           You will be receiving a confirmation call a few days before your appointment from our automated call confirmation system.            2017  9:30 AM   New Patient with Afshin Guardado M.D.   [facility]    [address], [address]  [facility] [address] [phone]           Please bring Photo ID, Insurance Cards, All Medication Bottles and copies of any legal documents (such as Living Will, Power of ) If speaking a language besides English please bring an adult . Please arrive 30 minutes prior for check in and registration. You will be receiving a confirmation call a few days before your appointment from our automated call confirmation system.            2017  1:00 PM   New Patient with YOLI Colunga   [facility] (--)    [address]  [place] [id number] [phone]           Please bring enclosed paperwork completed along with your insurance card and photo ID.            Aug 03, 2017  1:30 PM   Follow Up Visit with Maylin Willingham M.D.   [facility] (--)    [address] , [address]  [place] [id number] [phone]           You will be receiving a confirmation call a few days before your appointment from our automated call confirmation system.              Problem List              ICD-10-CM Priority Class Noted - Resolved    Urticaria, chronic L50.8   1/3/2013 - Present    Hypertension I10   1/3/2013 - Present    Asthma in adult J45.909   1/3/2013 - Present    Shoulder pain M25.519   1/3/2013 - Present    Joint pain M25.50   8/27/2013 - Present    Multiple thyroid nodules E04.2   2/2/2014 - Present    Subclinical hyperthyroidism E05.90   7/31/2014 - Present    Graves disease E05.00   2/12/2015 - Present    Urinary urgency R39.15   9/22/2015 - Present    Burning pain R52   9/22/2015 - Present    Chronic cough R05   12/22/2015 - Present    Morbid obesity with BMI of 45.0-49.9, adult (HCC) E66.01, Z68.42   3/14/2017 - Present    Health care maintenance Z00.00   4/24/2017 - Present      Health Maintenance        Date Due Completion Dates    MAMMOGRAM 4/30/2015 4/30/2014, 1/17/2013, 1/3/2005 (N/S)    Override on 1/3/2005: (N/S)    COLONOSCOPY 8/9/2016 ---    PAP SMEAR 3/2/2017 3/2/2014 (Prv Comp)    Override on 3/2/2014: Previously completed    IMM DTaP/Tdap/Td Vaccine (2 - Td) 4/24/2027 4/24/2017            Current Immunizations     Influenza TIV (IM) 10/30/2013  7:51 AM    Influenza Vaccine Quad Inj (Pf) 10/15/2014    Pneumococcal polysaccharide vaccine (PPSV-23) 3/14/2017    Tdap Vaccine 4/24/2017      Below and/or attached are the medications your provider expects you to take. Review all of your home medications and newly ordered medications with your provider and/or pharmacist. Follow medication instructions  as directed by your provider and/or pharmacist. Please keep your medication list with you and share with your provider. Update the information when medications are discontinued, doses are changed, or new medications (including over-the-counter products) are added; and carry medication information at all times in the event of emergency situations     Allergies:  ERYTHROMYCIN - (reactions not documented)     KEFLEX - (reactions not documented)     PCN - (reactions not documented)               Medications  Valid as of: May 23, 2017 -  1:54 PM    Generic Name Brand Name Tablet Size Instructions for use    Albuterol Sulfate   Inhale  by mouth.        Azelastine-Fluticasone (Suspension) DYMISTA 137-50 MCG/ACT         Azelastine-Fluticasone (Suspension) Azelastine-Fluticasone 137-50 MCG/ACT Spray 1 Spray in nose 2 Times a Day.        Beclomethasone Dipropionate (Aero Soln) QVAR 80 MCG/ACT Inhale 1 Puff by mouth 2 times a day.          Hydrocodone-Acetaminophen (Tab) NORCO 5-325 MG Take 1 Tab by mouth every 8 hours as needed.        Hydroxychloroquine Sulfate (Tab) PLAQUENIL 200 MG Take 1 Tab by mouth 2 times a day.        Ibuprofen (Tab) MOTRIN 800 MG Take 1 Tab by mouth every 8 hours as needed for Mild Pain.        Losartan Potassium (Tab) COZAAR 100 MG Take 1 Tab by mouth every day.        NIFEdipine (TABLET SR 24 HR) ADALAT CC 60 MG Take 1 Tab by mouth every day.        Tolterodine Tartrate (CAPSULE SR 24 HR) DETROL-LA 2 MG Take 1 Cap by mouth every day.        .                 Medicines prescribed today were sent to:     [facility] [id number] [place], [place] [address], [place] - [address] [place] [address] & [place]    [address] [facility] [address]    Phone: [phone] Fax: [phone]    Open 24 Hours?: No      Medication refill instructions:       If your prescription bottle indicates you have medication refills left, it is not necessary to call your provider’s office. Please contact your pharmacy  and they will refill your medication.    If your prescription bottle indicates you do not have any refills left, you may request refills at any time through one of the following ways: The online [employer] system (except Urgent Care), by calling your provider’s office, or by asking your pharmacy to contact your provider’s office with a refill request. Medication refills are processed only during regular business hours and may not be available until the next business day. Your provider may request additional information or to have a follow-up visit with you prior to refilling your medication.   *Please Note: Medication refills are assigned a new Rx number when refilled electronically. Your pharmacy may indicate that no refills were authorized even though a new prescription for the same medication is available at the pharmacy. Please request the medicine by name with the pharmacy before contacting your provider for a refill.        Instructions    Start medication tonight       Other Notes About Your Plan     Last UDS:  2/12/15  DR RICCI  Contolled Substance agreement signed:  2/12/15  DR KEIRY Sheriff Access Code: Activation code not generated  Current [employer] Status: Active

## 2017-05-23 NOTE — TELEPHONE ENCOUNTER
Patient saw Dr. Louis via telemed today. She recommends that the patient have a repeat CMP. The last one she had was not fasting so her glucose level was elevated.

## 2017-05-26 ENCOUNTER — HOSPITAL ENCOUNTER (OUTPATIENT)
Dept: LAB | Facility: MEDICAL CENTER | Age: 52
End: 2017-05-26
Attending: NURSE PRACTITIONER
Payer: COMMERCIAL

## 2017-05-26 DIAGNOSIS — R73.09 ELEVATED RANDOM BLOOD GLUCOSE LEVEL: ICD-10-CM

## 2017-05-26 LAB
ALBUMIN SERPL BCP-MCNC: 3.7 G/DL (ref 3.2–4.9)
ALBUMIN/GLOB SERPL: 1.1 G/DL
ALP SERPL-CCNC: 42 U/L (ref 30–99)
ALT SERPL-CCNC: 20 U/L (ref 2–50)
ANION GAP SERPL CALC-SCNC: 6 MMOL/L (ref 0–11.9)
AST SERPL-CCNC: 14 U/L (ref 12–45)
BILIRUB SERPL-MCNC: 0.5 MG/DL (ref 0.1–1.5)
BUN SERPL-MCNC: 23 MG/DL (ref 8–22)
CALCIUM SERPL-MCNC: 9 MG/DL (ref 8.5–10.5)
CHLORIDE SERPL-SCNC: 107 MMOL/L (ref 96–112)
CO2 SERPL-SCNC: 24 MMOL/L (ref 20–33)
CREAT SERPL-MCNC: 0.65 MG/DL (ref 0.5–1.4)
GFR SERPL CREATININE-BSD FRML MDRD: >60 ML/MIN/1.73 M 2
GLOBULIN SER CALC-MCNC: 3.3 G/DL (ref 1.9–3.5)
GLUCOSE SERPL-MCNC: 123 MG/DL (ref 65–99)
POTASSIUM SERPL-SCNC: 4.1 MMOL/L (ref 3.6–5.5)
PROT SERPL-MCNC: 7 G/DL (ref 6–8.2)
SODIUM SERPL-SCNC: 137 MMOL/L (ref 135–145)

## 2017-05-26 PROCEDURE — 80053 COMPREHEN METABOLIC PANEL: CPT

## 2017-05-26 PROCEDURE — 36415 COLL VENOUS BLD VENIPUNCTURE: CPT

## 2017-05-30 ENCOUNTER — OFFICE VISIT (OUTPATIENT)
Dept: MEDICAL GROUP | Facility: PHYSICIAN GROUP | Age: 52
End: 2017-05-30
Payer: COMMERCIAL

## 2017-05-30 VITALS
WEIGHT: 293 LBS | OXYGEN SATURATION: 93 % | RESPIRATION RATE: 16 BRPM | BODY MASS INDEX: 47.09 KG/M2 | HEART RATE: 77 BPM | HEIGHT: 66 IN | DIASTOLIC BLOOD PRESSURE: 84 MMHG | SYSTOLIC BLOOD PRESSURE: 122 MMHG | TEMPERATURE: 98.2 F

## 2017-05-30 DIAGNOSIS — M25.541 ARTHRALGIA OF BOTH HANDS: ICD-10-CM

## 2017-05-30 DIAGNOSIS — Z00.00 HEALTH CARE MAINTENANCE: ICD-10-CM

## 2017-05-30 DIAGNOSIS — E04.2 MULTIPLE THYROID NODULES: ICD-10-CM

## 2017-05-30 DIAGNOSIS — M25.542 ARTHRALGIA OF BOTH HANDS: ICD-10-CM

## 2017-05-30 DIAGNOSIS — E05.00 GRAVES DISEASE: ICD-10-CM

## 2017-05-30 DIAGNOSIS — E05.90 SUBCLINICAL HYPERTHYROIDISM: ICD-10-CM

## 2017-05-30 DIAGNOSIS — I10 ESSENTIAL HYPERTENSION: ICD-10-CM

## 2017-05-30 PROCEDURE — 99214 OFFICE O/P EST MOD 30 MIN: CPT | Performed by: NURSE PRACTITIONER

## 2017-05-30 ASSESSMENT — PATIENT HEALTH QUESTIONNAIRE - PHQ9: CLINICAL INTERPRETATION OF PHQ2 SCORE: 0

## 2017-05-30 NOTE — ASSESSMENT & PLAN NOTE
Patient is past due for a number of health care maintenance exams. Her last visit with previous PCP, she was given referral to gastroenterology for screening colonoscopy, she was also given an order for mammogram. She has not done these exams yet. She is up-to-date with labs however. Immunizations are up-to-date. She states she sees gynecology for her Pap smears, and states she will be make an appointment with that provider.

## 2017-05-30 NOTE — PROGRESS NOTES
Chief Complaint   Patient presents with   • Blood Pressure Problem         This is a 51 y.o.female patient that presents today with the following: Follow-up visit    Graves disease  This is a chronic condition, she has upcoming appointment with new endocrinologist next week on 7 June. She does not take medication for this at this time. However, she does wonder if recent issues with blood pressure may have something to do with her thyroid (see additional notes on HTN). The last time her thyroid was checked, it was January 2017 and results were within normal limits.  I did discuss with her that is certainly possible, but today her blood pressure was within normal limits, 122/84. I encouraged her to keep her upcoming appointment with endocrinology next week. It is likely new provider will do workup including labs.    Health care maintenance  Patient is past due for a number of health care maintenance exams. Her last visit with previous PCP, she was given referral to gastroenterology for screening colonoscopy, she was also given an order for mammogram. She has not done these exams yet. She is up-to-date with labs however. Immunizations are up-to-date. She states she sees gynecology for her Pap smears, and states she will be make an appointment with that provider.    Hypertension  Patient continues to report issues with her blood pressure. She was seen on May 8 by her rheumatologist in which her blood pressure was 174/98. She has been symptomatic with this including mild dizziness, but she does not have chest pain or shortness of breath, nor does she have syncope. She woke in the middle of the night between May 10 May 11 with racing heart and went to the emergency room in which she had negative workup. She was recommended to have a sleep study, as  reports she snores and often stops breathing in the middle of the night. She was seen in follow-up by one of the other providers here in the [facility] on 11 May,  which her blood pressure was 156/92.  Today her blood pressure is 122/84. She was also seen via telemedicine on May 23rd by urology, blood pressure at that time was also within normal limits at 118/80. Today she continues to be asymptomatic. I discussed with her, at this point we will continue to monitor her blood pressure and have her stay her current dose of medications. I encouraged her to keep her upcoming appointment with endocrinology and other specialist. I would like her to continue to take her blood pressure daily. I shortly encouraged her to keep her upcoming appointment with sleep medicine.    Joint pain  This is chronic in nature, she has been followed by rheumatology and is currently being worked up. She is to start Plaquenil soon, this will be after her eye exam this week.      Hospital Outpatient Visit on 2017   Component Date Value   • Sodium 2017 137    • Potassium 2017 4.1    • Chloride 2017 107    • Co2 2017 24    • Anion Gap 2017 6.0    • Glucose 2017 123*   • Bun 2017 23*   • Creatinine 2017 0.65    • Calcium 2017 9.0    • AST(SGOT) 2017 14    • ALT(SGPT) 2017 20    • Alkaline Phosphatase 2017 42    • Total Bilirubin 2017 0.5    • Albumin 2017 3.7    • Total Protein 2017 7.0    • Globulin 2017 3.3    • A-G Ratio 2017 1.1    • GFR If  2017 >60    • GFR If Non  Ameri* 2017 >60    Admission on 2017, Discharged on 2017   Component Date Value   • Report 2017                      Value:[facility]    Test Date:  2017  Pt Name:    AIRAM WATSON              Department: ER  MRN:        [id number]                      Room:  Gender:     F                            Technician: [id number]  :        1965                   Requested By:ER TRIAGE PROTOCOL  Order #:    [id number]                    Reading  MD:    Measurements  Intervals                                Axis  Rate:       76                           P:          65  PA:         192                          QRS:        -35  QRSD:       86                           T:          14  QT:         372  QTc:        419    Interpretive Statements  SINUS RHYTHM  PROBABLE LEFT ATRIAL ABNORMALITY  LEFT AXIS DEVIATION  LEFT VENTRICULAR HYPERTROPHY  No previous ECG available for comparison     • Report 2017                      Value:[facility]    Test Date:  2017  Pt Name:    AIRAM WATSON              Department: ER  MRN:        [id number]                      Room:       [facility]  Gender:     F                            Technician: [id number]  :        1965                   Requested By:ER TRIAGE PROTOCOL  Order #:    [id number]                    Reading MD:    Measurements  Intervals                                Axis  Rate:       83                           P:          71  PA:         192                          QRS:        -35  QRSD:       82                           T:          7  QT:         356  QTc:        419    Interpretive Statements  SINUS RHYTHM  LEFT AXIS DEVIATION  LEFT VENTRICULAR HYPERTROPHY  CONSIDER ANTERIOR INFARCT  No previous ECG available for comparison     Hospital Outpatient Visit on 2017   Component Date Value   • WBC 2017 11.0*   • RBC 2017 4.77    • Hemoglobin 2017 14.3    • Hematocrit 2017 42.7    • MCV 2017 89.5    • MCH 2017 30.0    • MCHC 2017 33.5*   • RDW 2017 44.2    • Platelet Count 2017 326    • MPV 2017 9.5    • Neutrophils-Polys 2017 60.20    • Lymphocytes 2017 30.40    • Monocytes 2017 6.00    • Eosinophils 2017 2.50    • Basophils 2017 0.50    • Immature Granulocytes 2017 0.40    • Nucleated RBC 2017 0.00    • Neutrophils (Absolute) 2017 6.64    • Lymphs  (Absolute) 05/08/2017 3.35    • Monos (Absolute) 05/08/2017 0.66    • Eos (Absolute) 05/08/2017 0.28    • Baso (Absolute) 05/08/2017 0.05    • Immature Granulocytes (a* 05/08/2017 0.04    • NRBC (Absolute) 05/08/2017 0.00    • 25-Hydroxy   Vitamin D 25 05/08/2017 26*   • Vitamin B12 -True Cobala* 05/08/2017 542    • Folate -Folic Acid 05/08/2017 7.9              Past Medical History   Diagnosis Date   • Urticaria, chronic 1/3/2013   • Hypertension    • Bursitis of shoulder, left    • Rotator cuff tear, left    • Graves disease    • Seasonal allergies    • Rheumatoid arthritis (CMS-HCC)        Past Surgical History   Procedure Laterality Date   • Rotator cuff repair       left   • Carpal tunnel release     • Tubal coagulation laparoscopic bilateral     • Shoulder surgery     • Cubital tunnel release     • Carpal tunnel release     • Other       jaw surgery       Family History   Problem Relation Age of Onset   • Arthritis Mother      RA   • Cancer Mother      breast   • Hypertension Father    • Cancer Maternal Grandmother        Erythromycin; Keflex; and Pcn    Current Outpatient Prescriptions Ordered in [facility]   Medication Sig Dispense Refill   • tolterodine ER (DETROL-LA) 2 MG CAPSULE SR 24 HR Take 1 Cap by mouth every day. 30 Cap 11   • losartan (COZAAR) 100 MG Tab Take 1 Tab by mouth every day. 30 Tab 2   • NIFEdipine (ADALAT CC) 60 MG CR tablet Take 1 Tab by mouth every day. 90 Tab 3   • Albuterol Sulfate (PROAIR HFA INH) Inhale  by mouth.     • hydroxychloroquine (PLAQUENIL) 200 MG Tab Take 1 Tab by mouth 2 times a day. 60 Tab 1   • ibuprofen (MOTRIN) 800 MG Tab Take 1 Tab by mouth every 8 hours as needed for Mild Pain. 90 Tab 3   • hydrocodone-acetaminophen (NORCO) 5-325 MG Tab per tablet Take 1 Tab by mouth every 8 hours as needed. 60 Tab 0   • Azelastine-Fluticasone (DYMISTA) 137-50 MCG/ACT Suspension Spray 1 Spray in nose 2 Times a Day. 1 Bottle 0   • DYMISTA 137-50 MCG/ACT Suspension   6   • beclomethasone  "(QVAR) 80 MCG/ACT inhaler Inhale 1 Puff by mouth 2 times a day.         No current Epic-ordered facility-administered medications on file.       Constitutional ROS: No unexpected change in weight, No weakness, No unexplained fevers, sweats, or chills  Pulmonary ROS: No chronic cough, sputum, or hemoptysis, No shortness of breath, No recent change in breathing, Positive for asthma, controlled  Cardiovascular ROS: No chest pain, No edema, No palpitations, Positive for hypertension, per history of present illness  Gastrointestinal ROS: No abdominal pain, No nausea, vomiting, diarrhea, or constipation, no blood in stool  Musculoskeletal/Extremities ROS: Positive for joint pain, per history of present illness  Neurologic ROS: Normal development, No seizures, No weakness  Endocrine ROS: Positive per history of present illness    Physical exam:  /84 mmHg  Pulse 77  Temp(Src) 36.8 °C (98.2 °F)  Resp 16  Ht 1.676 m (5' 6\")  Wt 140.615 kg (310 lb)  BMI 50.06 kg/m2  SpO2 93%  LMP 09/11/2016  General Appearance: Middle-aged female, alert, no distress, morbidly obese, well-groomed  Skin: Skin color, texture, turgor normal. No rashes or lesions.  Lungs: negative findings: normal respiratory rate and rhythm, lungs clear to auscultation  Heart: negative. RRR without murmur, gallop, or rubs.  No ectopy.  Abdomen: Abdomen soft, non-tender. BS normal. No masses,  No organomegaly  Musculoskeletal: positive findings: Decreased range of motion of the joints in bilateral hands due to pain  Neurologic: intact, oriented, and appropriate, judgment intact. Cranial nerves II-12 grossly intact    Medical decision making/discussion: Patient here follow-up visit, repeat labs. She is to follow-up with me in 6 months, sooner if needed. She is to keep appointments coming up with her specialists. She is to stay on current dose of medications as previously prescribed. She is to continue taking her blood pressure daily. She is to schedule " her mammogram as well as appointment with gynecologist for Pap smear. She is also set up appointment for colonoscopy. She is to work on healthy diet, increase physical activity and continue efforts towards weight loss.    Gabriella was seen today for blood pressure problem.    Diagnoses and all orders for this visit:    Subclinical hyperthyroidism    Multiple thyroid nodules    Health care maintenance    Essential hypertension    Arthralgia of both hands    Graves disease          Please note that this dictation was created using voice recognition software. I have made every reasonable attempt to correct obvious errors, but I expect that there are errors of grammar and possibly content that I did not discover before finalizing the note.

## 2017-05-30 NOTE — ASSESSMENT & PLAN NOTE
This is a chronic condition, she has upcoming appointment with new endocrinologist next week on 7 June. She does not take medication for this at this time. However, she does wonder if recent issues with blood pressure may have something to do with her thyroid (see additional notes on HTN). The last time her thyroid was checked, it was January 2017 and results were within normal limits.  I did discuss with her that is certainly possible, but today her blood pressure was within normal limits, 122/84. I encouraged her to keep her upcoming appointment with endocrinology next week. It is likely new provider will do workup including labs.

## 2017-05-30 NOTE — MR AVS SNAPSHOT
"        Gabriella Clifford   2017 10:40 AM   Office Visit   MRN: [id number]    Department:  [facility]   Dept Phone:  [phone]    Description:  Female : 1965   Provider:  VALERIE Lopez           Reason for Visit     Blood Pressure Problem           Allergies as of 2017     Allergen Noted Reactions    Erythromycin 2013       Keflex 2013       Pcn [Penicillins] 2013         Vital Signs     Blood Pressure Pulse Temperature Respirations Height Weight    122/84 mmHg 77 36.8 °C (98.2 °F) 16 1.676 m (5' 6\") 140.615 kg (310 lb)    Body Mass Index Oxygen Saturation Last Menstrual Period Smoking Status          50.06 kg/m2 93% 2016 Former Smoker        Basic Information     Date Of Birth Sex Race Ethnicity Preferred Language    1965 Female White Non- English      Your appointments     2017  9:30 AM   New Patient with Afshin Guardado M.D.   [facility]    [address], [address]  [place] [address] [phone]           Please bring Photo ID, Insurance Cards, All Medication Bottles and copies of any legal documents (such as Living Will, Power of ) If speaking a language besides English please bring an adult . Please arrive 30 minutes prior for check in and registration. You will be receiving a confirmation call a few days before your appointment from our automated call confirmation system.            2017  1:00 PM   New Patient with C Rotation   [facility] (--)    [address]  [place] [id number] [phone]           Please bring enclosed paperwork completed along with your insurance card and photo ID.            Aug 03, 2017  1:30 PM   Follow Up Visit with Maylin Willingham M.D.   [facility] (--)    [address], [address]  [place] [address] [phone]           You will be receiving a confirmation " call a few days before your appointment from our automated call confirmation system.            Nov 06, 2017  1:00 PM   Established Patient with VALERIE Lopez   [facility] [place] [facility]    [address]  [place] [address] [phone]           You will be receiving a confirmation call a few days before your appointment from our automated call confirmation system.              Problem List              ICD-10-CM Priority Class Noted - Resolved    Urticaria, chronic L50.8   1/3/2013 - Present    Hypertension I10   1/3/2013 - Present    Asthma in adult J45.909   1/3/2013 - Present    Shoulder pain M25.519   1/3/2013 - Present    Joint pain M25.50   8/27/2013 - Present    Multiple thyroid nodules E04.2   2/2/2014 - Present    Subclinical hyperthyroidism E05.90   7/31/2014 - Present    Graves disease E05.00   2/12/2015 - Present    Urinary urgency R39.15   9/22/2015 - Present    Burning pain R52   9/22/2015 - Present    Chronic cough R05   12/22/2015 - Present    Morbid obesity with BMI of 45.0-49.9, adult ([facility]) E66.01, Z68.42   3/14/2017 - Present    Health care maintenance Z00.00   4/24/2017 - Present      Health Maintenance        Date Due Completion Dates    MAMMOGRAM 4/30/2015 4/30/2014, 1/17/2013, 1/3/2005 (N/S)    Override on 1/3/2005: (N/S)    COLONOSCOPY 8/9/2016 ---    PAP SMEAR 3/2/2017 3/2/2014 (Prv Comp)    Override on 3/2/2014: Previously completed    IMM DTaP/Tdap/Td Vaccine (2 - Td) 4/24/2027 4/24/2017            Current Immunizations     Influenza TIV (IM) 10/30/2013  7:51 AM    Influenza Vaccine Quad Inj (Pf) 10/15/2014    Pneumococcal polysaccharide vaccine (PPSV-23) 3/14/2017    Tdap Vaccine 4/24/2017      Below and/or attached are the medications your provider expects you to take. Review all of your home medications and newly ordered medications with your provider and/or pharmacist. Follow medication instructions as directed by your provider and/or  pharmacist. Please keep your medication list with you and share with your provider. Update the information when medications are discontinued, doses are changed, or new medications (including over-the-counter products) are added; and carry medication information at all times in the event of emergency situations     Allergies:  ERYTHROMYCIN - (reactions not documented)     KEFLEX - (reactions not documented)     PCN - (reactions not documented)               Medications  Valid as of: May 30, 2017 - 11:07 AM    Generic Name Brand Name Tablet Size Instructions for use    Albuterol Sulfate   Inhale  by mouth.        Azelastine-Fluticasone (Suspension) DYMISTA 137-50 MCG/ACT         Azelastine-Fluticasone (Suspension) Azelastine-Fluticasone 137-50 MCG/ACT Spray 1 Spray in nose 2 Times a Day.        Beclomethasone Dipropionate (Aero Soln) QVAR 80 MCG/ACT Inhale 1 Puff by mouth 2 times a day.          Hydrocodone-Acetaminophen (Tab) NORCO 5-325 MG Take 1 Tab by mouth every 8 hours as needed.        Hydroxychloroquine Sulfate (Tab) PLAQUENIL 200 MG Take 1 Tab by mouth 2 times a day.        Ibuprofen (Tab) MOTRIN 800 MG Take 1 Tab by mouth every 8 hours as needed for Mild Pain.        Losartan Potassium (Tab) COZAAR 100 MG Take 1 Tab by mouth every day.        NIFEdipine (TABLET SR 24 HR) ADALAT CC 60 MG Take 1 Tab by mouth every day.        Tolterodine Tartrate (CAPSULE SR 24 HR) DETROL-LA 2 MG Take 1 Cap by mouth every day.        .                 Medicines prescribed today were sent to:     [id number]  [place], [place] - [address] [address]  AT [address]    [address] [place] [address]    Phone: [phone] Fax: [phone]    Open 24 Hours?: No      Medication refill instructions:       If your prescription bottle indicates you have medication refills left, it is not necessary to call your provider’s office. Please contact your pharmacy and they will refill your  medication.    If your prescription bottle indicates you do not have any refills left, you may request refills at any time through one of the following ways: The online [facility] system (except Urgent Care), by calling your provider’s office, or by asking your pharmacy to contact your provider’s office with a refill request. Medication refills are processed only during regular business hours and may not be available until the next business day. Your provider may request additional information or to have a follow-up visit with you prior to refilling your medication.   *Please Note: Medication refills are assigned a new Rx number when refilled electronically. Your pharmacy may indicate that no refills were authorized even though a new prescription for the same medication is available at the pharmacy. Please request the medicine by name with the pharmacy before contacting your provider for a refill.        Instructions    Stay on your current doses of medications    Keep all of your upcoming appointments with specialists    Keep me posted on your BP    Follow up with me in 6 months--sooner if needed       Other Notes About Your Plan     Last UDS:  2/12/15  DR RICCI  Contolled Substance agreement signed:  2/12/15  DR RICCI               [employer] Access Code: Activation code not generated  Current [facility] Status: Active

## 2017-05-30 NOTE — ASSESSMENT & PLAN NOTE
Patient continues to report issues with her blood pressure. She was seen on May 8 by her rheumatologist in which her blood pressure was 174/98. She has been symptomatic with this including mild dizziness, but she does not have chest pain or shortness of breath, nor does she have syncope. She woke in the middle of the night between May 10 May 11 with racing heart and went to the emergency room in which she had negative workup. She was recommended to have a sleep study, as  reports she snores and often stops breathing in the middle of the night. She was seen in follow-up by one of the other providers here in the [facility] on 11 May, which her blood pressure was 156/92.  Today her blood pressure is 122/84. She was also seen via telemedicine on May 23rd by urology, blood pressure at that time was also within normal limits at 118/80. Today she continues to be asymptomatic. I discussed with her, at this point we will continue to monitor her blood pressure and have her stay her current dose of medications. I encouraged her to keep her upcoming appointment with endocrinology and other specialist. I would like her to continue to take her blood pressure daily. I shortly encouraged her to keep her upcoming appointment with sleep medicine.

## 2017-05-30 NOTE — PATIENT INSTRUCTIONS
Stay on your current doses of medications    Keep all of your upcoming appointments with specialists    Keep me posted on your BP    Follow up with me in 6 months--sooner if needed

## 2017-05-30 NOTE — ASSESSMENT & PLAN NOTE
This is chronic in nature, she has been followed by rheumatology and is currently being worked up. She is to start Plaquenil soon, this will be after her eye exam this week.

## 2017-06-07 ENCOUNTER — OFFICE VISIT (OUTPATIENT)
Dept: ENDOCRINOLOGY | Facility: MEDICAL CENTER | Age: 52
End: 2017-06-07
Payer: COMMERCIAL

## 2017-06-07 VITALS
BODY MASS INDEX: 47.09 KG/M2 | HEIGHT: 66 IN | OXYGEN SATURATION: 94 % | WEIGHT: 293 LBS | HEART RATE: 94 BPM | SYSTOLIC BLOOD PRESSURE: 124 MMHG | DIASTOLIC BLOOD PRESSURE: 82 MMHG

## 2017-06-07 DIAGNOSIS — E55.9 VITAMIN D DEFICIENCY: ICD-10-CM

## 2017-06-07 DIAGNOSIS — E03.9 HYPOTHYROIDISM, UNSPECIFIED TYPE: ICD-10-CM

## 2017-06-07 DIAGNOSIS — E66.01 MORBID OBESITY WITH BMI OF 45.0-49.9, ADULT (HCC): ICD-10-CM

## 2017-06-07 PROCEDURE — 99214 OFFICE O/P EST MOD 30 MIN: CPT | Performed by: INTERNAL MEDICINE

## 2017-06-07 RX ORDER — ERGOCALCIFEROL 1.25 MG/1
50000 CAPSULE ORAL
Qty: 18 CAP | Refills: 0 | Status: SHIPPED | OUTPATIENT
Start: 2017-06-07 | End: 2017-08-02

## 2017-06-07 NOTE — MR AVS SNAPSHOT
"        Gabriella Clifford   2017 9:30 AM   Office Visit   MRN: [id number]    Department:  Endocrinology Med [facility]   Dept Phone:  [phone]    Description:  Female : 1965   Provider:  Afshin Guardado M.D.           Allergies as of 2017     Allergen Noted Reactions    Erythromycin 2013       Keflex 2013       Pcn [Penicillins] 2013         You were diagnosed with     Hypothyroidism, unspecified type   [9134772]       Vitamin D deficiency   [1215162]         Vital Signs     Blood Pressure Pulse Height Weight Body Mass Index Oxygen Saturation    124/82 mmHg 94 1.689 m (5' 6.5\") 140.161 kg (309 lb) 49.13 kg/m2 94%    Last Menstrual Period Smoking Status                2016 Former Smoker          Basic Information     Date Of Birth Sex Race Ethnicity Preferred Language    1965 Female White Non- English      Your appointments     2017 11:10 AM   MA SCRN10 with [facility] MG 1   [facility] ([address])    [address] [place] [address] [address]  [facility] [address] [phone]           No deodorant, powder, perfume or lotion under the arm or breast area.            2017  1:00 PM   New Patient with YOLI Colunga   [facility] (--)    [address]  [place] [address] [phone]           Please bring enclosed paperwork completed along with your insurance card and photo ID.            2017 10:10 AM   Established Patient with Afshin Guardado M.D.   [facility])    [address], [address]  [facility] [address] [phone]           You will be receiving a confirmation call a few days before your appointment from our automated call confirmation system.            Aug 03, 2017  1:30 PM   Follow Up Visit with Maylin Willingham M.D.   [facility] (--)    [address] [[email or web address]  [facility] [address] [phone]           You will be receiving a " confirmation call a few days before your appointment from our automated call confirmation system.            Nov 06, 2017  1:00 PM   Established Patient with VALERIE Lopez   [facility] [place]    [address]  [place] [address] [phone]           You will be receiving a confirmation call a few days before your appointment from our automated call confirmation system.              Problem List              ICD-10-CM Priority Class Noted - Resolved    Urticaria, chronic L50.8   1/3/2013 - Present    Hypertension I10   1/3/2013 - Present    Asthma in adult J45.909   1/3/2013 - Present    Shoulder pain M25.519   1/3/2013 - Present    Joint pain M25.50   8/27/2013 - Present    Multiple thyroid nodules E04.2   2/2/2014 - Present    Subclinical hyperthyroidism E05.90   7/31/2014 - Present    Graves disease E05.00   2/12/2015 - Present    Urinary urgency R39.15   9/22/2015 - Present    Burning pain R52   9/22/2015 - Present    Chronic cough R05   12/22/2015 - Present    Morbid obesity with BMI of 45.0-49.9, adult ([facility]) E66.01, Z68.42   3/14/2017 - Present    Health care maintenance Z00.00   4/24/2017 - Present      Health Maintenance        Date Due Completion Dates    MAMMOGRAM 4/30/2015 4/30/2014, 1/17/2013, 1/3/2005 (N/S)    Override on 1/3/2005: (N/S)    COLONOSCOPY 8/9/2016 ---    PAP SMEAR 3/2/2017 3/2/2014 (Prv Comp)    Override on 3/2/2014: Previously completed    IMM DTaP/Tdap/Td Vaccine (2 - Td) 4/24/2027 4/24/2017            Current Immunizations     Influenza TIV (IM) 10/30/2013  7:51 AM    Influenza Vaccine Quad Inj (Pf) 10/15/2014    Pneumococcal polysaccharide vaccine (PPSV-23) 3/14/2017    Tdap Vaccine 4/24/2017      Below and/or attached are the medications your provider expects you to take. Review all of your home medications and newly ordered medications with your provider and/or pharmacist. Follow medication instructions as directed by your provider  and/or pharmacist. Please keep your medication list with you and share with your provider. Update the information when medications are discontinued, doses are changed, or new medications (including over-the-counter products) are added; and carry medication information at all times in the event of emergency situations     Allergies:  ERYTHROMYCIN - (reactions not documented)     KEFLEX - (reactions not documented)     PCN - (reactions not documented)               Medications  Valid as of: June 07, 2017 -  9:50 AM    Generic Name Brand Name Tablet Size Instructions for use    Albuterol Sulfate   Inhale  by mouth.        Azelastine-Fluticasone (Suspension) DYMISTA 137-50 MCG/ACT         Azelastine-Fluticasone (Suspension) Azelastine-Fluticasone 137-50 MCG/ACT Spray 1 Spray in nose 2 Times a Day.        Beclomethasone Dipropionate (Aero Soln) QVAR 80 MCG/ACT Inhale 1 Puff by mouth 2 times a day.          Ergocalciferol (Cap) DRISDOL 61153 UNITS Take 1 Cap by mouth every 3 days for 56 days.        Hydrocodone-Acetaminophen (Tab) NORCO 5-325 MG Take 1 Tab by mouth every 8 hours as needed.        Hydroxychloroquine Sulfate (Tab) PLAQUENIL 200 MG Take 1 Tab by mouth 2 times a day.        Ibuprofen (Tab) MOTRIN 800 MG Take 1 Tab by mouth every 8 hours as needed for Mild Pain.        Losartan Potassium (Tab) COZAAR 100 MG Take 1 Tab by mouth every day.        NIFEdipine (TABLET SR 24 HR) ADALAT CC 60 MG Take 1 Tab by mouth every day.        Tolterodine Tartrate (CAPSULE SR 24 HR) DETROL-LA 2 MG Take 1 Cap by mouth every day.        .                 Medicines prescribed today were sent to:     [id number] [place] [facility] [place] [place] - [address]    [address] [place] [address]    Phone: [phone] Fax: [phone]    Open 24 Hours?: No      Medication refill instructions:       If your prescription bottle indicates you have medication refills left, it is not  necessary to call your provider’s office. Please contact your pharmacy and they will refill your medication.    If your prescription bottle indicates you do not have any refills left, you may request refills at any time through one of the following ways: The online [employer] system (except Urgent Care), by calling your provider’s office, or by asking your pharmacy to contact your provider’s office with a refill request. Medication refills are processed only during regular business hours and may not be available until the next business day. Your provider may request additional information or to have a follow-up visit with you prior to refilling your medication.   *Please Note: Medication refills are assigned a new Rx number when refilled electronically. Your pharmacy may indicate that no refills were authorized even though a new prescription for the same medication is available at the pharmacy. Please request the medicine by name with the pharmacy before contacting your provider for a refill.        Your To Do List     Future Labs/Procedures Complete By Expires    FREE THYROXINE  7/19/2017 6/7/2018    Scheduling Instructions:    To be done after 6 weeks.    Comments:    Free T4 by equilibrium dialysis only.    THYROID PEROXIDASE  (TPO) AB  7/19/2017 6/7/2018    TRIIDOTHYRONINE  7/19/2017 6/7/2018    Scheduling Instructions:    To be done after 6 weeks.    Comments:    Free T3    TRIIDOTHYRONINE  7/19/2017 6/7/2018    Scheduling Instructions:    To be done after 6 weeks.    Comments:    Total T3    TSH  7/19/2017 6/7/2018    Scheduling Instructions:    To be done after 6 weeks.    THYROID PEROXIDASE  (TPO) AB  As directed 6/7/2018    THYROTROPIN RECEP AB  As directed 6/7/2018      Other Notes About Your Plan     Last UDS:  2/12/15  DR RICCI  Contolled Substance agreement signed:  2/12/15  DR RICCI               [employer] Access Code: Activation code not generated  Current [employer] Status: Active

## 2017-06-08 NOTE — PROGRESS NOTES
Endocrinology Clinic Progress Note  PCP: VALERIE Lopez    Follow-up for abnormal thyroid function    HPI:  Gabriella Clifford is a 51 y.o. old patient who comes in today for routine follow up. I reviewed a trend of thyroid functioning detail in the last few years. It appears again that the TSH has been consistently either in the low or  very low range Free T4 either being low or low normal and T3 mostly close to mid normal range. Appears that she may have central hypothyroidism. He continues to have fatigue and tiredness. She denies any symptoms of hyperthyroidism.    ROS:  Constitutional: Fatigue, No unintentional weight loss  Endo: Denies excessive thirst or frequent urination    Past Medical History:  Patient Active Problem List    Diagnosis Date Noted   • Health care maintenance 04/24/2017   • Morbid obesity with BMI of 45.0-49.9, adult ([facility]) 03/14/2017   • Chronic cough 12/22/2015   • Urinary urgency 09/22/2015   • Burning pain 09/22/2015   • Graves disease 02/12/2015   • Subclinical hyperthyroidism 07/31/2014   • Multiple thyroid nodules 02/02/2014   • Joint pain 08/27/2013   • Urticaria, chronic 01/03/2013   • Hypertension 01/03/2013   • Asthma in adult 01/03/2013   • Shoulder pain 01/03/2013       Medications:    Current outpatient prescriptions:   •  vitamin D, Ergocalciferol, (DRISDOL) 28811 UNITS Cap capsule, Take 1 Cap by mouth every 3 days for 56 days., Disp: 18 Cap, Rfl: 0  •  tolterodine ER (DETROL-LA) 2 MG CAPSULE SR 24 HR, Take 1 Cap by mouth every day., Disp: 30 Cap, Rfl: 11  •  losartan (COZAAR) 100 MG Tab, Take 1 Tab by mouth every day., Disp: 30 Tab, Rfl: 2  •  ibuprofen (MOTRIN) 800 MG Tab, Take 1 Tab by mouth every 8 hours as needed for Mild Pain., Disp: 90 Tab, Rfl: 3  •  hydrocodone-acetaminophen (NORCO) 5-325 MG Tab per tablet, Take 1 Tab by mouth every 8 hours as needed., Disp: 60 Tab, Rfl: 0  •  NIFEdipine (ADALAT CC) 60 MG CR tablet, Take 1 Tab by mouth every day., Disp: 90  "Tab, Rfl: 3  •  Azelastine-Fluticasone (DYMISTA) 137-50 MCG/ACT Suspension, Spray 1 Spray in nose 2 Times a Day., Disp: 1 Bottle, Rfl: 0  •  Albuterol Sulfate (PROAIR HFA INH), Inhale  by mouth., Disp: , Rfl:   •  hydroxychloroquine (PLAQUENIL) 200 MG Tab, Take 1 Tab by mouth 2 times a day., Disp: 60 Tab, Rfl: 1  •  DYMISTA 137-50 MCG/ACT Suspension, , Disp: , Rfl: 6  •  beclomethasone (QVAR) 80 MCG/ACT inhaler, Inhale 1 Puff by mouth 2 times a day.  , Disp: , Rfl:     Labs:     Physical Examination:  Vital signs: /82 mmHg  Pulse 94  Ht 1.689 m (5' 6.5\")  Wt 140.161 kg (309 lb)  BMI 49.13 kg/m2  SpO2 94%  LMP 09/11/2016  General: No apparent distress, cooperative,morbidly obese  Eyes: No scleral icterus, no discharge, normal eyelids  Neck: No abnormal masses on inspection   Resp: Normal effort, clear to auscultation bilaterally  CVS: Regular rate and rhythm, S1 S2 normal, no murmur  Extremities: No lower extremity edema  Abdomen obesity present  Musculoskeletal: Normal digits and nails  Skin: No rash on visible skin  Psych: Alert and oriented, normal mood and affect    Assessment and Plan:    1. Hypothyroidism, unspecified type  Her hypothyroidism appears to be central hypothyroidism. We'll repeat the thyroid function again for better clarity most likely get her started on thyroid hormone replacement in the next visit or so. Review mainly then be titrating her dose of levothyroxine based on her T4 and T3 levels only and not look at TSH.    2. Vitamin D deficiency  Can contribute to some of her fatigue. Loading dose as prescribed    3. Morbid Obesity: If restarted on thyroid hormone replacement it will help her with the weight loss. I will discuss in detail with her the weight management plan in the subsequent visits.    Return in about 2 months (around 8/7/2017).    Thank you for allowing me to participate in the care of this patient.    Afshin Guardado M.D.    CC:   ARAVIND Lopez.    This " note was created using voice recognition software (Dragon). The accuracy of the dictation is limited by the abilities of the software. I have reviewed the note prior to signing, however some errors in grammar and context are still possible. If you have any questions related to this note please do not hesitate to contact our office.

## 2017-06-21 ENCOUNTER — HOSPITAL ENCOUNTER (OUTPATIENT)
Dept: RADIOLOGY | Facility: MEDICAL CENTER | Age: 52
End: 2017-06-21
Attending: INTERNAL MEDICINE
Payer: COMMERCIAL

## 2017-06-21 DIAGNOSIS — Z12.31 ENCOUNTER FOR MAMMOGRAM TO ESTABLISH BASELINE MAMMOGRAM: ICD-10-CM

## 2017-06-21 PROCEDURE — 77063 BREAST TOMOSYNTHESIS BI: CPT

## 2017-06-21 ASSESSMENT — ENCOUNTER SYMPTOMS: SLEEP DISTURBANCE: 0

## 2017-06-22 ENCOUNTER — SLEEP CENTER VISIT (OUTPATIENT)
Dept: SLEEP MEDICINE | Facility: MEDICAL CENTER | Age: 52
End: 2017-06-22
Payer: COMMERCIAL

## 2017-06-22 VITALS
SYSTOLIC BLOOD PRESSURE: 132 MMHG | RESPIRATION RATE: 16 BRPM | DIASTOLIC BLOOD PRESSURE: 80 MMHG | OXYGEN SATURATION: 91 % | HEIGHT: 66 IN | HEART RATE: 89 BPM | WEIGHT: 293 LBS | BODY MASS INDEX: 47.09 KG/M2

## 2017-06-22 DIAGNOSIS — L50.8 URTICARIA, CHRONIC: ICD-10-CM

## 2017-06-22 DIAGNOSIS — E03.9 ACQUIRED HYPOTHYROIDISM: ICD-10-CM

## 2017-06-22 DIAGNOSIS — I10 ESSENTIAL HYPERTENSION: ICD-10-CM

## 2017-06-22 DIAGNOSIS — J45.20 ASTHMA IN ADULT, MILD INTERMITTENT, UNCOMPLICATED: ICD-10-CM

## 2017-06-22 DIAGNOSIS — E05.00 GRAVES DISEASE: ICD-10-CM

## 2017-06-22 DIAGNOSIS — G47.33 OSA (OBSTRUCTIVE SLEEP APNEA): ICD-10-CM

## 2017-06-22 DIAGNOSIS — E66.01 MORBID OBESITY WITH BMI OF 45.0-49.9, ADULT (HCC): ICD-10-CM

## 2017-06-22 PROCEDURE — 99204 OFFICE O/P NEW MOD 45 MIN: CPT | Performed by: NURSE PRACTITIONER

## 2017-06-22 RX ORDER — ZOLPIDEM TARTRATE 5 MG/1
5 TABLET ORAL NIGHTLY PRN
Qty: 3 TAB | Refills: 0 | Status: SHIPPED | OUTPATIENT
Start: 2017-06-22 | End: 2017-11-14

## 2017-06-22 NOTE — PATIENT INSTRUCTIONS
1. Diagnostic sleep study ASAP  2. Rx for Ambien provided for the night of the study. Please fill this prescription and bring it with you.  3. Follow up in our office after testing to review results

## 2017-06-22 NOTE — MR AVS SNAPSHOT
"        Gabriella Clifford   2017 1:00 PM   Sleep Center Visit   MRN: [id number]    Department:  [facility]   Dept Phone:  [phone]    Description:  Female : 1965   Provider:  IVAN RecinosRVALERIE           Reason for Visit     New Patient DENISE eval      Allergies as of 2017     Allergen Noted Reactions    Erythromycin 2013       Keflex 2013       Pcn [Penicillins] 2013         You were diagnosed with     DENISE (obstructive sleep apnea)   [[id number]]         Vital Signs     Blood Pressure Pulse Respirations Height Weight Body Mass Index    132/80 mmHg 89 16 1.676 m (5' 6\") 139.935 kg (308 lb 8 oz) 49.82 kg/m2    Oxygen Saturation Last Menstrual Period Smoking Status             91% 2016 Former Smoker         Basic Information     Date Of Birth Sex Race Ethnicity Preferred Language    1965 Female White Non- English      Your appointments     2017 10:10 AM   Established Patient with Afshin Guardado M.D.   [facility]    [address]  [facility] [address] [phone]           You will be receiving a confirmation call a few days before your appointment from our automated call confirmation system.            Aug 03, 2017  1:30 PM   Follow Up Visit with Maylin Willingham M.D.   [facility] (--)    [address]  [facility] [address] [phone]           You will be receiving a confirmation call a few days before your appointment from our automated call confirmation system.            Aug 18, 2017  8:10 PM   Sleep Study Diagnostic with SLEEP TECH   [facility] (--)    [address]  [place] [address] [phone]            Aug 24, 2017  1:40 PM   Follow UP with VALERIE Recinos   [facility] (--)    [address]  [place] [address] [phone]            2017  " 1:00 PM   Established Patient with VALERIE Lopez   [facility]    [address]  [place] [address] [phone]           You will be receiving a confirmation call a few days before your appointment from our automated call confirmation system.              Problem List              ICD-10-CM Priority Class Noted - Resolved    Urticaria, chronic L50.8   1/3/2013 - Present    Hypertension I10   1/3/2013 - Present    Asthma in adult J45.909   1/3/2013 - Present    Shoulder pain M25.519   1/3/2013 - Present    Joint pain M25.50   8/27/2013 - Present    Multiple thyroid nodules E04.2   2/2/2014 - Present    Subclinical hyperthyroidism E05.90   7/31/2014 - Present    Graves disease E05.00   2/12/2015 - Present    Urinary urgency R39.15   9/22/2015 - Present    Burning pain R52   9/22/2015 - Present    Chronic cough R05   12/22/2015 - Present    Morbid obesity with BMI of 45.0-49.9, adult (HCC) E66.01, Z68.42   3/14/2017 - Present    Health care maintenance Z00.00   4/24/2017 - Present    DENISE (obstructive sleep apnea)-presumed G47.33   6/22/2017 - Present      Health Maintenance        Date Due Completion Dates    COLONOSCOPY 8/9/2016 ---    PAP SMEAR 3/2/2017 3/2/2014 (Prv Comp)    Override on 3/2/2014: Previously completed    MAMMOGRAM 6/21/2018 6/21/2017, 4/30/2014, 1/17/2013, 1/3/2005 (N/S)    Override on 1/3/2005: (N/S)    IMM DTaP/Tdap/Td Vaccine (2 - Td) 4/24/2027 4/24/2017            Current Immunizations     Influenza TIV (IM) 10/30/2013  7:51 AM    Influenza Vaccine Quad Inj (Pf) 10/15/2014    Pneumococcal polysaccharide vaccine (PPSV-23) 3/14/2017    Tdap Vaccine 4/24/2017      Below and/or attached are the medications your provider expects you to take. Review all of your home medications and newly ordered medications with your provider and/or pharmacist. Follow medication instructions as directed by your provider and/or pharmacist. Please keep your  medication list with you and share with your provider. Update the information when medications are discontinued, doses are changed, or new medications (including over-the-counter products) are added; and carry medication information at all times in the event of emergency situations     Allergies:  ERYTHROMYCIN - (reactions not documented)     KEFLEX - (reactions not documented)     PCN - (reactions not documented)               Medications  Valid as of: June 22, 2017 -  1:48 PM    Generic Name Brand Name Tablet Size Instructions for use    Albuterol Sulfate   Inhale  by mouth.        Azelastine-Fluticasone (Suspension) DYMISTA 137-50 MCG/ACT         Azelastine-Fluticasone (Suspension) Azelastine-Fluticasone 137-50 MCG/ACT Spray 1 Spray in nose 2 Times a Day.        Beclomethasone Dipropionate (Aero Soln) QVAR 80 MCG/ACT Inhale 1 Puff by mouth 2 times a day.          Ergocalciferol (Cap) DRISDOL 26125 UNITS Take 1 Cap by mouth every 3 days for 56 days.        Hydrocodone-Acetaminophen (Tab) NORCO 5-325 MG Take 1 Tab by mouth every 8 hours as needed.        Hydroxychloroquine Sulfate (Tab) PLAQUENIL 200 MG Take 1 Tab by mouth 2 times a day.        Ibuprofen (Tab) MOTRIN 800 MG Take 1 Tab by mouth every 8 hours as needed for Mild Pain.        Losartan Potassium (Tab) COZAAR 100 MG Take 1 Tab by mouth every day.        NIFEdipine (TABLET SR 24 HR) ADALAT CC 60 MG Take 1 Tab by mouth every day.        Tolterodine Tartrate (CAPSULE SR 24 HR) DETROL-LA 2 MG Take 1 Cap by mouth every day.        Zolpidem Tartrate (Tab) AMBIEN 5 MG Take 1 Tab by mouth at bedtime as needed for Sleep (1 to 3 po qhs prn insomnia/sleep study. Bring to sleep study.).        .                 Medicines prescribed today were sent to:     [id number] - [place], [place] - [address]    [address] [place] [address]    Phone: [phone] Fax: [phone]    Open 24 Hours?: No         Medication refill instructions:       If your prescription bottle indicates you have medication refills left, it is not necessary to call your provider’s office. Please contact your pharmacy and they will refill your medication.    If your prescription bottle indicates you do not have any refills left, you may request refills at any time through one of the following ways: The online [employer] system (except Urgent Care), by calling your provider’s office, or by asking your pharmacy to contact your provider’s office with a refill request. Medication refills are processed only during regular business hours and may not be available until the next business day. Your provider may request additional information or to have a follow-up visit with you prior to refilling your medication.   *Please Note: Medication refills are assigned a new Rx number when refilled electronically. Your pharmacy may indicate that no refills were authorized even though a new prescription for the same medication is available at the pharmacy. Please request the medicine by name with the pharmacy before contacting your provider for a refill.        Your To Do List     Future Labs/Procedures Complete By Expires    POLYSOMNOGRAPHY, 4 OR MORE  As directed 6/22/2018      Other Notes About Your Plan     Last UDS:  2/12/15  DR RICCI  Contolled Substance agreement signed:  2/12/15  DR KEIRY Elt Access Code: Activation code not generated  Current [employer] Status: Active

## 2017-06-22 NOTE — PROGRESS NOTES
Chief Complaint   Patient presents with   • New Patient     DENISE eval         HPI: This patient is a 51 y.o. female, who presents for evaluation and management of suspected sleep-disordered breathing. She was referred by her PCP. Medical history includes obesity, BMI 50, seasonal allergies, mild intermittent asthma, chronic urticaria, history Graves disease now hypothyroid followed by endocrinology, HTN, currently being worked up for possible RA.    Her sleep symptoms have been worse for the past 6 months. Her  notes loud snoring and witnessed apneas. Patient does not seem to be bothered by this. She typically gets up 2-3 times per night to use the bathroom. Occasionally has a resuscitative gasps. She notes mild fatigue but attributes this to her multiple medical conditions. She denies a.m. headaches. She remembers one evening waking with palpitations, taking her blood pressure noticing it was elevated. Symptoms resolved after a while. This is not a routine occurrence, she attributed it to her Graves' disease.    Past Medical History   Diagnosis Date   • Urticaria, chronic 1/3/2013   • Hypertension    • Bursitis of shoulder, left    • Rotator cuff tear, left    • Graves disease    • Seasonal allergies    • Rheumatoid arthritis ([facility])    • Thai measles    • Chickenpox        Social History   Substance Use Topics   • Smoking status: Former Smoker -- 0.25 packs/day     Types: Cigarettes     Quit date: 06/22/2015   • Smokeless tobacco: Never Used   • Alcohol Use: 0.0 oz/week     0 Standard drinks or equivalent per week      Comment: Occassionally       Family History   Problem Relation Age of Onset   • Arthritis Mother      RA   • Cancer Mother      breast   • Hypertension Father    • Cancer Maternal Grandmother        Current medications as of today   Current Outpatient Prescriptions   Medication Sig Dispense Refill   • zolpidem (AMBIEN) 5 MG Tab Take 1 Tab by mouth at bedtime as needed for Sleep (1 to 3 po  "[facility] prn insomnia/sleep study. Bring to sleep study.). 3 Tab 0   • vitamin D, Ergocalciferol, (DRISDOL) 37658 UNITS Cap capsule Take 1 Cap by mouth every 3 days for 56 days. 18 Cap 0   • tolterodine ER (DETROL-LA) 2 MG CAPSULE SR 24 HR Take 1 Cap by mouth every day. 30 Cap 11   • losartan (COZAAR) 100 MG Tab Take 1 Tab by mouth every day. 30 Tab 2   • ibuprofen (MOTRIN) 800 MG Tab Take 1 Tab by mouth every 8 hours as needed for Mild Pain. 90 Tab 3   • hydrocodone-acetaminophen (NORCO) 5-325 MG Tab per tablet Take 1 Tab by mouth every 8 hours as needed. 60 Tab 0   • NIFEdipine (ADALAT CC) 60 MG CR tablet Take 1 Tab by mouth every day. 90 Tab 3   • Azelastine-Fluticasone (DYMISTA) 137-50 MCG/ACT Suspension Spray 1 Spray in nose 2 Times a Day. 1 Bottle 0   • Albuterol Sulfate (PROAIR HFA INH) Inhale  by mouth.       No current facility-administered medications for this visit.       Allergies: Erythromycin; Keflex; and Pcn    Blood pressure 132/80, pulse 89, resp. rate 16, height 1.676 m (5' 6\"), weight 139.935 kg (308 lb 8 oz), last menstrual period 09/11/2016, SpO2 91 %.      ROS:   Constitutional: Denies fevers, chills, night sweats, weight loss. Positive for fatigue.  HEENT: Denies earache, difficulty hearing, tinnitus, nasal congestion, hoarseness  Cardiovascular: Denies chest pain, tightness, palpitations, orthopnea or edema  Respiratory: Denies cough, wheeze, dyspnea, hemoptysis  Sleep: See HPI  GI: Denies heartburn, dysphagia, nausea, abdominal pain, diarrhea or constipation  : Denies frequent urination, hematuria, discharge or painful urination  Musculoskeletal: Denies back pain, painful joints, sore muscles  Neurological: Denies weakness or headaches  Skin: No rashes    Physical exam:   Appearance: Obese, in no acute distress  HEENT: Normocephalic, atraumatic, white sclera, PERRLA, Graves ophthalmopathy  Respiratory: no intercostal retractions or accessory muscle use   Lungs auscultation: Clear to " "auscultation bilaterally  Cardiovascular: Regular rate rhythm no murmurs, rubs or gallops  Gait: Normal  Digits: No clubbing, cyanosis  Motor: No focal deficits  Orientation: Oriented to time, person and place    Diagnosis:  1. DENISE (obstructive sleep apnea)-presumed  POLYSOMNOGRAPHY, 4 OR MORE    zolpidem (AMBIEN) 5 MG Tab    CANCELED: DME CPAP   2. Urticaria, chronic     3. Essential hypertension     4. Asthma in adult, mild intermittent, uncomplicated     5. Graves disease     6. Morbid obesity with BMI of 45.0-49.9, adult (HCC)     7. Acquired hypothyroidism         Plan:   I reviewed with the patient the pathophysiology of obstructive sleep apnea as well as potential cardiac and neurologic risks associated with untreated sleep apnea. Treatment options including ENT referral, dental appliances and CPAP therapy were discussed. Patient is amendable to further testing and CPAP if necessary.     1. Diagnostic sleep study ASAP  2. Rx for Ambien provided for the night of the study.  3. Follow up in our office after testing to review resultsAnswers for HPI/ROS submitted by the patient on 6/21/2017   Year of your last physical exam: 2017  Results of exam: more tests  Occupation : cohort   Height: 5'6\"  Current weight: 300  6 months ago: 275  At age 21: 130  What is the reason for your visit today?:  concerned  Name of person referring you to the Sleep Center: Dr Ballard  Have you ever been hospitalized?: Yes  Reason, year, and hospital in which you were hospitalized:: jaw surgery 1978, when kids were born,   Have you ever had problems with anesthesia?: Yes  Have you experienced post-operative delirium?: No  Any complications with surgery?: No  What year did you receive your last Flu shot?: 2017  What year did you receive you last Pneumonia shot?: 2017  Have you had a TB skin test? If so, please list the year and result:: yes RAFAEL negative  Have you had Allergy skin testing? If so, please list the year and " result:: yes ask me long story  Please briefly describe your sleep problem and how old you were when it began.:  says I stop breathing  2017  How does this affect your daily life and activities? Please also rate how serious of a problem this is (1 = Not at all, 10 = Very Serious).: 1  Have you had any previous evaluations, examinations, or treatment for this sleep problem or any other problems with your sleep? If so, please describe the evaluation, treatment, and results.: no  Have you used any medications (prescribed or otherwise) to help your sleep problem? If yes, include name, amount, frequency, and the prescribing physician.: no  If employed, what time do you usually start and end work?: 8:00 a.m. - 12:00   Do you ever change work shifts? If yes, describe how often (never, infrequently, regularly).: work for school off over summer  What time do you usually go to bed and wake up on: Weekdays? Weekends?: 10 - 6 (school) 11-7 (summer)  Do you have a regular bed partner?: Yes  How many minutes does it usually take to fall asleep at night after turning off the lights?: less than 5  What do you ordinarily do just prior to turning out the lights and attempting to go to sleep (e.g., reading, TV, baths, etc.)?: read  On average, how many times do you wake up during the night?: every couple hours to pee  On average, how many times do you wake up to use the bathroom?: 3  Do you often wake up too early in the morning and are unable to return to sleep?: no  On average, how many hours of sleep do you get per night?: 7-8  How do you usually awaken?  Alarm, spontaneously, or other?: alarm (school year)  Is it difficult for you to awaken and get out of bed after sleeping? (Not at all, Sometimes, Very): sometimes  Do you nap or return to bed after arising?: sometimes  Are you bothered by sleepiness during the day?: sometimes  Do you feel that you get too much sleep at night?: No  Do you feel that you get too little sleep  "at night?: Sometimes  Do you usually feel tired during the day? If so, what do you attribute this to?: yes autoimmune issues  Do you find yourself falling asleep when you don't mean to? : no  Have you ever suddenly fallen?: no  Have you ever experienced sudden body weakness?: no  Have you ever experienced weakness or paralysis upon going to sleep?: no  Have you ever experienced weakness or paralysis upon awakening from sleep?: no  If yes for either of the above two questions, please indicate how many times per week does this occur?: no  Have you ever experienced seeing things or hearing voices/noises: That weren't real? On going to sleep? During the night? On awakening from sleep? During the day?: No  Do you have difficulty breathing at night? If yes, briefly describe.: no  Have you been told you snore while asleep? If so, does it disturb a bed partner (or someone in the same room), or someone in the next room?: yes  Have you ever experienced doing something without being aware of the action? If yes, please describe.: no  Have you ever experienced upon lying in bed before sleep or on awakening from sleep: Restlessness of legs, \"nervous legs\", \"creeping crawling\" sensation of legs, or twitching of legs?: yes  How many times per week does this occur, and how many minutes does the sensation last?: only ocassionally   Does anything relieve the sensations (e.g., getting out of bed, medication, massage)?: Yes  At what age did this first occur, and how many years has this occurred?: I don't know  get up jump around it goes away  Have you ever been told that your arms or legs jerk or twitch while you are asleep? If yes, how many times per night does this occur?: yes  often  At what age did this first occur, and how many years has this occurred?: last year   Does this seem to awaken you from your sleep?: No  Do you know, or have you ever been told that you do any of the following while sleeping: talk, walk, grit teeth, wet " the bed, wake up screaming or seemingly afraid, have disturbing dreams, have unusual movements, wake up with headaches, (males) have erections? If yes to any of these, please indicate how many times per week, age started, last occurrence, treatment received.: no  Has anyone in your family been known to have any sleep problems? If yes, please list the type of problem (e.g., trouble getting to sleep, too sleepy, bed wetting, etc.), the relationship of this person to you, and the treatment received.: no

## 2017-07-11 ENCOUNTER — HOSPITAL ENCOUNTER (OUTPATIENT)
Dept: LAB | Facility: MEDICAL CENTER | Age: 52
End: 2017-07-11
Attending: INTERNAL MEDICINE
Payer: COMMERCIAL

## 2017-07-11 DIAGNOSIS — M06.00 SERONEGATIVE RHEUMATOID ARTHRITIS (HCC): ICD-10-CM

## 2017-07-11 DIAGNOSIS — E03.9 HYPOTHYROIDISM, UNSPECIFIED TYPE: ICD-10-CM

## 2017-07-11 LAB
ALBUMIN SERPL BCP-MCNC: 4 G/DL (ref 3.2–4.9)
ALBUMIN/GLOB SERPL: 1.3 G/DL
ALP SERPL-CCNC: 40 U/L (ref 30–99)
ALT SERPL-CCNC: 17 U/L (ref 2–50)
ANION GAP SERPL CALC-SCNC: 7 MMOL/L (ref 0–11.9)
AST SERPL-CCNC: 12 U/L (ref 12–45)
BASOPHILS # BLD AUTO: 0.7 % (ref 0–1.8)
BASOPHILS # BLD: 0.06 K/UL (ref 0–0.12)
BILIRUB SERPL-MCNC: 0.4 MG/DL (ref 0.1–1.5)
BUN SERPL-MCNC: 20 MG/DL (ref 8–22)
CALCIUM SERPL-MCNC: 9.4 MG/DL (ref 8.5–10.5)
CHLORIDE SERPL-SCNC: 104 MMOL/L (ref 96–112)
CO2 SERPL-SCNC: 24 MMOL/L (ref 20–33)
CREAT SERPL-MCNC: 0.64 MG/DL (ref 0.5–1.4)
CRP SERPL HS-MCNC: 1.51 MG/DL (ref 0–0.75)
EOSINOPHIL # BLD AUTO: 0.17 K/UL (ref 0–0.51)
EOSINOPHIL NFR BLD: 2 % (ref 0–6.9)
ERYTHROCYTE [DISTWIDTH] IN BLOOD BY AUTOMATED COUNT: 43.2 FL (ref 35.9–50)
ERYTHROCYTE [SEDIMENTATION RATE] IN BLOOD BY WESTERGREN METHOD: 16 MM/HOUR (ref 0–30)
GFR SERPL CREATININE-BSD FRML MDRD: >60 ML/MIN/1.73 M 2
GLOBULIN SER CALC-MCNC: 3.1 G/DL (ref 1.9–3.5)
GLUCOSE SERPL-MCNC: 104 MG/DL (ref 65–99)
HCT VFR BLD AUTO: 42.3 % (ref 37–47)
HGB BLD-MCNC: 14.2 G/DL (ref 12–16)
IMM GRANULOCYTES # BLD AUTO: 0.01 K/UL (ref 0–0.11)
IMM GRANULOCYTES NFR BLD AUTO: 0.1 % (ref 0–0.9)
LYMPHOCYTES # BLD AUTO: 2.76 K/UL (ref 1–4.8)
LYMPHOCYTES NFR BLD: 32.2 % (ref 22–41)
MCH RBC QN AUTO: 29.7 PG (ref 27–33)
MCHC RBC AUTO-ENTMCNC: 33.6 G/DL (ref 33.6–35)
MCV RBC AUTO: 88.5 FL (ref 81.4–97.8)
MONOCYTES # BLD AUTO: 0.62 K/UL (ref 0–0.85)
MONOCYTES NFR BLD AUTO: 7.2 % (ref 0–13.4)
NEUTROPHILS # BLD AUTO: 4.95 K/UL (ref 2–7.15)
NEUTROPHILS NFR BLD: 57.8 % (ref 44–72)
NRBC # BLD AUTO: 0 K/UL
NRBC BLD AUTO-RTO: 0 /100 WBC
PLATELET # BLD AUTO: 318 K/UL (ref 164–446)
PMV BLD AUTO: 9.4 FL (ref 9–12.9)
POTASSIUM SERPL-SCNC: 3.7 MMOL/L (ref 3.6–5.5)
PROT SERPL-MCNC: 7.1 G/DL (ref 6–8.2)
RBC # BLD AUTO: 4.78 M/UL (ref 4.2–5.4)
SODIUM SERPL-SCNC: 135 MMOL/L (ref 135–145)
T3 SERPL-MCNC: 98.4 NG/DL (ref 60–181)
T3FREE SERPL-MCNC: 3.25 PG/ML (ref 2.4–4.2)
T4 FREE SERPL-MCNC: 0.56 NG/DL (ref 0.53–1.43)
THYROPEROXIDASE AB SERPL-ACNC: 11.1 IU/ML (ref 0–9)
TSH SERPL DL<=0.005 MIU/L-ACNC: 1.7 UIU/ML (ref 0.3–3.7)
WBC # BLD AUTO: 8.6 K/UL (ref 4.8–10.8)

## 2017-07-11 PROCEDURE — 85652 RBC SED RATE AUTOMATED: CPT

## 2017-07-11 PROCEDURE — 86140 C-REACTIVE PROTEIN: CPT

## 2017-07-11 PROCEDURE — 36415 COLL VENOUS BLD VENIPUNCTURE: CPT

## 2017-07-11 PROCEDURE — 84481 FREE ASSAY (FT-3): CPT

## 2017-07-11 PROCEDURE — 85025 COMPLETE CBC W/AUTO DIFF WBC: CPT

## 2017-07-11 PROCEDURE — 80053 COMPREHEN METABOLIC PANEL: CPT

## 2017-07-11 PROCEDURE — 84439 ASSAY OF FREE THYROXINE: CPT

## 2017-07-11 PROCEDURE — 83520 IMMUNOASSAY QUANT NOS NONAB: CPT

## 2017-07-11 PROCEDURE — 84480 ASSAY TRIIODOTHYRONINE (T3): CPT

## 2017-07-11 PROCEDURE — 84443 ASSAY THYROID STIM HORMONE: CPT

## 2017-07-11 PROCEDURE — 86376 MICROSOMAL ANTIBODY EACH: CPT

## 2017-07-13 LAB — TSH RECEP AB SER-ACNC: 2.53 IU/L

## 2017-07-26 ENCOUNTER — OFFICE VISIT (OUTPATIENT)
Dept: ENDOCRINOLOGY | Facility: MEDICAL CENTER | Age: 52
End: 2017-07-26
Payer: COMMERCIAL

## 2017-07-26 VITALS
SYSTOLIC BLOOD PRESSURE: 124 MMHG | WEIGHT: 293 LBS | OXYGEN SATURATION: 91 % | HEIGHT: 66 IN | DIASTOLIC BLOOD PRESSURE: 72 MMHG | BODY MASS INDEX: 47.09 KG/M2 | HEART RATE: 94 BPM

## 2017-07-26 DIAGNOSIS — E03.9 HYPOTHYROIDISM, UNSPECIFIED TYPE: ICD-10-CM

## 2017-07-26 DIAGNOSIS — E55.9 VITAMIN D DEFICIENCY: ICD-10-CM

## 2017-07-26 PROCEDURE — 99214 OFFICE O/P EST MOD 30 MIN: CPT | Performed by: INTERNAL MEDICINE

## 2017-07-26 RX ORDER — LEVOTHYROXINE SODIUM 300 UG/1
300 TABLET ORAL DAILY
Qty: 30 TAB | Refills: 3 | Status: SHIPPED | OUTPATIENT
Start: 2017-07-26 | End: 2017-11-14 | Stop reason: SDUPTHER

## 2017-07-26 RX ORDER — HYDROXYCHLOROQUINE SULFATE 200 MG/1
200 TABLET, FILM COATED ORAL 2 TIMES DAILY
COMMUNITY
End: 2017-08-08

## 2017-07-26 NOTE — MR AVS SNAPSHOT
"        Gabriella Clifford   2017 10:10 AM   Office Visit   MRN: [id number]    Department:  Endocrinology [facility]   Dept Phone:  [phone]    Description:  Female : 1965   Provider:  Afshin Guardado M.D.           Allergies as of 2017     Allergen Noted Reactions    Erythromycin 2013       Keflex 2013       Pcn [Penicillins] 2013         You were diagnosed with     Hypothyroidism, unspecified type   [6767552]       Vitamin D deficiency   [[id number]]         Vital Signs     Blood Pressure Pulse Height Weight Body Mass Index Oxygen Saturation    124/72 mmHg 94 1.676 m (5' 6\") 140.343 kg (309 lb 6.4 oz) 49.96 kg/m2 91%    Last Menstrual Period Smoking Status                2016 Former Smoker          Basic Information     Date Of Birth Sex Race Ethnicity Preferred Language    1965 Female White Non- English      Your appointments     2017 10:10 AM   Established Patient with Afshin Guardado M.D.   [facility]    [address], [address]  [place] [address] [phone]           You will be receiving a confirmation call a few days before your appointment from our automated call confirmation system.            Aug 03, 2017  1:30 PM   Follow Up Visit with Maylin Willingham M.D.   [facility] (--)    [address], [address]  [place] [address] [phone]           You will be receiving a confirmation call a few days before your appointment from our automated call confirmation system.            Aug 18, 2017  8:10 PM   Sleep Study Diagnostic with SLEEP TECH   [facility] (--)    [address]  [place] [id number] [phone]            Aug 24, 2017  1:40 PM   Follow UP with VALERIE Recinos   [facility] (--)    [address]  [facility] [address]  [place] [address] [phone]            Oct 04, 2017  2:30 PM   Established " Patient with Afshin Guardado M.D.   [facility] ([facility])    [address], [address]  [place] [address] [phone]           You will be receiving a confirmation call a few days before your appointment from our automated call confirmation system.            Nov 06, 2017  1:00 PM   Established Patient with VALERIE Lopez   [facility]    [address] [address] [phone]           You will be receiving a confirmation call a few days before your appointment from our automated call confirmation system.              Problem List              ICD-10-CM Priority Class Noted - Resolved    Urticaria, chronic L50.8   1/3/2013 - Present    Hypertension I10   1/3/2013 - Present    Asthma in adult J45.909   1/3/2013 - Present    Shoulder pain M25.519   1/3/2013 - Present    Joint pain M25.50   8/27/2013 - Present    Multiple thyroid nodules E04.2   2/2/2014 - Present    Subclinical hyperthyroidism E05.90   7/31/2014 - Present    Graves disease E05.00   2/12/2015 - Present    Urinary urgency R39.15   9/22/2015 - Present    Burning pain R52   9/22/2015 - Present    Chronic cough R05   12/22/2015 - Present    Morbid obesity with BMI of 45.0-49.9, adult (HCC) E66.01, Z68.42   3/14/2017 - Present    Health care maintenance Z00.00   4/24/2017 - Present    DENISE (obstructive sleep apnea)-presumed G47.33   6/22/2017 - Present    Acquired hypothyroidism E03.9   6/22/2017 - Present      Health Maintenance        Date Due Completion Dates    COLONOSCOPY 8/9/2016 ---    PAP SMEAR 3/2/2017 3/2/2014 (Prv Comp)    Override on 3/2/2014: Previously completed    IMM INFLUENZA (1) 9/1/2017 10/15/2014, 10/30/2013    MAMMOGRAM 6/21/2018 6/21/2017, 4/30/2014, 1/17/2013, 1/3/2005 (N/S)    Override on 1/3/2005: (N/S)    IMM DTaP/Tdap/Td Vaccine (2 - Td) 4/24/2027 4/24/2017            Current Immunizations     Influenza TIV (IM) 10/30/2013   7:51 AM    Influenza Vaccine Quad Inj (Pf) 10/15/2014    Pneumococcal polysaccharide vaccine (PPSV-23) 3/14/2017    Tdap Vaccine 4/24/2017      Below and/or attached are the medications your provider expects you to take. Review all of your home medications and newly ordered medications with your provider and/or pharmacist. Follow medication instructions as directed by your provider and/or pharmacist. Please keep your medication list with you and share with your provider. Update the information when medications are discontinued, doses are changed, or new medications (including over-the-counter products) are added; and carry medication information at all times in the event of emergency situations     Allergies:  ERYTHROMYCIN - (reactions not documented)     KEFLEX - (reactions not documented)     PCN - (reactions not documented)               Medications  Valid as of: July 26, 2017 -  9:56 AM    Generic Name Brand Name Tablet Size Instructions for use    Albuterol Sulfate   Inhale  by mouth.        Azelastine-Fluticasone (Suspension) Azelastine-Fluticasone 137-50 MCG/ACT Spray 1 Spray in nose 2 Times a Day.        Ergocalciferol (Cap) DRISDOL 50204 UNITS Take 1 Cap by mouth every 3 days for 56 days.        Hydrocodone-Acetaminophen (Tab) NORCO 5-325 MG Take 1 Tab by mouth every 8 hours as needed.        Hydroxychloroquine Sulfate (Tab) PLAQUENIL 200 MG Take 200 mg by mouth 2 times a day.        Ibuprofen (Tab) MOTRIN 800 MG Take 1 Tab by mouth every 8 hours as needed for Mild Pain.        Levothyroxine Sodium (Tab) SYNTHROID 300 MCG Take 1 Tab by mouth every day.        Losartan Potassium (Tab) COZAAR 100 MG Take 1 Tab by mouth every day.        NIFEdipine (TABLET SR 24 HR) ADALAT CC 60 MG Take 1 Tab by mouth every day.        Tolterodine Tartrate (CAPSULE SR 24 HR) DETROL-LA 2 MG Take 1 Cap by mouth every day.        Zolpidem Tartrate (Tab) AMBIEN 5 MG Take 1 Tab by mouth at bedtime as needed for Sleep (1 to 3 po qhs  prn insomnia/sleep study. Bring to sleep study.).        .                 Medicines prescribed today were sent to:     [id number] - [place], [place] - [address]    [address] [place] [address]    Phone: [phone] Fax: [phone]    Open 24 Hours?: No      Medication refill instructions:       If your prescription bottle indicates you have medication refills left, it is not necessary to call your provider’s office. Please contact your pharmacy and they will refill your medication.    If your prescription bottle indicates you do not have any refills left, you may request refills at any time through one of the following ways: The online [facility] [employer] system (except Urgent Care), by calling your provider’s office, or by asking your pharmacy to contact your provider’s office with a refill request. Medication refills are processed only during regular business hours and may not be available until the next business day. Your provider may request additional information or to have a follow-up visit with you prior to refilling your medication.   *Please Note: Medication refills are assigned a new Rx number when refilled electronically. Your pharmacy may indicate that no refills were authorized even though a new prescription for the same medication is available at the pharmacy. Please request the medicine by name with the pharmacy before contacting your provider for a refill.        Your To Do List     Future Labs/Procedures Complete By Expires    FREE THYROXINE  9/6/2017 7/26/2018    Scheduling Instructions:    To be done after 6 weeks.    TRIIDOTHYRONINE  9/6/2017 7/26/2018    Scheduling Instructions:    To be done after 6 weeks.    Comments:    Free T3    TRIIDOTHYRONINE  9/6/2017 7/26/2018    Scheduling Instructions:    To be done after 6 weeks.    Comments:    Total T3    TSH  9/6/2017 7/26/2018    Scheduling Instructions:    To be done after 6 weeks.         Other Notes About Your Plan     Last UDS:  2/12/15  DR RICCI  Contolled Substance agreement signed:  2/12/15  DR RICCI               MyChart Access Code: Activation code not generated  Current MyChart Status: Active

## 2017-07-26 NOTE — PROGRESS NOTES
Endocrinology Clinic Progress Note  PCP: VALERIE Lopez    HPI:  Gabriella Clifford is a 51 y.o. old patient who comes in today for routine follow up. She has central hypothyroidism. Continues to feel tired and fatigued at all times along with cold intolerance and brittle nails.    ROS:  Constitutional: Fatigue, No unintentional weight loss  Endo: Denies excessive thirst or frequent urination  Integumentary: brittle nails  Other systems were reviewed and are negative    Past Medical History:  Patient Active Problem List    Diagnosis Date Noted   • DENISE (obstructive sleep apnea)-presumed 06/22/2017   • Acquired hypothyroidism 06/22/2017   • Health care maintenance 04/24/2017   • Morbid obesity with BMI of 45.0-49.9, adult ([facility]) 03/14/2017   • Chronic cough 12/22/2015   • Urinary urgency 09/22/2015   • Burning pain 09/22/2015   • Graves disease 02/12/2015   • Subclinical hyperthyroidism 07/31/2014   • Multiple thyroid nodules 02/02/2014   • Joint pain 08/27/2013   • Urticaria, chronic 01/03/2013   • Hypertension 01/03/2013   • Asthma in adult 01/03/2013   • Shoulder pain 01/03/2013       Medications:    Current outpatient prescriptions:   •  hydroxychloroquine (PLAQUENIL) 200 MG Tab, Take 200 mg by mouth 2 times a day., Disp: , Rfl:   •  levothyroxine (SYNTHROID) 300 MCG Tab, Take 1 Tab by mouth every day., Disp: 30 Tab, Rfl: 3  •  vitamin D, Ergocalciferol, (DRISDOL) 59167 UNITS Cap capsule, Take 1 Cap by mouth every 3 days for 56 days., Disp: 18 Cap, Rfl: 0  •  tolterodine ER (DETROL-LA) 2 MG CAPSULE SR 24 HR, Take 1 Cap by mouth every day., Disp: 30 Cap, Rfl: 11  •  losartan (COZAAR) 100 MG Tab, Take 1 Tab by mouth every day., Disp: 30 Tab, Rfl: 2  •  NIFEdipine (ADALAT CC) 60 MG CR tablet, Take 1 Tab by mouth every day., Disp: 90 Tab, Rfl: 3  •  zolpidem (AMBIEN) 5 MG Tab, Take 1 Tab by mouth at bedtime as needed for Sleep (1 to 3 po qhs prn insomnia/sleep study. Bring to sleep study.)., Disp: 3 Tab, Rfl:  "0  •  ibuprofen (MOTRIN) 800 MG Tab, Take 1 Tab by mouth every 8 hours as needed for Mild Pain., Disp: 90 Tab, Rfl: 3  •  hydrocodone-acetaminophen (NORCO) 5-325 MG Tab per tablet, Take 1 Tab by mouth every 8 hours as needed., Disp: 60 Tab, Rfl: 0  •  Azelastine-Fluticasone (DYMISTA) 137-50 MCG/ACT Suspension, Spray 1 Spray in nose 2 Times a Day., Disp: 1 Bottle, Rfl: 0  •  Albuterol Sulfate (PROAIR HFA INH), Inhale  by mouth., Disp: , Rfl:     Labs: Reviewed    Physical Examination:  Vital signs: /72 mmHg  Pulse 94  Ht 1.676 m (5' 6\")  Wt 140.343 kg (309 lb 6.4 oz)  BMI 49.96 kg/m2  SpO2 91%  LMP 09/11/2016 Body mass index is 49.96 kg/(m^2).  General: No apparent distress, cooperative  Eyes: No scleral icterus, no discharge, normal eyelids  Neck: No abnormal masses on inspection, normal thyroid exam  Resp: Normal effort, clear to auscultation bilaterally  CVS: Regular rate and rhythm, S1 S2 normal, no murmur  Extremities: No lower extremity edema  Abdomen: abdominal obesity present  Musculoskeletal: Normal digits and nails  Skin: No rash on visible skin  Psych: Alert and oriented, normal mood and affect, intact memory and able to make informed decisions.    Assessment and Plan:    1. Hypothyroidism, unspecified type  start  - levothyroxine (SYNTHROID) 300 MCG daily.    2. Vitamin D deficiency  She could not tolerate the 50,000 units of vitamin D dose. Advised to take 3301-7137 units daily.      Return in about 2 months (around 9/26/2017).    Thank you for allowing me to participate in the care of this patient.    Afshin Guardado M.D.    CC:   Brenda King, [email or web address].    This note was created using voice recognition software (Dragon). The accuracy of the dictation is limited by the abilities of the software. I have reviewed the note prior to signing, however some errors in grammar and context are still possible. If you have any questions related to this note please do not hesitate to contact our " office.

## 2017-08-03 ENCOUNTER — OFFICE VISIT (OUTPATIENT)
Dept: RHEUMATOLOGY | Facility: PHYSICIAN GROUP | Age: 52
End: 2017-08-03
Payer: COMMERCIAL

## 2017-08-03 VITALS
OXYGEN SATURATION: 94 % | DIASTOLIC BLOOD PRESSURE: 88 MMHG | TEMPERATURE: 99.2 F | HEART RATE: 90 BPM | BODY MASS INDEX: 49.9 KG/M2 | RESPIRATION RATE: 12 BRPM | WEIGHT: 293 LBS | SYSTOLIC BLOOD PRESSURE: 128 MMHG

## 2017-08-03 DIAGNOSIS — M06.00 SERONEGATIVE RHEUMATOID ARTHRITIS (HCC): ICD-10-CM

## 2017-08-03 PROCEDURE — 99213 OFFICE O/P EST LOW 20 MIN: CPT | Performed by: INTERNAL MEDICINE

## 2017-08-03 ASSESSMENT — ENCOUNTER SYMPTOMS
FEVER: 0
CHILLS: 0
TINGLING: 1

## 2017-08-03 NOTE — Clinical Note
[facility]   [address], [address]  [place] [address]  Phone: [phone]  Fax: [phone]              Encounter Date: 8/3/2017    Dear Dr. Clifford,    It was a pleasure seeing your patient, Gabriella Clifford, on 8/3/2017. The encounter diagnosis was Seronegative rheumatoid arthritis (CMS-[facility]).     Please find attached progress note which includes the history I obtained from Ms. Clifford, my physical examination findings, my impression and recommendations.      Once again, it was a pleasure participating in your patient's care.  Please feel free to contact me if you have any questions or if I can be of any further assistance to your patients.      Sincerely,    Maylin Willingham M.D.  Electronically Signed          PROGRESS NOTE:  Subjective:   Date of Consultation:8/3/2017  1:26 PM  Primary care physician:VALERIE Lopez      Reason for Consultation:  Ms. Clifford  is a pleasant 51 y.o. year old female who presented with follow-up of a polyarthralgia        Seronegative rheumatoid arthritis  At last visit we started plaquenil 400  Mg po q day  She has not noticed a difference since starting  4/2017  She states that as the weather changes she notes more stiffness in her hands.  She manages with meloxicam.      Urticaria (autoimmune)  quiescent    Low back pain  Chronic  Since age 21  Prolonged morning stiffness    RHEUM HISTORY:  Ms. Gabriella Clifford first presented to me with a history of autoimmune urticaria and elevated CRP.  She had been previously managed with plaquenil for her autoimmune urticaria as well as histamine blockers.  More recently to her initial visit she reports worsening hand pain and bilateral knee pain.  She had managed with 800 mg ibuprofen.  She also reported 1-2 hours of morning stiffness with swollen joint and stiff hands.  For her evaluation she had positive TPO antibody.  She was started on levothyroxin 6/2017.  In 4/2016 we started her on plaquenil 400  mg po q day.        Pertinent serologies  Rheumatoid factor is negative  Anti-CCP is negative    Results for AIRAM WATSON (MRN [id number]) as of 8/3/2017 13:30   Ref. Range 4/18/2017 15:37 7/11/2017 11:59   Anti-Dna -Ds Latest Ref Range: None Detected  None Detected    Microsomal -Tpo- Abs Latest Ref Range: 0.0-9.0 IU/mL  11.1 (H)   Thyrotropin Receptor Abs Latest Ref Range: <=1.75 IU/L  2.53 (H)   Anti-Scl-70 Latest Ref Range: 0-40 AU/mL 0    Anti-Centromere B Ab Latest Ref Range: 0-40 AU/mL 2    Antinuclear Antibody Latest Ref Range: None Detected  None Detected    Rnp Antibodies Latest Ref Range: 0-40 AU/mL 0    Smith Antibodies Latest Ref Range: 0-40 AU/mL 0    SSA 52 (R0)(SANJUANA) Ab, IgG Latest Ref Range: 0-40 AU/mL 6    SSA 60 (R0)(SANJUANA) Ab, IgG Latest Ref Range: 0-40 AU/mL 14    Sjogren'S Anti-Ss-B Latest Ref Range: 0-40 AU/mL 1    ANCA IgG Latest Ref Range: <1:20  <1:20          Evaluation showed an elevated CRP of 1.76 and a elevated ESR of 20    Hand x-ray from March 16, 2017 showed no evidence of any marginal erosions or periosteal reaction.    Past medical history: Graves, autoimmune urticaria, asthma, high blood pressure diagnosed in 1995.  No miscarriages.  She did have HTN/toxemia during her second pregnancy.  She denies any blood clot history.  Adult onset asthma. She had jaw surgery in middle school. In the 1990s she had tubal ligation. 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      Family history: Rheumatoid arthritis, mom and great aunt had arthritis. First cousin had thyroid Hashimoto's. Father had brain aneurysm.    Social history: Former smoker (social smoker once a week if any), occasional alcohol use. No IV drug use.    Past Medical History   Diagnosis Date   • Urticaria, chronic 1/3/2013   • Hypertension    • Bursitis of shoulder, left    • Rotator cuff tear, left    • Graves disease    • Seasonal allergies    • Rheumatoid  arthritis (CMS-HCC)    • Polish measles    • Chickenpox      Past Surgical History   Procedure Laterality Date   • Rotator cuff repair       left   • Carpal tunnel release     • Tubal coagulation laparoscopic bilateral     • Shoulder surgery     • Cubital tunnel release     • Carpal tunnel release     • Other       jaw surgery     Allergies   Allergen Reactions   • Erythromycin    • Keflex    • Pcn [Penicillins]      Outpatient Encounter Prescriptions as of 8/3/2017   Medication Sig Dispense Refill   • Multiple Vitamins-Minerals (ONE-A-DAY 50 PLUS PO) Take  by mouth.     • hydroxychloroquine (PLAQUENIL) 200 MG Tab Take 200 mg by mouth 2 times a day.     • levothyroxine (SYNTHROID) 300 MCG Tab Take 1 Tab by mouth every day. 30 Tab 3   • tolterodine ER (DETROL-LA) 2 MG CAPSULE SR 24 HR Take 1 Cap by mouth every day. 30 Cap 11   • losartan (COZAAR) 100 MG Tab Take 1 Tab by mouth every day. 30 Tab 2   • NIFEdipine (ADALAT CC) 60 MG CR tablet Take 1 Tab by mouth every day. 90 Tab 3   • zolpidem (AMBIEN) 5 MG Tab Take 1 Tab by mouth at bedtime as needed for Sleep (1 to 3 po qhs prn insomnia/sleep study. Bring to sleep study.). 3 Tab 0   • ibuprofen (MOTRIN) 800 MG Tab Take 1 Tab by mouth every 8 hours as needed for Mild Pain. 90 Tab 3   • hydrocodone-acetaminophen (NORCO) 5-325 MG Tab per tablet Take 1 Tab by mouth every 8 hours as needed. 60 Tab 0   • Azelastine-Fluticasone (DYMISTA) 137-50 MCG/ACT Suspension Spray 1 Spray in nose 2 Times a Day. 1 Bottle 0   • Albuterol Sulfate (PROAIR HFA INH) Inhale  by mouth.       No facility-administered encounter medications on file as of 8/3/2017.       Social History     Social History   • Marital Status:      Spouse Name: N/A   • Number of Children: N/A   • Years of Education: N/A     Occupational History   • Not on file.     Social History Main Topics   • Smoking status: Former Smoker -- 0.25 packs/day     Types: Cigarettes     Quit date: 06/22/2015   • Smokeless  tobacco: Never Used   • Alcohol Use: 0.0 oz/week     0 Standard drinks or equivalent per week      Comment: Occassionally   • Drug Use: No   • Sexual Activity: Yes     Other Topics Concern   • Not on file     Social History Narrative      History   Smoking status   • Former Smoker -- 0.25 packs/day   • Types: Cigarettes   • Quit date: 06/22/2015   Smokeless tobacco   • Never Used     History   Alcohol Use   • 0.0 oz/week   • 0 Standard drinks or equivalent per week     Comment: Occassionally     History   Drug Use No      OB History   No data available      Patient's last menstrual period was 09/11/2016.    G P A L     Family History   Problem Relation Age of Onset   • Arthritis Mother      RA   • Cancer Mother      breast   • Hypertension Father    • Cancer Maternal Grandmother        Review of Systems   Constitutional: Negative for fever and chills.   Musculoskeletal: Positive for joint pain.   Skin: Negative for rash.   Neurological: Positive for tingling.        Tingling on the hands        Objective:   /88 mmHg  Pulse 90  Temp(Src) 37.3 °C (99.2 °F)  Resp 12  Wt 140.161 kg (309 lb)  SpO2 94%  LMP 09/11/2016  Breastfeeding? No    Physical Exam   Constitutional: She is oriented to person, place, and time. She appears well-developed and well-nourished. No distress.   HENT:   Head: Normocephalic and atraumatic.   Right Ear: External ear normal.   Left Ear: External ear normal.   Eyes: Conjunctivae are normal. Right eye exhibits no discharge. Left eye exhibits no discharge.   Pulmonary/Chest: No stridor. No respiratory distress.   Musculoskeletal: She exhibits no edema.   No synovitis appreciated on exam.  No MTP tenderness on exam   Neurological: She is alert and oriented to person, place, and time. No cranial nerve deficit.   Skin: Skin is warm and dry. No rash noted. She is not diaphoretic. No erythema. No pallor.   Psychiatric: She has a normal mood and affect. Her behavior is normal. Judgment and  thought content normal.       Assessment:     1. Seronegative rheumatoid arthritis (CMS-HCC)  DX-LUMBAR SPINE-2 OR 3 VIEWS    DX-SACROILIAC JOINTS 3+    HLA-B27    CBC WITH DIFFERENTIAL    COMP METABOLIC PANEL     Labs:      No results found for: QNTTBGOLD  No results found for: HEPBCORTOT, HEPBCORIGM, HEPBSAG  No results found for: HEPCAB  Lab Results   Component Value Date/Time    SODIUM 135 07/11/2017 11:59 AM    POTASSIUM 3.7 07/11/2017 11:59 AM    CHLORIDE 104 07/11/2017 11:59 AM    CO2 24 07/11/2017 11:59 AM    GLUCOSE 104* 07/11/2017 11:59 AM    BUN 20 07/11/2017 11:59 AM    CREATININE 0.64 07/11/2017 11:59 AM      Lab Results   Component Value Date/Time    WBC 8.6 07/11/2017 11:59 AM    RBC 4.78 07/11/2017 11:59 AM    HEMOGLOBIN 14.2 07/11/2017 11:59 AM    HEMATOCRIT 42.3 07/11/2017 11:59 AM    MCV 88.5 07/11/2017 11:59 AM    MCH 29.7 07/11/2017 11:59 AM    MCHC 33.6 07/11/2017 11:59 AM    MPV 9.4 07/11/2017 11:59 AM    NEUTROPHILS-POLYS 57.80 07/11/2017 11:59 AM    LYMPHOCYTES 32.20 07/11/2017 11:59 AM    MONOCYTES 7.20 07/11/2017 11:59 AM    EOSINOPHILS 2.00 07/11/2017 11:59 AM    BASOPHILS 0.70 07/11/2017 11:59 AM    HYPOCHROMIA 1+ 07/08/2014 09:03 AM      Lab Results   Component Value Date/Time    CALCIUM 9.4 07/11/2017 11:59 AM    AST(SGOT) 12 07/11/2017 11:59 AM    ALT(SGPT) 17 07/11/2017 11:59 AM    ALKALINE PHOSPHATASE 40 07/11/2017 11:59 AM    TOTAL BILIRUBIN 0.4 07/11/2017 11:59 AM    ALBUMIN 4.0 07/11/2017 11:59 AM    TOTAL PROTEIN 7.1 07/11/2017 11:59 AM     Lab Results   Component Value Date/Time    URIC ACID 3.1 04/18/2017 03:37 PM    RHEUMATOID FACTOR -NEPH- <10 03/14/2017 08:14 AM    CCP ANTIBODIES 3 03/14/2017 08:14 AM    ANTINUCLEAR ANTIBODY None Detected 04/18/2017 03:37 PM     Lab Results   Component Value Date/Time    SED RATE DEVI 16 07/11/2017 11:59 AM    STAT C-REACTIVE PROTEIN 1.51* 07/11/2017 11:59 AM     No results found for: MARLON YOUNG  Lab Results   Component  Value Date/Time    C3 COMPLEMENT 184.0 04/18/2017 03:37 PM    COMPLEMENT C4 48.0 04/18/2017 03:37 PM     Lab Results   Component Value Date/Time    ANTI-DNA -DS None Detected 04/18/2017 03:37 PM    RNP ANTIBODIES 0 04/18/2017 03:37 PM    SMITH ANTIBODIES 0 04/18/2017 03:37 PM    ANTI-SCL-70 0 04/18/2017 03:37 PM    ANTI-CENTROMERE B AB 2 04/18/2017 03:37 PM     Lab Results   Component Value Date/Time    ANTI-DNA -DS None Detected 04/18/2017 03:37 PM    ANCA IGG <1:20 04/18/2017 03:37 PM    C3 COMPLEMENT 184.0 04/18/2017 03:37 PM    RNP ANTIBODIES 0 04/18/2017 03:37 PM    SJOGREN'S ANTI-SS-B 1 04/18/2017 03:37 PM     Lab Results   Component Value Date/Time    COLOR Lt. Yellow 04/18/2017 03:37 PM    SPECIFIC GRAVITY 1.021 04/18/2017 03:37 PM    PH 5.5 04/18/2017 03:37 PM    GLUCOSE Negative 04/18/2017 03:37 PM    KETONES Negative 04/18/2017 03:37 PM    PROTEIN Negative 04/18/2017 03:37 PM     No results found for: TOTPROTUR, TOTALVOLUME, HBAJRBEB20  Lab Results   Component Value Date/Time    SSA 60 (R0)(SANJUANA) AB, IGG 14 04/18/2017 03:37 PM    SSA 52 (R0)(SANJUANA) AB, IGG 6 04/18/2017 03:37 PM     Lab Results   Component Value Date/Time    GLYCOHEMOGLOBIN 5.3 01/18/2013 10:59 AM     No results found for: CPKTOTAL  Lab Results   Component Value Date/Time    G-6-PD 12.6 04/18/2017 03:37 PM     No results found for: TPMZ38IHXP  No results found for: ACESERUM  Lab Results   Component Value Date/Time    25-HYDROXY   VITAMIN D 25 26* 05/08/2017 03:17 PM     No results found for: TSH, FREEDIR  Lab Results   Component Value Date/Time    TSH 1.700 07/11/2017 11:59 AM    FREE T-4 0.56 07/11/2017 11:59 AM     Lab Results   Component Value Date/Time    MICROSOMAL -TPO- ABS 11.1* 07/11/2017 11:59 AM    ANTI-THYROGLOBULIN <0.9 01/03/2014 10:40 AM     No results found for: IGGLYMEABS  No results found for: ANTIMITOCHO, FACTIN  No results found for: IGA, TTRANSIGA, ENDOIGA  No results found for: FLTYPE, CRYSTALSBDF  No results found for:  ISTATICAL  No results found for: ISTATCREAT  No results found for: CTELOPEP  No results found for: GBMABG  No results found for: PTHINTACT      Medical Decision Making:  Today's Assessment / Status / Plan:     Seronegative rheumatoid arthritis  Swelling of the MCP appreciated on pictures she has brought in with prolonged history of morning stiffness. She was started on plaquenil 400 mg po q day.  She states she has noted mild improvement.  We will monitor for another 6 months.  I reviewed the side effects of hydroxychloroquine including but not limited to headache, retinal toxicity, skin rash.  I emphasized the importance of baseline and annual follow-up with evaluation with ophthalmology.  We will recheck labs in 6 months    Low back pain  She has features suggestive of an inflammatory pain  Will get lumbar spine xray  Will get sacroiliac xray  Will check HLA B27    Autoimmune urticaria  For now stable      Tingling - peripheral neuropathy???  Will check B12 normal  Folate is normal    1. Seronegative rheumatoid arthritis (CMS-HCC)  DX-LUMBAR SPINE-2 OR 3 VIEWS    DX-SACROILIAC JOINTS 3+    HLA-B27    CBC WITH DIFFERENTIAL    COMP METABOLIC PANEL     Return in about 6 months (around 2/3/2018).      She agreed and verbalized her agreement and understanding with the current plan. I answered all questions and concerns she has at this time and advised her to call at any time in there interim with questions or concerns in regards to her care.    Thank you for allowing me to participate in her care, I will continue to follow closely.

## 2017-08-03 NOTE — PROGRESS NOTES
Subjective:   Date of Consultation:8/3/2017  1:26 PM  Primary care physician:VALERIE Lopez      Reason for Consultation:  Ms. Clifford  is a pleasant 51 y.o. year old female who presented with follow-up of a polyarthralgia        Seronegative rheumatoid arthritis  At last visit we started plaquenil 400  Mg po q day  She has not noticed a difference since starting  4/2017  She states that as the weather changes she notes more stiffness in her hands.  She manages with meloxicam.      Urticaria (autoimmune)  quiescent    Low back pain  Chronic  Since age 21  Prolonged morning stiffness    RHEUM HISTORY:  Ms. Airam Clifford first presented to me with a history of autoimmune urticaria and elevated CRP.  She had been previously managed with plaquenil for her autoimmune urticaria as well as histamine blockers.  More recently to her initial visit she reports worsening hand pain and bilateral knee pain.  She had managed with 800 mg ibuprofen.  She also reported 1-2 hours of morning stiffness with swollen joint and stiff hands.  For her evaluation she had positive TPO antibody.  She was started on levothyroxin 6/2017.  In 4/2016 we started her on plaquenil 400 mg po q day.        Pertinent serologies  Rheumatoid factor is negative  Anti-CCP is negative    Results for AIRAM CLIFFORD (MRN [id number]) as of 8/3/2017 13:30   Ref. Range 4/18/2017 15:37 7/11/2017 11:59   Anti-Dna -Ds Latest Ref Range: None Detected  None Detected    Microsomal -Tpo- Abs Latest Ref Range: 0.0-9.0 IU/mL  11.1 (H)   Thyrotropin Receptor Abs Latest Ref Range: <=1.75 IU/L  2.53 (H)   Anti-Scl-70 Latest Ref Range: 0-40 AU/mL 0    Anti-Centromere B Ab Latest Ref Range: 0-40 AU/mL 2    Antinuclear Antibody Latest Ref Range: None Detected  None Detected    Rnp Antibodies Latest Ref Range: 0-40 AU/mL 0    Smith Antibodies Latest Ref Range: 0-40 AU/mL 0    SSA 52 (R0)(SANJUANA) Ab, IgG Latest Ref Range: 0-40 AU/mL 6    SSA 60 (R0)(SANJUANA) Ab, IgG Latest  Ref Range: 0-40 AU/mL 14    Sjogren'S Anti-Ss-B Latest Ref Range: 0-40 AU/mL 1    ANCA IgG Latest Ref Range: <1:20  <1:20          Evaluation showed an elevated CRP of 1.76 and a elevated ESR of 20    Hand x-ray from March 16, 2017 showed no evidence of any marginal erosions or periosteal reaction.    Past medical history: Graves, autoimmune urticaria, asthma, high blood pressure diagnosed in 1995.  No miscarriages.  She did have HTN/toxemia during her second pregnancy.  She denies any blood clot history.  Adult onset asthma. She had jaw surgery in middle school. In the 1990s she had tubal ligation. 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      Family history: Rheumatoid arthritis, mom and great aunt had arthritis. First cousin had thyroid Hashimoto's. Father had brain aneurysm.    Social history: Former smoker (social smoker once a week if any), occasional alcohol use. No IV drug use.    Past Medical History   Diagnosis Date   • Urticaria, chronic 1/3/2013   • Hypertension    • Bursitis of shoulder, left    • Rotator cuff tear, left    • Graves disease    • Seasonal allergies    • Rheumatoid arthritis (CMS-[facility])    • Latvian measles    • Chickenpox      Past Surgical History   Procedure Laterality Date   • Rotator cuff repair       left   • Carpal tunnel release     • Tubal coagulation laparoscopic bilateral     • Shoulder surgery     • Cubital tunnel release     • Carpal tunnel release     • Other       jaw surgery     Allergies   Allergen Reactions   • Erythromycin    • Keflex    • Pcn [Penicillins]      Outpatient Encounter Prescriptions as of 8/3/2017   Medication Sig Dispense Refill   • Multiple Vitamins-Minerals (ONE-A-DAY 50 PLUS PO) Take  by mouth.     • hydroxychloroquine (PLAQUENIL) 200 MG Tab Take 200 mg by mouth 2 times a day.     • levothyroxine (SYNTHROID) 300 MCG Tab Take 1 Tab by mouth every day. 30 Tab 3   • tolterodine ER (DETROL-LA)  2 MG CAPSULE SR 24 HR Take 1 Cap by mouth every day. 30 Cap 11   • losartan (COZAAR) 100 MG Tab Take 1 Tab by mouth every day. 30 Tab 2   • NIFEdipine (ADALAT CC) 60 MG CR tablet Take 1 Tab by mouth every day. 90 Tab 3   • zolpidem (AMBIEN) 5 MG Tab Take 1 Tab by mouth at bedtime as needed for Sleep (1 to 3 po qhs prn insomnia/sleep study. Bring to sleep study.). 3 Tab 0   • ibuprofen (MOTRIN) 800 MG Tab Take 1 Tab by mouth every 8 hours as needed for Mild Pain. 90 Tab 3   • hydrocodone-acetaminophen (NORCO) 5-325 MG Tab per tablet Take 1 Tab by mouth every 8 hours as needed. 60 Tab 0   • Azelastine-Fluticasone (DYMISTA) 137-50 MCG/ACT Suspension Spray 1 Spray in nose 2 Times a Day. 1 Bottle 0   • Albuterol Sulfate (PROAIR HFA INH) Inhale  by mouth.       No facility-administered encounter medications on file as of 8/3/2017.       Social History     Social History   • Marital Status:      Spouse Name: N/A   • Number of Children: N/A   • Years of Education: N/A     Occupational History   • Not on file.     Social History Main Topics   • Smoking status: Former Smoker -- 0.25 packs/day     Types: Cigarettes     Quit date: 06/22/2015   • Smokeless tobacco: Never Used   • Alcohol Use: 0.0 oz/week     0 Standard drinks or equivalent per week      Comment: Occassionally   • Drug Use: No   • Sexual Activity: Yes     Other Topics Concern   • Not on file     Social History Narrative      History   Smoking status   • Former Smoker -- 0.25 packs/day   • Types: Cigarettes   • Quit date: 06/22/2015   Smokeless tobacco   • Never Used     History   Alcohol Use   • 0.0 oz/week   • 0 Standard drinks or equivalent per week     Comment: Occassionally     History   Drug Use No      OB History   No data available      Patient's last menstrual period was 09/11/2016.    USHA ALEXANDRE     Family History   Problem Relation Age of Onset   • Arthritis Mother      RA   • Cancer Mother      breast   • Hypertension Father    • Cancer Maternal  Grandmother        Review of Systems   Constitutional: Negative for fever and chills.   Musculoskeletal: Positive for joint pain.   Skin: Negative for rash.   Neurological: Positive for tingling.        Tingling on the hands        Objective:   /88 mmHg  Pulse 90  Temp(Src) 37.3 °C (99.2 °F)  Resp 12  Wt 140.161 kg (309 lb)  SpO2 94%  LMP 09/11/2016  Breastfeeding? No    Physical Exam   Constitutional: She is oriented to person, place, and time. She appears well-developed and well-nourished. No distress.   HENT:   Head: Normocephalic and atraumatic.   Right Ear: External ear normal.   Left Ear: External ear normal.   Eyes: Conjunctivae are normal. Right eye exhibits no discharge. Left eye exhibits no discharge.   Pulmonary/Chest: No stridor. No respiratory distress.   Musculoskeletal: She exhibits no edema.   No synovitis appreciated on exam.  No MTP tenderness on exam   Neurological: She is alert and oriented to person, place, and time. No cranial nerve deficit.   Skin: Skin is warm and dry. No rash noted. She is not diaphoretic. No erythema. No pallor.   Psychiatric: She has a normal mood and affect. Her behavior is normal. Judgment and thought content normal.       Assessment:     1. Seronegative rheumatoid arthritis (CMS-HCC)  DX-LUMBAR SPINE-2 OR 3 VIEWS    DX-SACROILIAC JOINTS 3+    HLA-B27    CBC WITH DIFFERENTIAL    COMP METABOLIC PANEL     Labs:      No results found for: QNTTBGOLD  No results found for: HEPBCORTOT, HEPBCORIGM, HEPBSAG  No results found for: HEPCAB  Lab Results   Component Value Date/Time    SODIUM 135 07/11/2017 11:59 AM    POTASSIUM 3.7 07/11/2017 11:59 AM    CHLORIDE 104 07/11/2017 11:59 AM    CO2 24 07/11/2017 11:59 AM    GLUCOSE 104* 07/11/2017 11:59 AM    BUN 20 07/11/2017 11:59 AM    CREATININE 0.64 07/11/2017 11:59 AM      Lab Results   Component Value Date/Time    WBC 8.6 07/11/2017 11:59 AM    RBC 4.78 07/11/2017 11:59 AM    HEMOGLOBIN 14.2 07/11/2017 11:59 AM     HEMATOCRIT 42.3 07/11/2017 11:59 AM    MCV 88.5 07/11/2017 11:59 AM    MCH 29.7 07/11/2017 11:59 AM    MCHC 33.6 07/11/2017 11:59 AM    MPV 9.4 07/11/2017 11:59 AM    NEUTROPHILS-POLYS 57.80 07/11/2017 11:59 AM    LYMPHOCYTES 32.20 07/11/2017 11:59 AM    MONOCYTES 7.20 07/11/2017 11:59 AM    EOSINOPHILS 2.00 07/11/2017 11:59 AM    BASOPHILS 0.70 07/11/2017 11:59 AM    HYPOCHROMIA 1+ 07/08/2014 09:03 AM      Lab Results   Component Value Date/Time    CALCIUM 9.4 07/11/2017 11:59 AM    AST(SGOT) 12 07/11/2017 11:59 AM    ALT(SGPT) 17 07/11/2017 11:59 AM    ALKALINE PHOSPHATASE 40 07/11/2017 11:59 AM    TOTAL BILIRUBIN 0.4 07/11/2017 11:59 AM    ALBUMIN 4.0 07/11/2017 11:59 AM    TOTAL PROTEIN 7.1 07/11/2017 11:59 AM     Lab Results   Component Value Date/Time    URIC ACID 3.1 04/18/2017 03:37 PM    RHEUMATOID FACTOR -NEPH- <10 03/14/2017 08:14 AM    CCP ANTIBODIES 3 03/14/2017 08:14 AM    ANTINUCLEAR ANTIBODY None Detected 04/18/2017 03:37 PM     Lab Results   Component Value Date/Time    SED RATE WESTERGREN 16 07/11/2017 11:59 AM    STAT C-REACTIVE PROTEIN 1.51* 07/11/2017 11:59 AM     No results found for: MARLON YOUNG  Lab Results   Component Value Date/Time    C3 COMPLEMENT 184.0 04/18/2017 03:37 PM    COMPLEMENT C4 48.0 04/18/2017 03:37 PM     Lab Results   Component Value Date/Time    ANTI-DNA -DS None Detected 04/18/2017 03:37 PM    RNP ANTIBODIES 0 04/18/2017 03:37 PM    SMITH ANTIBODIES 0 04/18/2017 03:37 PM    ANTI-SCL-70 0 04/18/2017 03:37 PM    ANTI-CENTROMERE B AB 2 04/18/2017 03:37 PM     Lab Results   Component Value Date/Time    ANTI-DNA -DS None Detected 04/18/2017 03:37 PM    ANCA IGG <1:20 04/18/2017 03:37 PM    C3 COMPLEMENT 184.0 04/18/2017 03:37 PM    RNP ANTIBODIES 0 04/18/2017 03:37 PM    SJOGREN'S ANTI-SS-B 1 04/18/2017 03:37 PM     Lab Results   Component Value Date/Time    COLOR Lt. Yellow 04/18/2017 03:37 PM    SPECIFIC GRAVITY 1.021 04/18/2017 03:37 PM    PH 5.5 04/18/2017  03:37 PM    GLUCOSE Negative 04/18/2017 03:37 PM    KETONES Negative 04/18/2017 03:37 PM    PROTEIN Negative 04/18/2017 03:37 PM     No results found for: TOTPROTUR, TOTALVOLUME, [id number]  Lab Results   Component Value Date/Time    SSA 60 (R0)(SANJUANA) AB, IGG 14 04/18/2017 03:37 PM    SSA 52 (R0)(SANJUANA) AB, IGG 6 04/18/2017 03:37 PM     Lab Results   Component Value Date/Time    GLYCOHEMOGLOBIN 5.3 01/18/2013 10:59 AM     No results found for: CPKTOTAL  Lab Results   Component Value Date/Time    G-6-PD 12.6 04/18/2017 03:37 PM     No results found for: [id number]  No results found for: ACESERUM  Lab Results   Component Value Date/Time    25-HYDROXY   VITAMIN D 25 26* 05/08/2017 03:17 PM     No results found for: TSH, FREEDIR  Lab Results   Component Value Date/Time    TSH 1.700 07/11/2017 11:59 AM    FREE T-4 0.56 07/11/2017 11:59 AM     Lab Results   Component Value Date/Time    MICROSOMAL -TPO- ABS 11.1* 07/11/2017 11:59 AM    ANTI-THYROGLOBULIN <0.9 01/03/2014 10:40 AM     No results found for: IGGLYMEABS  No results found for: ANTIMITOCHO, FACTIN  No results found for: IGA, TTRANSIGA, ENDOIGA  No results found for: FLTYPE, CRYSTALSBDF  No results found for: ISTATICAL  No results found for: ISTATCREAT  No results found for: CTELOPEP  No results found for: GBMABG  No results found for: PTHINTACT      Medical Decision Making:  Today's Assessment / Status / Plan:     Seronegative rheumatoid arthritis  Swelling of the MCP appreciated on pictures she has brought in with prolonged history of morning stiffness. She was started on plaquenil 400 mg po q day.  She states she has noted mild improvement.  We will monitor for another 6 months.  I reviewed the side effects of hydroxychloroquine including but not limited to headache, retinal toxicity, skin rash.  I emphasized the importance of baseline and annual follow-up with evaluation with ophthalmology.  We will recheck labs in 6 months    Low back pain  She has features  suggestive of an inflammatory pain  Will get lumbar spine xray  Will get sacroiliac xray  Will check HLA B27    Autoimmune urticaria  For now stable      Tingling - peripheral neuropathy???  Will check B12 normal  Folate is normal    1. Seronegative rheumatoid arthritis (CMS-HCC)  DX-LUMBAR SPINE-2 OR 3 VIEWS    DX-SACROILIAC JOINTS 3+    HLA-B27    CBC WITH DIFFERENTIAL    COMP METABOLIC PANEL     Return in about 6 months (around 2/3/2018).      She agreed and verbalized her agreement and understanding with the current plan. I answered all questions and concerns she has at this time and advised her to call at any time in there interim with questions or concerns in regards to her care.    Thank you for allowing me to participate in her care, I will continue to follow closely.

## 2017-08-03 NOTE — MR AVS SNAPSHOT
Gabriella Clifford   8/3/2017 1:30 PM   Office Visit   MRN: [id number]    Department:  Rheumatology [facility]   Dept Phone:  [phone]    Description:  Female : 1965   Provider:  Maylin Willingham M.D.           Reason for Visit     Follow-Up           Allergies as of 8/3/2017     Allergen Noted Reactions    Erythromycin 2013       Keflex 2013       Pcn [Penicillins] 2013         You were diagnosed with     Seronegative rheumatoid arthritis (CMS-HCC)   [106498]         Vital Signs     Blood Pressure Pulse Temperature Respirations Weight Oxygen Saturation    128/88 mmHg 90 37.3 °C (99.2 °F) 12 140.161 kg (309 lb) 94%    Last Menstrual Period Breastfeeding? Smoking Status             2016 No Former Smoker         Basic Information     Date Of Birth Sex Race Ethnicity Preferred Language    1965 Female White Non- English      Your appointments     Aug 18, 2017  8:10 PM   Sleep Study Diagnostic with SLEEP TECH   [facility] (--)    [address] [id number] [phone]            Aug 24, 2017  1:40 PM   Follow UP with VALERIE Recinos   [facility] (--)    [address] [id number] [phone]            Oct 04, 2017  2:30 PM   Established Patient with Afshin Guardado M.D.   [facility]    [address]  [place] [address] [phone]           You will be receiving a confirmation call a few days before your appointment from our automated call confirmation system.            2017  1:00 PM   Established Patient with VALERIE Lopez   [facility]    [address] [address] [phone]           You will be receiving a confirmation call a few days before your appointment from our automated call confirmation system.            2018   2:00 PM   Follow Up Visit with Maylin Willingham M.D.   [facility] (--)    [address], [address]  [place] [address] [phone]           You will be receiving a confirmation call a few days before your appointment from our automated call confirmation system.              Problem List              ICD-10-CM Priority Class Noted - Resolved    Urticaria, chronic L50.8   1/3/2013 - Present    Hypertension I10   1/3/2013 - Present    Asthma in adult J45.909   1/3/2013 - Present    Shoulder pain M25.519   1/3/2013 - Present    Joint pain M25.50   8/27/2013 - Present    Multiple thyroid nodules E04.2   2/2/2014 - Present    Subclinical hyperthyroidism E05.90   7/31/2014 - Present    Graves disease E05.00   2/12/2015 - Present    Urinary urgency R39.15   9/22/2015 - Present    Burning pain R52   9/22/2015 - Present    Chronic cough R05   12/22/2015 - Present    Morbid obesity with BMI of 45.0-49.9, adult (HCC) E66.01, Z68.42   3/14/2017 - Present    Health care maintenance Z00.00   4/24/2017 - Present    DENISE (obstructive sleep apnea)-presumed G47.33   6/22/2017 - Present    Acquired hypothyroidism E03.9   6/22/2017 - Present      Health Maintenance        Date Due Completion Dates    COLONOSCOPY 8/9/2016 ---    PAP SMEAR 3/2/2017 3/2/2014 (Prv Comp)    Override on 3/2/2014: Previously completed    IMM INFLUENZA (1) 9/1/2017 10/15/2014, 10/30/2013    MAMMOGRAM 6/21/2018 6/21/2017, 4/30/2014, 1/17/2013, 1/3/2005 (N/S)    Override on 1/3/2005: (N/S)    IMM DTaP/Tdap/Td Vaccine (2 - Td) 4/24/2027 4/24/2017            Current Immunizations     Influenza TIV (IM) 10/30/2013  7:51 AM    Influenza Vaccine Quad Inj (Pf) 10/15/2014    Pneumococcal polysaccharide vaccine (PPSV-23) 3/14/2017    Tdap Vaccine 4/24/2017      Below and/or attached are the medications your provider expects you to take. Review all of your home medications and newly ordered medications with your provider and/or pharmacist. Follow  medication instructions as directed by your provider and/or pharmacist. Please keep your medication list with you and share with your provider. Update the information when medications are discontinued, doses are changed, or new medications (including over-the-counter products) are added; and carry medication information at all times in the event of emergency situations     Allergies:  ERYTHROMYCIN - (reactions not documented)     KEFLEX - (reactions not documented)     PCN - (reactions not documented)               Medications  Valid as of: August 03, 2017 -  1:55 PM    Generic Name Brand Name Tablet Size Instructions for use    Albuterol Sulfate   Inhale  by mouth.        Azelastine-Fluticasone (Suspension) Azelastine-Fluticasone 137-50 MCG/ACT Spray 1 Spray in nose 2 Times a Day.        Hydrocodone-Acetaminophen (Tab) NORCO 5-325 MG Take 1 Tab by mouth every 8 hours as needed.        Hydroxychloroquine Sulfate (Tab) PLAQUENIL 200 MG Take 200 mg by mouth 2 times a day.        Ibuprofen (Tab) MOTRIN 800 MG Take 1 Tab by mouth every 8 hours as needed for Mild Pain.        Levothyroxine Sodium (Tab) SYNTHROID 300 MCG Take 1 Tab by mouth every day.        Losartan Potassium (Tab) COZAAR 100 MG Take 1 Tab by mouth every day.        Multiple Vitamins-Minerals   Take  by mouth.        NIFEdipine (TABLET SR 24 HR) ADALAT CC 60 MG Take 1 Tab by mouth every day.        Tolterodine Tartrate (CAPSULE SR 24 HR) DETROL-LA 2 MG Take 1 Cap by mouth every day.        Zolpidem Tartrate (Tab) AMBIEN 5 MG Take 1 Tab by mouth at bedtime as needed for Sleep (1 to 3 po qhs prn insomnia/sleep study. Bring to sleep study.).        .                 Medicines prescribed today were sent to:     [id number] - [place], [place] - [address]    [address] [place] [address]    Phone: [phone] Fax: [phone]    Open 24 Hours?: No      Medication refill instructions:       If  your prescription bottle indicates you have medication refills left, it is not necessary to call your provider’s office. Please contact your pharmacy and they will refill your medication.    If your prescription bottle indicates you do not have any refills left, you may request refills at any time through one of the following ways: The online [employer] system (except Urgent Care), by calling your provider’s office, or by asking your pharmacy to contact your provider’s office with a refill request. Medication refills are processed only during regular business hours and may not be available until the next business day. Your provider may request additional information or to have a follow-up visit with you prior to refilling your medication.   *Please Note: Medication refills are assigned a new Rx number when refilled electronically. Your pharmacy may indicate that no refills were authorized even though a new prescription for the same medication is available at the pharmacy. Please request the medicine by name with the pharmacy before contacting your provider for a refill.        Your To Do List     Future Labs/Procedures Complete By Expires    CBC WITH DIFFERENTIAL  1/1/2018 8/3/2018    COMP METABOLIC PANEL  1/1/2018 8/3/2018    HLA-B27  1/1/2018 8/3/2018    DX-LUMBAR SPINE-2 OR 3 VIEWS  As directed 8/3/2018    DX-SACROILIAC JOINTS 3+  As directed 8/3/2018      Other Notes About Your Plan     Last UDS:  2/12/15  DR RICCI  Contolled Substance agreement signed:  2/12/15  DR KEIRY Elt Access Code: Activation code not generated  Current [employer] Status: Active

## 2017-08-12 ENCOUNTER — PATIENT MESSAGE (OUTPATIENT)
Dept: RHEUMATOLOGY | Facility: PHYSICIAN GROUP | Age: 52
End: 2017-08-12

## 2017-08-14 ENCOUNTER — PATIENT MESSAGE (OUTPATIENT)
Dept: RHEUMATOLOGY | Facility: PHYSICIAN GROUP | Age: 52
End: 2017-08-14

## 2017-08-14 DIAGNOSIS — M25.541 ARTHRALGIA OF HANDS, BILATERAL: ICD-10-CM

## 2017-08-14 DIAGNOSIS — M25.542 ARTHRALGIA OF HANDS, BILATERAL: ICD-10-CM

## 2017-08-15 ENCOUNTER — HOSPITAL ENCOUNTER (OUTPATIENT)
Dept: RADIOLOGY | Facility: MEDICAL CENTER | Age: 52
End: 2017-08-15
Attending: INTERNAL MEDICINE
Payer: COMMERCIAL

## 2017-08-15 DIAGNOSIS — M06.00 SERONEGATIVE RHEUMATOID ARTHRITIS (HCC): ICD-10-CM

## 2017-08-15 PROCEDURE — 72100 X-RAY EXAM L-S SPINE 2/3 VWS: CPT

## 2017-08-15 PROCEDURE — 72202 X-RAY EXAM SI JOINTS 3/> VWS: CPT

## 2017-08-18 ENCOUNTER — SLEEP STUDY (OUTPATIENT)
Dept: SLEEP MEDICINE | Facility: MEDICAL CENTER | Age: 52
End: 2017-08-18
Attending: NURSE PRACTITIONER
Payer: COMMERCIAL

## 2017-08-18 DIAGNOSIS — G47.33 OSA (OBSTRUCTIVE SLEEP APNEA): ICD-10-CM

## 2017-08-18 PROCEDURE — 95811 POLYSOM 6/>YRS CPAP 4/> PARM: CPT | Performed by: INTERNAL MEDICINE

## 2017-08-21 NOTE — PROCEDURES
CLINICAL COMMENTS:  The patient underwent a split night polysomnogram with a CPAP titration using the standard montage for measurement of parameters of sleep, respiratory events, movement abnormalities, heart rate and rhythm. A microphone was used to monitor snoring.    ANALYSIS: The diagnostic recording time was 238.0 minutes with a sleep period of 235.7 minutes.  Total sleep time was 126.5 minutes with a sleep efficiency of 53.2%.  The sleep latency was 2.2 minutes, and REM latency was N/A minutes.  The patient had 200 arousals in total, for an arousal index of 94.9.        RESPIRATORY: The patient had 74 apneas in total.  Of these, 26 were obstructive apneas, and 45 were central apneas.  This resulted in an apnea index (AI) of 35.1.  The patient had 224 hypopneas in total, which resulted in a hypopnea index of 106.2.  The overall AHI was 141.3, while the AHI during REM was N/A.  The supine AHI = 156.5.    OXIMETRY: Oxygen saturation monitoring showed a mean SpO2 of 81.8% for the diagnostic part of the study, with a minimum oxygen saturation of 0.0%.  Oxygen saturations were below 89% for 104.0% of sleep time.    CARDIAC: The highest heart rate for the first part of the study was 265.0 beats per minute.  The average heart rate during sleep was 81.2 bpm, while the highest heart rate was 99.0 bpm.    LIMB MOVEMENTS: There were a total of 0 periodic limb movements during sleep, of which 0 were PLMS arousals.  This resulted in a PLMS index of 0.0 and a PLMS arousal index of 0.0.    TREATMENT    Treatment recording time was 204.1 minutes with a total sleep time of 169.0min.  The patient had an arousal index of 18.8.      RESPIRATORY: The patient had 8 obstructive apneas, 14 central apneas, and 81 hypopneas for an overall AHI was 36.6.    OXIMETRY: The mean SpO2 during treatment was 87.9%, with a minimum oxygen saturation of 82.0%.      Interpretation:    This is a split-night study.    In the diagnostic phase there  is fragmentation of sleep with increased wake after sleep onset time and a marked elevation in the arousal and awakening index.  No REM sleep time is recorded.  Sleep onset latency is short, suggesting sleep deprivation.  No periodic limb movements are identified.  The apnea hypopnea index is 141.3 events per hour, including primarily hypopnea episodes with central apnea and obstructive apnea episodes as well.  The lowest arterial oxygen saturation was 76% on room air and she spent 73% of the diagnostic time with a saturation below 90%.    In the treatment phase of the study there is some improvement in sleep continuity.  Periodic limb movements are identified but do not affect sleep continuity.  CPAP was adjusted across a pressure range of 5-18 cm water with persistence of hypopnea episodes associated hypoxemia and occasional central apneas as well.    Assessment:  Very severe obstructive sleep apnea hypopnea with an apnea hypopnea index of 141.3 events per hour.  Severe nocturnal hypoxemia with a lowest arterial oxygen saturation of 76% on room air.  Fragmentation of sleep related in part to the breathing events and improved on treatment.  There is a significant but incomplete response to CPAP therapy across a broad range of expiratory pressures.    Recommendations:  Follow-up polysomnography dedicated to further titration of therapy.  She may require BiPAP ventilation.

## 2017-08-24 ENCOUNTER — SLEEP CENTER VISIT (OUTPATIENT)
Dept: SLEEP MEDICINE | Facility: MEDICAL CENTER | Age: 52
End: 2017-08-24
Payer: COMMERCIAL

## 2017-08-24 VITALS
SYSTOLIC BLOOD PRESSURE: 118 MMHG | OXYGEN SATURATION: 94 % | HEART RATE: 91 BPM | WEIGHT: 293 LBS | RESPIRATION RATE: 15 BRPM | HEIGHT: 66 IN | BODY MASS INDEX: 47.09 KG/M2 | DIASTOLIC BLOOD PRESSURE: 85 MMHG

## 2017-08-24 DIAGNOSIS — G47.33 OSA (OBSTRUCTIVE SLEEP APNEA): ICD-10-CM

## 2017-08-24 DIAGNOSIS — E66.01 MORBID OBESITY WITH BMI OF 45.0-49.9, ADULT (HCC): ICD-10-CM

## 2017-08-24 DIAGNOSIS — I10 ESSENTIAL HYPERTENSION: ICD-10-CM

## 2017-08-24 PROCEDURE — 99213 OFFICE O/P EST LOW 20 MIN: CPT | Performed by: NURSE PRACTITIONER

## 2017-08-24 NOTE — PATIENT INSTRUCTIONS
1. Titration study ASAP  2. Please bring Ambien with you for the night of your study  3. Follow up after testing to review results

## 2017-08-24 NOTE — PROGRESS NOTES
Chief Complaint   Patient presents with   • Results     SS         HPI: This patient is a 52 y.o. female, who presents for sleep study results. She was recently referred by her PCP for evaluation suspected. Medical history includes obesity, BMI 50, seasonal allergies, mild intermittent asthma, chronic urticaria, history Graves disease now hypothyroid followed by endocrinology, HTN, currently being worked up for possible RA.    Her sleep symptoms have been worse for the past 6 months. Her  notes loud snoring and witnessed apneas. Patient does not seem to be bothered by this. She typically gets up 2-3 times per night to use the bathroom. Occasionally has a resuscitative gasps. She notes mild fatigue but attributes this to her multiple medical conditions. She denies a.m. headaches. She remembers one evening waking with palpitations, taking her blood pressure noticing it was elevated. Symptoms resolved after a while. This is not a routine occurrence, she attributed it to her Graves' disease.    PSG August 18, 2017 indicates poor sleep efficiency of 53%, severe sleep apnea with an AHI of 141, these were primarily hypopneas. Minimum saturation 81%. She was initiated on CPAP 5 cm H2O and titrated up to 18 cm H2O with no clearcut endpoint. She requires dedicated night titration. Testing reviewed with the patient and her  today.    Past Medical History   Diagnosis Date   • Urticaria, chronic 1/3/2013   • Hypertension    • Bursitis of shoulder, left    • Rotator cuff tear, left    • Graves disease    • Seasonal allergies    • Rheumatoid arthritis (CMS-[facility])    • Vietnamese measles    • Chickenpox        Social History   Substance Use Topics   • Smoking status: Former Smoker -- 0.25 packs/day     Types: Cigarettes     Quit date: 06/22/2015   • Smokeless tobacco: Never Used   • Alcohol Use: 0.0 oz/week     0 Standard drinks or equivalent per week      Comment: Occassionally       Family History   Problem Relation Age  "of Onset   • Arthritis Mother      RA   • Cancer Mother      breast   • Hypertension Father    • Cancer Maternal Grandmother        Current medications as of today   Current Outpatient Prescriptions   Medication Sig Dispense Refill   • Diclofenac Sodium (VOLTAREN) 1 % Gel Apply 1 Application to skin as directed 2 times a day as needed. 100 g 2   • hydroxychloroquine (PLAQUENIL) 200 MG Tab TAKE 1 TABLET BY MOUTH TWICE DAILY 60 Tab 5   • Multiple Vitamins-Minerals (ONE-A-DAY 50 PLUS PO) Take  by mouth.     • levothyroxine (SYNTHROID) 300 MCG Tab Take 1 Tab by mouth every day. 30 Tab 3   • tolterodine ER (DETROL-LA) 2 MG CAPSULE SR 24 HR Take 1 Cap by mouth every day. 30 Cap 11   • losartan (COZAAR) 100 MG Tab Take 1 Tab by mouth every day. 30 Tab 2   • ibuprofen (MOTRIN) 800 MG Tab Take 1 Tab by mouth every 8 hours as needed for Mild Pain. 90 Tab 3   • hydrocodone-acetaminophen (NORCO) 5-325 MG Tab per tablet Take 1 Tab by mouth every 8 hours as needed. 60 Tab 0   • NIFEdipine (ADALAT CC) 60 MG CR tablet Take 1 Tab by mouth every day. 90 Tab 3   • Azelastine-Fluticasone (DYMISTA) 137-50 MCG/ACT Suspension Spray 1 Spray in nose 2 Times a Day. 1 Bottle 0   • Albuterol Sulfate (PROAIR HFA INH) Inhale  by mouth.     • zolpidem (AMBIEN) 5 MG Tab Take 1 Tab by mouth at bedtime as needed for Sleep (1 to 3 po qhs prn insomnia/sleep study. Bring to sleep study.). 3 Tab 0     No current facility-administered medications for this visit.       Allergies: Erythromycin; Keflex; and Pcn    Blood pressure 118/85, pulse 91, resp. rate 15, height 1.676 m (5' 5.98\"), weight 138.347 kg (305 lb), last menstrual period 09/11/2016, SpO2 94 %.      ROS:   Constitutional: Denies fevers, chills, night sweats, weight lossPositive mild fatigue.  Eyes: Denies pain, discharge/drainage  ENT: Denies tinnitus, hearing loss, sinusitis, hoarseness, epistaxis  Allergic: Denies Allergic rhinitis or hayfever  Respiratory: Denies cough, wheeze, dyspnea, " hemoptysis  Cardiovascular: Denies chest pain, tightness, palpitations, orthopnea or edema  Sleep: See HPI  Neurological: Denies vertigo or headaches  Skin: Denies rashes, lesions  Psychiatric: Denies depression or anxiety    Physical exam:   Constitutional: Obese, in no acute distress  Eyes: PERRLA, grave's appearance  Neck: supple, no masses  Respiratory: no intercostal retractions or accessory muscle use   Lungs auscultation: Clear to auscultation bilaterally  Cardiovascular: Regular rate rhythm no murmurs, rubs or gallops  Musculoskeletal: Normal gait, no clubbing or cyanosis  Skin: No rashes or lesions  Neuro: No focal deficit, cranial nerves grossly intact  Psychiatric: Oriented to time, person and place.     Diagnosis:  1. Essential hypertension     2. Morbid obesity with BMI of 45.0-49.9, adult (HCC)     3. DENISE (obstructive sleep apnea)-presumed  POLYSOMNOGRAPHY TITRATION       Plan:  1. Dedicated night titration to higher pressures of CPAP, patient will likely require BiPAP. No evidence of central apnea.  2. Patient has Ambien she will bring with her for her study  3. Follow up after testing to review

## 2017-08-24 NOTE — MR AVS SNAPSHOT
"        Gabriella Clifford   2017 1:40 PM   Sleep Center Visit   MRN: [id number]    Department:  [facility]   Dept Phone:  [phone]    Description:  Female : 1965   Provider:  VALERIE Recinos           Reason for Visit     Results SS      Allergies as of 2017     Allergen Noted Reactions    Erythromycin 2013       Keflex 2013       Pcn [Penicillins] 2013         You were diagnosed with     Essential hypertension   [0974072]       Morbid obesity with BMI of 45.0-49.9, adult (CMS-[facility])   [182577]       DENISE (obstructive sleep apnea)   [328030]         Vital Signs     Blood Pressure Pulse Respirations Height Weight Body Mass Index    118/85 mmHg 91 15 1.676 m (5' 5.98\") 138.347 kg (305 lb) 49.25 kg/m2    Oxygen Saturation Last Menstrual Period Smoking Status             94% 2016 Former Smoker         Basic Information     Date Of Birth Sex Race Ethnicity Preferred Language    1965 Female White Non- English      Your appointments     Oct 04, 2017  2:30 PM   Established Patient with Afshin Guardado M.D.   [facility]    [address], [address]  [facility] [address] [phone]           You will be receiving a confirmation call a few days before your appointment from our automated call confirmation system.            Oct 27, 2017  8:10 PM   Sleep Study with SLEEP TECH   [facility] (--)    [address] [place] [address] A  [facility] [id number] [phone]            2017  1:00 PM   Established Patient with VALERIE Lopez   [facility]    [address] [facility] [address] [phone]           You will be receiving a confirmation call a few days before your appointment from our automated call confirmation system.            Nov 10, 2017  2:40 PM   Follow UP with VALERIE Recinos   [facility] (--)    [address]  [address]  [place] [id number] [phone]            Feb 06, 2018  2:00 PM   Follow Up Visit with Maylin Willingham M.D.   [facility] (--)    [address]  [place] [id number] [phone]           You will be receiving a confirmation call a few days before your appointment from our automated call confirmation system.              Problem List              ICD-10-CM Priority Class Noted - Resolved    Urticaria, chronic L50.8   1/3/2013 - Present    Hypertension I10   1/3/2013 - Present    Asthma in adult J45.909   1/3/2013 - Present    Shoulder pain M25.519   1/3/2013 - Present    Joint pain M25.50   8/27/2013 - Present    Multiple thyroid nodules E04.2   2/2/2014 - Present    Subclinical hyperthyroidism E05.90   7/31/2014 - Present    Graves disease E05.00   2/12/2015 - Present    Urinary urgency R39.15   9/22/2015 - Present    Burning pain R52   9/22/2015 - Present    Chronic cough R05   12/22/2015 - Present    Morbid obesity with BMI of 45.0-49.9, adult (HCC) E66.01, Z68.42   3/14/2017 - Present    Health care maintenance Z00.00   4/24/2017 - Present    DENISE (obstructive sleep apnea)-presumed G47.33   6/22/2017 - Present    Acquired hypothyroidism E03.9   6/22/2017 - Present      Health Maintenance        Date Due Completion Dates    COLONOSCOPY 8/9/2016 ---    PAP SMEAR 3/2/2017 3/2/2014 (Prv Comp)    Override on 3/2/2014: Previously completed    IMM INFLUENZA (1) 9/1/2017 10/15/2014, 10/30/2013    MAMMOGRAM 6/21/2018 6/21/2017, 4/30/2014, 1/17/2013, 1/3/2005 (N/S)    Override on 1/3/2005: (N/S)    IMM DTaP/Tdap/Td Vaccine (2 - Td) 4/24/2027 4/24/2017            Current Immunizations     Influenza TIV (IM) 10/30/2013  7:51 AM    Influenza Vaccine Quad Inj (Pf) 10/15/2014    Pneumococcal polysaccharide vaccine (PPSV-23) 3/14/2017    Tdap Vaccine 4/24/2017      Below and/or attached are the medications your provider expects you to take. Review  all of your home medications and newly ordered medications with your provider and/or pharmacist. Follow medication instructions as directed by your provider and/or pharmacist. Please keep your medication list with you and share with your provider. Update the information when medications are discontinued, doses are changed, or new medications (including over-the-counter products) are added; and carry medication information at all times in the event of emergency situations     Allergies:  ERYTHROMYCIN - (reactions not documented)     KEFLEX - (reactions not documented)     PCN - (reactions not documented)               Medications  Valid as of: August 24, 2017 -  2:04 PM    Generic Name Brand Name Tablet Size Instructions for use    Albuterol Sulfate   Inhale  by mouth.        Azelastine-Fluticasone (Suspension) Azelastine-Fluticasone 137-50 MCG/ACT Spray 1 Spray in nose 2 Times a Day.        Diclofenac Sodium (Gel) Diclofenac Sodium 1 % Apply 1 Application to skin as directed 2 times a day as needed.        Hydrocodone-Acetaminophen (Tab) NORCO 5-325 MG Take 1 Tab by mouth every 8 hours as needed.        Hydroxychloroquine Sulfate (Tab) PLAQUENIL 200 MG TAKE 1 TABLET BY MOUTH TWICE DAILY        Ibuprofen (Tab) MOTRIN 800 MG Take 1 Tab by mouth every 8 hours as needed for Mild Pain.        Levothyroxine Sodium (Tab) SYNTHROID 300 MCG Take 1 Tab by mouth every day.        Losartan Potassium (Tab) COZAAR 100 MG Take 1 Tab by mouth every day.        Multiple Vitamins-Minerals   Take  by mouth.        NIFEdipine (TABLET SR 24 HR) ADALAT CC 60 MG Take 1 Tab by mouth every day.        Tolterodine Tartrate (CAPSULE SR 24 HR) DETROL-LA 2 MG Take 1 Cap by mouth every day.        Zolpidem Tartrate (Tab) AMBIEN 5 MG Take 1 Tab by mouth at bedtime as needed for Sleep (1 to 3 po qhs prn insomnia/sleep study. Bring to sleep study.).        .                 Medicines prescribed today were sent to:     [id number] -  [place], [place] - [address] [facility] [address] [facility] [address] [facility]    [address] [place] [address]    Phone: [phone] Fax: [phone]    Open 24 Hours?: No      Medication refill instructions:       If your prescription bottle indicates you have medication refills left, it is not necessary to call your provider’s office. Please contact your pharmacy and they will refill your medication.    If your prescription bottle indicates you do not have any refills left, you may request refills at any time through one of the following ways: The online [facility] system (except Urgent Care), by calling your provider’s office, or by asking your pharmacy to contact your provider’s office with a refill request. Medication refills are processed only during regular business hours and may not be available until the next business day. Your provider may request additional information or to have a follow-up visit with you prior to refilling your medication.   *Please Note: Medication refills are assigned a new Rx number when refilled electronically. Your pharmacy may indicate that no refills were authorized even though a new prescription for the same medication is available at the pharmacy. Please request the medicine by name with the pharmacy before contacting your provider for a refill.        Your To Do List     Future Labs/Procedures Complete By Expires    POLYSOMNOGRAPHY TITRATION  As directed 8/24/2018      Instructions    1. Titration study ASAP  2. Please bring Ambien with you for the night of your study  3. Follow up after testing to review results       Other Notes About Your Plan     Last UDS:  2/12/15  DR RICCI  Contolled Substance agreement signed:  2/12/15  DR KEIRY Elt Access Code: Activation code not generated  Current [facility] Status: Active

## 2017-08-31 ENCOUNTER — TELEPHONE (OUTPATIENT)
Dept: SLEEP MEDICINE | Facility: MEDICAL CENTER | Age: 52
End: 2017-08-31

## 2017-08-31 NOTE — TELEPHONE ENCOUNTER
----- Message from VALERIE Recinos sent at 8/29/2017  4:30 PM PDT -----  Please get patient scheduled for visit to review results I opened time on Sept 11th

## 2017-09-06 ENCOUNTER — HOSPITAL ENCOUNTER (OUTPATIENT)
Dept: LAB | Facility: MEDICAL CENTER | Age: 52
End: 2017-09-06
Attending: INTERNAL MEDICINE
Payer: COMMERCIAL

## 2017-09-06 DIAGNOSIS — E03.9 HYPOTHYROIDISM, UNSPECIFIED TYPE: ICD-10-CM

## 2017-09-06 LAB
T3 SERPL-MCNC: 140.1 NG/DL (ref 60–181)
T3FREE SERPL-MCNC: 4.57 PG/ML (ref 2.4–4.2)
T4 FREE SERPL-MCNC: 1.69 NG/DL (ref 0.53–1.43)
TSH SERPL DL<=0.005 MIU/L-ACNC: 0.28 UIU/ML (ref 0.3–3.7)

## 2017-09-06 PROCEDURE — 84439 ASSAY OF FREE THYROXINE: CPT

## 2017-09-06 PROCEDURE — 36415 COLL VENOUS BLD VENIPUNCTURE: CPT

## 2017-09-06 PROCEDURE — 84481 FREE ASSAY (FT-3): CPT

## 2017-09-06 PROCEDURE — 84443 ASSAY THYROID STIM HORMONE: CPT

## 2017-09-06 PROCEDURE — 84480 ASSAY TRIIODOTHYRONINE (T3): CPT

## 2017-09-08 ENCOUNTER — OFFICE VISIT (OUTPATIENT)
Dept: ENDOCRINOLOGY | Facility: MEDICAL CENTER | Age: 52
End: 2017-09-08
Payer: COMMERCIAL

## 2017-09-08 VITALS
SYSTOLIC BLOOD PRESSURE: 126 MMHG | DIASTOLIC BLOOD PRESSURE: 84 MMHG | OXYGEN SATURATION: 96 % | BODY MASS INDEX: 47.09 KG/M2 | HEIGHT: 66 IN | WEIGHT: 293 LBS | HEART RATE: 98 BPM

## 2017-09-08 DIAGNOSIS — E03.9 HYPOTHYROIDISM, UNSPECIFIED TYPE: ICD-10-CM

## 2017-09-08 DIAGNOSIS — E55.9 VITAMIN D DEFICIENCY: ICD-10-CM

## 2017-09-08 PROCEDURE — 99213 OFFICE O/P EST LOW 20 MIN: CPT | Performed by: INTERNAL MEDICINE

## 2017-09-08 NOTE — PROGRESS NOTES
Endocrinology Clinic Progress Note  PCP: VALERIE Lopez    HPI:  Gabriella Clifford is a 52 y.o. old patient who comes in today for routine follow up. For  hypothyroidism she is currently on 300 µg of Synthroid. Continues to feels tired and sluggish. She denies any signs or symptoms consistent with hyperthyroidism.    She also underwent a sleep study and she does have sleep apnea. She will be initiating the CPAP therapy very shortly.    ROS:  Constitutional: Fatigue, No unintentional weight loss  Endo: Denies excessive thirst or frequent urination  All other systems were reviewed and were negative.    Past Medical History:  Patient Active Problem List    Diagnosis Date Noted   • DENISE (obstructive sleep apnea)-presumed 06/22/2017   • Acquired hypothyroidism 06/22/2017   • Health care maintenance 04/24/2017   • Morbid obesity with BMI of 45.0-49.9, adult ([facility]) 03/14/2017   • Chronic cough 12/22/2015   • Urinary urgency 09/22/2015   • Burning pain 09/22/2015   • Graves disease 02/12/2015   • Subclinical hyperthyroidism 07/31/2014   • Multiple thyroid nodules 02/02/2014   • Joint pain 08/27/2013   • Urticaria, chronic 01/03/2013   • Hypertension 01/03/2013   • Asthma in adult 01/03/2013   • Shoulder pain 01/03/2013       Medications:    Current Outpatient Prescriptions:   •  Diclofenac Sodium (VOLTAREN) 1 % Gel, Apply 1 Application to skin as directed 2 times a day as needed., Disp: 100 g, Rfl: 2  •  hydroxychloroquine (PLAQUENIL) 200 MG Tab, TAKE 1 TABLET BY MOUTH TWICE DAILY, Disp: 60 Tab, Rfl: 5  •  Multiple Vitamins-Minerals (ONE-A-DAY 50 PLUS PO), Take  by mouth., Disp: , Rfl:   •  levothyroxine (SYNTHROID) 300 MCG Tab, Take 1 Tab by mouth every day., Disp: 30 Tab, Rfl: 3  •  zolpidem (AMBIEN) 5 MG Tab, Take 1 Tab by mouth at bedtime as needed for Sleep (1 to 3 po qhs prn insomnia/sleep study. Bring to sleep study.)., Disp: 3 Tab, Rfl: 0  •  tolterodine ER (DETROL-LA) 2 MG CAPSULE SR 24 HR, Take 1 Cap by  "mouth every day., Disp: 30 Cap, Rfl: 11  •  losartan (COZAAR) 100 MG Tab, Take 1 Tab by mouth every day., Disp: 30 Tab, Rfl: 2  •  ibuprofen (MOTRIN) 800 MG Tab, Take 1 Tab by mouth every 8 hours as needed for Mild Pain., Disp: 90 Tab, Rfl: 3  •  hydrocodone-acetaminophen (NORCO) 5-325 MG Tab per tablet, Take 1 Tab by mouth every 8 hours as needed., Disp: 60 Tab, Rfl: 0  •  NIFEdipine (ADALAT CC) 60 MG CR tablet, Take 1 Tab by mouth every day., Disp: 90 Tab, Rfl: 3  •  Azelastine-Fluticasone (DYMISTA) 137-50 MCG/ACT Suspension, Spray 1 Spray in nose 2 Times a Day., Disp: 1 Bottle, Rfl: 0  •  Albuterol Sulfate (PROAIR HFA INH), Inhale  by mouth., Disp: , Rfl:     Labs: Reviewed    Physical Examination:  Vital signs: /84   Pulse 98   Ht 1.676 m (5' 6\")   Wt (!) 139.3 kg (307 lb)   LMP 09/11/2016   SpO2 96%   BMI 49.55 kg/m²  Body mass index is 49.55 kg/m².  General: No apparent distress, cooperative  Eyes: No scleral icterus, no discharge, normal eyelids  Neck: No abnormal masses on inspection, normal thyroid exam  Resp: Normal effort, clear to auscultation bilaterally  CVS: Regular rate and rhythm, S1 S2 normal, no murmur  Extremities: No lower extremity edema  Abdomen: abdominal obesity present  Musculoskeletal: Normal digits and nails  Skin: No rash on visible skin  Psych: Alert and oriented, normal mood and affect, intact memory and able to make informed decisions.    Assessment and Plan:    1. Vitamin D deficiency  Could not tolerate 50,000 units of vitamin D previously. The low vitamin D levels could also be contributing to her fatigue. I have advised her to take at least 4000 units of vitamin D daily.    2. Hypothyroidism, unspecified type  Biochemical she is slightly hypothyroid. Clinically she does not have any symptoms or signs of hyperthyroidism. Continue 300 µg of levothyroxine daily.    3 Fatigue: Her fatigue is  Multi-factorial in nature. Treating the  vitamin D deficiency and the treatment " of sleep apnea with CPAP is likely to help.    Return in about 2 months (around 11/8/2017).    Thank you for allowing me to participate in the care of this patient.    Afshin Guardado M.D.    CC:   Brenda King, [email or web address].    This note was created using voice recognition software (Dragon). The accuracy of the dictation is limited by the abilities of the software. I have reviewed the note prior to signing, however some errors in grammar and context are still possible. If you have any questions related to this note please do not hesitate to contact our office.

## 2017-09-17 ENCOUNTER — SLEEP STUDY (OUTPATIENT)
Dept: SLEEP MEDICINE | Facility: MEDICAL CENTER | Age: 52
End: 2017-09-17
Attending: NURSE PRACTITIONER
Payer: COMMERCIAL

## 2017-09-17 DIAGNOSIS — G47.33 OSA (OBSTRUCTIVE SLEEP APNEA): ICD-10-CM

## 2017-09-17 PROCEDURE — 95811 POLYSOM 6/>YRS CPAP 4/> PARM: CPT | Performed by: INTERNAL MEDICINE

## 2017-09-19 NOTE — PROCEDURES
Clinical Comments:  The patient underwent a comprehensive polysomnogram using the standard montage for measurement of parameters of sleep, respiratory events, movement abnormalities, heart rate and rhythm. A microphone was used to monitor snoring.      INTERPRETATION:  The total recording time was 494.6 minutes with a sleep period of 434.1 minutes and the total sleep time was 364.6 minutes with a sleep efficiency of 73.7%.  The sleep latency was 60.4 minutes, and REM latency was 52.0 minutes.  The patient experienced 283 arousals in total, for an arousal index of 46.6    RESPIRATORY: The patient had 161 apneas in total.  Of these, 12 were obstructive apneas, and 149 were central apneas.  This resulted in an apnea index (AI) of 26.5.  The patient had 149 hypopneas, for a hypopnea index of 24.5.  The overall AHI was 51.0, while the AHI during Stage R sleep was 20.4.  AHI while supine was 68.1.    OXIMETRY: Oxygen saturation monitoring showed a mean SpO2 of 90.4%, with a minimum oxygen saturation of 77.0%.  Oxygen saturations were less than or = 89% for 55.7 minutes of sleep time.    CARDIAC: The highest heart rate during the recording was 102.0 beats per minute.  The average heart rate during sleep was 75.1 bpm.    LIMB MOVEMENTS: There were a total of 0 PLMs during sleep, of which 0 were PLMs arousals.  This resulted in a PLMS index of 0.0.    CPAP was tried at 10 cm H2O.  BiPAP was tried from 13/9 to 24/20 cm H2O.    Interpretation interpretation    This was an overnight positive airway pressure titration. The patient chose to use a small Simplus fullface mask with heated humidification.    The technician initiated treatment with CPAP at 10 cm of water pressure. CPAP was not effective. The patient was then placed on bilevel which was also generally ineffective with a few exceptions. For example, on bilevel 23/18 the patient was able to achieve REM, had a subsequent AHI of 7.5, a minimum saturation of 90%, and a mean  saturation of 94%. The patient also did reasonably well on bilevel 24/19 cm with a residual AHI of 10.4, a minimum saturation of 89%, and a mean saturation of 94.0%. The patient was able to achieve REM on bilevel of 24/19 as well.    Recommendation:    Recommend bilevel 23/18 cm of water using a mask of choice and heated humidification followed by clinical and data card review in 6 weeks. Alternatively, auto titrating BiPAP is an option.

## 2017-09-26 DIAGNOSIS — R52 BURNING PAIN: ICD-10-CM

## 2017-09-26 DIAGNOSIS — I73.00 RAYNAUD'S DISEASE WITHOUT GANGRENE: ICD-10-CM

## 2017-09-26 DIAGNOSIS — I10 ESSENTIAL HYPERTENSION: ICD-10-CM

## 2017-10-02 ENCOUNTER — SLEEP CENTER VISIT (OUTPATIENT)
Dept: SLEEP MEDICINE | Facility: MEDICAL CENTER | Age: 52
End: 2017-10-02
Payer: COMMERCIAL

## 2017-10-02 VITALS
DIASTOLIC BLOOD PRESSURE: 72 MMHG | HEIGHT: 66 IN | RESPIRATION RATE: 18 BRPM | WEIGHT: 293 LBS | OXYGEN SATURATION: 95 % | HEART RATE: 88 BPM | BODY MASS INDEX: 47.09 KG/M2 | TEMPERATURE: 99 F | SYSTOLIC BLOOD PRESSURE: 106 MMHG

## 2017-10-02 DIAGNOSIS — G47.10 HYPERSOMNOLENCE: ICD-10-CM

## 2017-10-02 DIAGNOSIS — G47.33 OBSTRUCTIVE SLEEP APNEA SYNDROME: ICD-10-CM

## 2017-10-02 DIAGNOSIS — G47.31 CENTRAL SLEEP APNEA: ICD-10-CM

## 2017-10-02 PROCEDURE — 99214 OFFICE O/P EST MOD 30 MIN: CPT | Performed by: INTERNAL MEDICINE

## 2017-10-10 ENCOUNTER — TELEPHONE (OUTPATIENT)
Dept: RHEUMATOLOGY | Facility: PHYSICIAN GROUP | Age: 52
End: 2017-10-10

## 2017-10-12 ENCOUNTER — OFFICE VISIT (OUTPATIENT)
Dept: RHEUMATOLOGY | Facility: PHYSICIAN GROUP | Age: 52
End: 2017-10-12
Payer: COMMERCIAL

## 2017-10-12 VITALS
DIASTOLIC BLOOD PRESSURE: 98 MMHG | OXYGEN SATURATION: 96 % | RESPIRATION RATE: 16 BRPM | BODY MASS INDEX: 50.04 KG/M2 | TEMPERATURE: 99.3 F | SYSTOLIC BLOOD PRESSURE: 146 MMHG | HEART RATE: 84 BPM | WEIGHT: 293 LBS

## 2017-10-12 DIAGNOSIS — M06.00 SERONEGATIVE RHEUMATOID ARTHRITIS (HCC): ICD-10-CM

## 2017-10-12 DIAGNOSIS — Z79.899 ENCOUNTER FOR LONG-TERM (CURRENT) USE OF HIGH-RISK MEDICATION: ICD-10-CM

## 2017-10-12 PROCEDURE — 99214 OFFICE O/P EST MOD 30 MIN: CPT | Performed by: INTERNAL MEDICINE

## 2017-10-12 RX ORDER — PREDNISONE 5 MG/1
TABLET ORAL
Qty: 30 TAB | Refills: 0 | Status: SHIPPED | OUTPATIENT
Start: 2017-10-12 | End: 2017-12-12

## 2017-10-12 ASSESSMENT — ENCOUNTER SYMPTOMS
FEVER: 0
CHILLS: 0

## 2017-10-12 NOTE — PROGRESS NOTES
Subjective:   Date of Consultation:10/12/2017  2:29 PM  Primary care physician:VALERIE Lopez      Reason for Consultation:  Ms. Clifford  is a pleasant 51 y.o. year old female who presented with follow-up of a polyarthralgia        Seronegative rheumatoid arthritis  She notes hand swelling and morning stiffness.  She feels the hand pain is worse.    Urticaria (autoimmune)  Quiescent  No new lesions    Low back pain  Not addressed today    RHEUM HISTORY:  Ms. Airam Clifford first presented to me with a history of autoimmune urticaria and elevated CRP.  She had been previously managed with plaquenil for her autoimmune urticaria as well as histamine blockers.  More recently to her initial visit she reports worsening hand pain and bilateral knee pain.  She had managed with 800 mg ibuprofen.  She also reported 1-2 hours of morning stiffness with swollen joint and stiff hands.  For her evaluation she had positive TPO antibody.  She was started on levothyroxin 6/2017.  In 4/2016 we started her on plaquenil 400 mg po q day.        Pertinent serologies  Rheumatoid factor is negative  Anti-CCP is negative    Results for AIRAM CLIFFORD (MRN [id number]) as of 8/3/2017 13:30   Ref. Range 4/18/2017 15:37 7/11/2017 11:59   Anti-Dna -Ds Latest Ref Range: None Detected  None Detected    Microsomal -Tpo- Abs Latest Ref Range: 0.0-9.0 IU/mL  11.1 (H)   Thyrotropin Receptor Abs Latest Ref Range: <=1.75 IU/L  2.53 (H)   Anti-Scl-70 Latest Ref Range: 0-40 AU/mL 0    Anti-Centromere B Ab Latest Ref Range: 0-40 AU/mL 2    Antinuclear Antibody Latest Ref Range: None Detected  None Detected    Rnp Antibodies Latest Ref Range: 0-40 AU/mL 0    Smith Antibodies Latest Ref Range: 0-40 AU/mL 0    SSA 52 (R0)(SANJUANA) Ab, IgG Latest Ref Range: 0-40 AU/mL 6    SSA 60 (R0)(SANJUANA) Ab, IgG Latest Ref Range: 0-40 AU/mL 14    Sjogren'S Anti-Ss-B Latest Ref Range: 0-40 AU/mL 1    ANCA IgG Latest Ref Range: <1:20  <1:20          Evaluation showed an  elevated CRP of 1.76 and a elevated ESR of 20    Hand x-ray from March 16, 2017 showed no evidence of any marginal erosions or periosteal reaction.    Past medical history: Graves, autoimmune urticaria, asthma, high blood pressure diagnosed in 1995.  No miscarriages.  She did have HTN/toxemia during her second pregnancy.  She denies any blood clot history.  Adult onset asthma. She had jaw surgery in middle school. In the 1990s she had tubal ligation. 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      Family history: Rheumatoid arthritis, mom and great aunt had arthritis. First cousin had thyroid Hashimoto's. Father had brain aneurysm.    Social history: Former smoker (social smoker once a week if any), occasional alcohol use. No IV drug use.    Past Medical History:   Diagnosis Date   • Urticaria, chronic 1/3/2013   • Bursitis of shoulder, left    • Chickenpox    • Sami measles    • Graves disease    • Hypertension    • Rheumatoid arthritis (CMS-[facility])    • Rotator cuff tear, left    • Seasonal allergies      Past Surgical History:   Procedure Laterality Date   • CARPAL TUNNEL RELEASE     • CARPAL TUNNEL RELEASE     • CUBITAL TUNNEL RELEASE     • OTHER      jaw surgery   • ROTATOR CUFF REPAIR      left   • SHOULDER SURGERY     • TUBAL COAGULATION LAPAROSCOPIC BILATERAL       Allergies   Allergen Reactions   • Erythromycin    • Keflex    • Pcn [Penicillins]      Outpatient Encounter Prescriptions as of 10/12/2017   Medication Sig Dispense Refill   • predniSONE (DELTASONE) 5 MG Tab 15 mg po q day x 5 days then 10 mg po q day x 5 days then 5 mg po q day x 5 days then stop 30 Tab 0   • NIFEdipine (ADALAT CC) 60 MG CR tablet Take 1 Tab by mouth every day. 30 Tab 2   • hydroxychloroquine (PLAQUENIL) 200 MG Tab TAKE 1 TABLET BY MOUTH TWICE DAILY 60 Tab 5   • levothyroxine (SYNTHROID) 300 MCG Tab Take 1 Tab by mouth every day. 30 Tab 3   • tolterodine ER  (DETROL-LA) 2 MG CAPSULE SR 24 HR Take 1 Cap by mouth every day. 30 Cap 11   • losartan (COZAAR) 100 MG Tab Take 1 Tab by mouth every day. 30 Tab 2   • ibuprofen (MOTRIN) 800 MG Tab Take 1 Tab by mouth every 8 hours as needed for Mild Pain. 90 Tab 3   • Diclofenac Sodium (VOLTAREN) 1 % Gel Apply 1 Application to skin as directed 2 times a day as needed. (Patient not taking: Reported on 10/2/2017) 100 g 2   • Multiple Vitamins-Minerals (ONE-A-DAY 50 PLUS PO) Take  by mouth.     • zolpidem (AMBIEN) 5 MG Tab Take 1 Tab by mouth at bedtime as needed for Sleep (1 to 3 po qhs prn insomnia/sleep study. Bring to sleep study.). (Patient not taking: Reported on 10/2/2017) 3 Tab 0   • hydrocodone-acetaminophen (NORCO) 5-325 MG Tab per tablet Take 1 Tab by mouth every 8 hours as needed. 60 Tab 0   • Azelastine-Fluticasone (DYMISTA) 137-50 MCG/ACT Suspension Spray 1 Spray in nose 2 Times a Day. (Patient not taking: Reported on 10/2/2017) 1 Bottle 0   • Albuterol Sulfate (PROAIR HFA INH) Inhale  by mouth.       No facility-administered encounter medications on file as of 10/12/2017.        Social History     Social History   • Marital status:      Spouse name: N/A   • Number of children: N/A   • Years of education: N/A     Occupational History   • Not on file.     Social History Main Topics   • Smoking status: Former Smoker     Packs/day: 0.25     Types: Cigarettes     Quit date: 6/22/2015   • Smokeless tobacco: Never Used   • Alcohol use 0.0 oz/week      Comment: Occassionally   • Drug use: No   • Sexual activity: Yes     Other Topics Concern   • Not on file     Social History Narrative   • No narrative on file      History   Smoking Status   • Former Smoker   • Packs/day: 0.25   • Types: Cigarettes   • Quit date: 6/22/2015   Smokeless Tobacco   • Never Used     History   Alcohol Use   • 0.0 oz/week     Comment: Occassionally     History   Drug Use No      OB History   No data available      Patient's last menstrual  period was 09/11/2016.    G [facility]     Family History   Problem Relation Age of Onset   • Arthritis Mother      RA   • Cancer Mother      breast   • Hypertension Father    • Cancer Maternal Grandmother        Review of Systems   Constitutional: Negative for chills and fever.   Musculoskeletal: Positive for joint pain.   Skin: Negative for rash.   Neurological:        Tingling on the hands        Objective:   /98   Pulse 84   Temp 37.4 °C (99.3 °F)   Resp 16   Wt (!) 140.6 kg (310 lb)   LMP 09/11/2016   SpO2 96%   Breastfeeding? No   BMI 50.04 kg/m²     Physical Exam   Constitutional: She is oriented to person, place, and time. She appears well-developed and well-nourished. No distress.   HENT:   Head: Normocephalic and atraumatic.   Right Ear: External ear normal.   Left Ear: External ear normal.   Eyes: Conjunctivae are normal. Right eye exhibits no discharge. Left eye exhibits no discharge.   Cardiovascular: Normal rate, regular rhythm and normal heart sounds.    Pulmonary/Chest: No stridor. No respiratory distress. She has no wheezes. She has no rales.   Musculoskeletal: She exhibits no edema.   Right wrist swelling and the mild bogginess of the MCP and PIP.  MTP tenderness bilateral   Neurological: She is alert and oriented to person, place, and time. No cranial nerve deficit.   Skin: Skin is warm and dry. No rash noted. She is not diaphoretic. No erythema. No pallor.   Psychiatric: She has a normal mood and affect. Her behavior is normal. Judgment and thought content normal.       Assessment:     1. Seronegative rheumatoid arthritis (CMS-HCC)  HEP B CORE AB TOTAL    HEP B SURFACE ANTIGEN    TB TEST CELL IMM MEASURE AG (QUANTIFERON)    HEP C VIRUS ANTIBODY    HEP B SURFACE AB   2. Encounter for long-term (current) use of high-risk medication       Labs:      No results found for: QNTTBGOLD  No results found for: HEPBCORTOT, HEPBCORIGM, HEPBSAG  No results found for: HEPCAB  Lab Results   Component  Value Date/Time    SODIUM 135 07/11/2017 11:59 AM    POTASSIUM 3.7 07/11/2017 11:59 AM    CHLORIDE 104 07/11/2017 11:59 AM    CO2 24 07/11/2017 11:59 AM    GLUCOSE 104 (H) 07/11/2017 11:59 AM    BUN 20 07/11/2017 11:59 AM    CREATININE 0.64 07/11/2017 11:59 AM      Lab Results   Component Value Date/Time    WBC 8.6 07/11/2017 11:59 AM    RBC 4.78 07/11/2017 11:59 AM    HEMOGLOBIN 14.2 07/11/2017 11:59 AM    HEMATOCRIT 42.3 07/11/2017 11:59 AM    MCV 88.5 07/11/2017 11:59 AM    MCH 29.7 07/11/2017 11:59 AM    MCHC 33.6 07/11/2017 11:59 AM    MPV 9.4 07/11/2017 11:59 AM    NEUTSPOLYS 57.80 07/11/2017 11:59 AM    LYMPHOCYTES 32.20 07/11/2017 11:59 AM    MONOCYTES 7.20 07/11/2017 11:59 AM    EOSINOPHILS 2.00 07/11/2017 11:59 AM    BASOPHILS 0.70 07/11/2017 11:59 AM    HYPOCHROMIA 1+ 07/08/2014 09:03 AM      Lab Results   Component Value Date/Time    CALCIUM 9.4 07/11/2017 11:59 AM    ASTSGOT 12 07/11/2017 11:59 AM    ALTSGPT 17 07/11/2017 11:59 AM    ALKPHOSPHAT 40 07/11/2017 11:59 AM    TBILIRUBIN 0.4 07/11/2017 11:59 AM    ALBUMIN 4.0 07/11/2017 11:59 AM    TOTPROTEIN 7.1 07/11/2017 11:59 AM     Lab Results   Component Value Date/Time    URICACID 3.1 04/18/2017 03:37 PM    RHEUMFACTN <10 03/14/2017 08:14 AM    CCPANTIBODY 3 03/14/2017 08:14 AM    ANTINUCAB None Detected 04/18/2017 03:37 PM     Lab Results   Component Value Date/Time    SEDRATEWES 16 07/11/2017 11:59 AM    CREACTPROT 1.51 (H) 07/11/2017 11:59 AM     No results found for: DR HANNAHVVTINTERP  Lab Results   Component Value Date/Time    G3BFILVMYQH 184.0 04/18/2017 03:37 PM    I6YHOXADMTX 48.0 04/18/2017 03:37 PM     Lab Results   Component Value Date/Time    ANTIDNADS None Detected 04/18/2017 03:37 PM    RNPAB 0 04/18/2017 03:37 PM    SMITHAB 0 04/18/2017 03:37 PM    ETKHLLL38 0 04/18/2017 03:37 PM    CENTROMBAB 2 04/18/2017 03:37 PM     Lab Results   Component Value Date/Time    ANTIDNADS None Detected 04/18/2017 03:37 PM    ANCAIGG <1:20  04/18/2017 03:37 PM    L8ULXQBOGAN 184.0 04/18/2017 03:37 PM    RNPAB 0 04/18/2017 03:37 PM    ANTISSBSJ 1 04/18/2017 03:37 PM     Lab Results   Component Value Date/Time    COLORURINE Lt. Yellow 04/18/2017 03:37 PM    SPECGRAVITY 1.021 04/18/2017 03:37 PM    PHURINE 5.5 04/18/2017 03:37 PM    GLUCOSEUR Negative 04/18/2017 03:37 PM    KETONES Negative 04/18/2017 03:37 PM    PROTEINURIN Negative 04/18/2017 03:37 PM     No results found for: TOTPROTUR, TOTALVOLUME, [id number]  Lab Results   Component Value Date/Time    SSA60 14 04/18/2017 03:37 PM    SSA52 6 04/18/2017 03:37 PM     Lab Results   Component Value Date/Time    HBA1C 5.3 01/18/2013 10:59 AM     No results found for: CPKTOTAL  Lab Results   Component Value Date/Time    G6PD 12.6 04/18/2017 03:37 PM     No results found for: HRHM19FPWX  No results found for: ACESERUM  Lab Results   Component Value Date/Time    25HYDROXY 26 (L) 05/08/2017 03:17 PM     No results found for: TSH, FREEDIR  Lab Results   Component Value Date/Time    TSHULTRASEN 0.280 (L) 09/06/2017 01:04 PM    FREET4 1.69 (H) 09/06/2017 01:04 PM     Lab Results   Component Value Date/Time    MICROSOMALA 11.1 (H) 07/11/2017 11:59 AM    ANTITHYROGL <0.9 01/03/2014 10:40 AM     No results found for: IGGLYMEABS  No results found for: ANTIMITOCHO, FACTIN  No results found for: IGA, TTRANSIGA, ENDOIGA  No results found for: FLTYPE, CRYSTALSBDF  No results found for: ISTATICAL  No results found for: ISTATCREAT  No results found for: CTELOPEP  No results found for: GBMABG  No results found for: PTHINTACT      Medical Decision Making:  Today's Assessment / Status / Plan:     Seronegative rheumatoid arthritis  Swelling of the MCP and wrist.  She is unresponsive to plaquenil.  We will move forward and try plaquenil.  I will provide a course of steroids.  I will get basic labs and start methotrexate at 5 tabs weekly.    I reviewed the side effects of prednisone including but not limited to hypertension,  glaucoma, diabetes and osteoporosis.  I have advised him to take daily calcium and vitamin D.      I reviewed the side effects of methotrexate including but not limited to hepatotoxicity, nausea, hair loss, the importance of taking folic acid, and close monitoring of his labs.          Low back pain  She has features suggestive of an inflammatory pain  Will get lumbar spine xray  Will get sacroiliac xray  Will check HLA B27   I have asked the patient to get labs HLA B27 as well    Autoimmune urticaria  For now stable  Continue plaquenil      Tingling - peripheral neuropathy???  Will check B12 normal  Folate is normal    1. Seronegative rheumatoid arthritis (CMS-HCC)  HEP B CORE AB TOTAL    HEP B SURFACE ANTIGEN    TB TEST CELL IMM MEASURE AG (QUANTIFERON)    HEP C VIRUS ANTIBODY    HEP B SURFACE AB   2. Encounter for long-term (current) use of high-risk medication       Return in about 8 weeks (around 12/7/2017).      She agreed and verbalized her agreement and understanding with the current plan. I answered all questions and concerns she has at this time and advised her to call at any time in there interim with questions or concerns in regards to her care.    Thank you for allowing me to participate in her care, I will continue to follow closely.

## 2017-10-12 NOTE — LETTER
[facility]   [address], [address]  [place] [address]  Phone: [phone]  Fax: [phone]              Encounter Date: 10/12/2017    Dear Dr. Vegas ref. provider found,    It was a pleasure seeing your patient, Gabriella Clifford, on 10/12/2017. Diagnoses of Seronegative rheumatoid arthritis (CMS-[facility]) and Encounter for long-term (current) use of high-risk medication were pertinent to this visit.     Please find attached progress note which includes the history I obtained from Ms. Clifford, my physical examination findings, my impression and recommendations.      Once again, it was a pleasure participating in your patient's care.  Please feel free to contact me if you have any questions or if I can be of any further assistance to your patients.      Sincerely,    Maylin Willingham M.D.  Electronically Signed          PROGRESS NOTE:  Subjective:   Date of Consultation:10/12/2017  2:29 PM  Primary care physician:VALERIE Lopez      Reason for Consultation:  Ms. Clifford  is a pleasant 51 y.o. year old female who presented with follow-up of a polyarthralgia        Seronegative rheumatoid arthritis  She notes hand swelling and morning stiffness.  She feels the hand pain is worse.    Urticaria (autoimmune)  Quiescent  No new lesions    Low back pain  Not addressed today    RHEUM HISTORY:  Ms. Gabriella Clifford first presented to me with a history of autoimmune urticaria and elevated CRP.  She had been previously managed with plaquenil for her autoimmune urticaria as well as histamine blockers.  More recently to her initial visit she reports worsening hand pain and bilateral knee pain.  She had managed with 800 mg ibuprofen.  She also reported 1-2 hours of morning stiffness with swollen joint and stiff hands.  For her evaluation she had positive TPO antibody.  She was started on levothyroxin 6/2017.  In 4/2016 we started her on plaquenil 400 mg po q day.        Pertinent  serologies  Rheumatoid factor is negative  Anti-CCP is negative    Results for AIRAM WATSON (MRN [id number]) as of 8/3/2017 13:30   Ref. Range 4/18/2017 15:37 7/11/2017 11:59   Anti-Dna -Ds Latest Ref Range: None Detected  None Detected    Microsomal -Tpo- Abs Latest Ref Range: 0.0-9.0 IU/mL  11.1 (H)   Thyrotropin Receptor Abs Latest Ref Range: <=1.75 IU/L  2.53 (H)   Anti-Scl-70 Latest Ref Range: 0-40 AU/mL 0    Anti-Centromere B Ab Latest Ref Range: 0-40 AU/mL 2    Antinuclear Antibody Latest Ref Range: None Detected  None Detected    Rnp Antibodies Latest Ref Range: 0-40 AU/mL 0    Smith Antibodies Latest Ref Range: 0-40 AU/mL 0    SSA 52 (R0)(SANJUANA) Ab, IgG Latest Ref Range: 0-40 AU/mL 6    SSA 60 (R0)(SANJUANA) Ab, IgG Latest Ref Range: 0-40 AU/mL 14    Sjogren'S Anti-Ss-B Latest Ref Range: 0-40 AU/mL 1    ANCA IgG Latest Ref Range: <1:20  <1:20          Evaluation showed an elevated CRP of 1.76 and a elevated ESR of 20    Hand x-ray from March 16, 2017 showed no evidence of any marginal erosions or periosteal reaction.    Past medical history: Graves, autoimmune urticaria, asthma, high blood pressure diagnosed in 1995.  No miscarriages.  She did have HTN/toxemia during her second pregnancy.  She denies any blood clot history.  Adult onset asthma. She had jaw surgery in middle school. In the 1990s she had tubal ligation. 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      Family history: Rheumatoid arthritis, mom and great aunt had arthritis. First cousin had thyroid Hashimoto's. Father had brain aneurysm.    Social history: Former smoker (social smoker once a week if any), occasional alcohol use. No IV drug use.    Past Medical History:   Diagnosis Date   • Urticaria, chronic 1/3/2013   • Bursitis of shoulder, left    • Chickenpox    • Welsh measles    • Graves disease    • Hypertension    • Rheumatoid arthritis (CMS-HCC)    • Rotator cuff tear, left     • Seasonal allergies      Past Surgical History:   Procedure Laterality Date   • CARPAL TUNNEL RELEASE     • CARPAL TUNNEL RELEASE     • CUBITAL TUNNEL RELEASE     • OTHER      jaw surgery   • ROTATOR CUFF REPAIR      left   • SHOULDER SURGERY     • TUBAL COAGULATION LAPAROSCOPIC BILATERAL       Allergies   Allergen Reactions   • Erythromycin    • Keflex    • Pcn [Penicillins]      Outpatient Encounter Prescriptions as of 10/12/2017   Medication Sig Dispense Refill   • predniSONE (DELTASONE) 5 MG Tab 15 mg po q day x 5 days then 10 mg po q day x 5 days then 5 mg po q day x 5 days then stop 30 Tab 0   • NIFEdipine (ADALAT CC) 60 MG CR tablet Take 1 Tab by mouth every day. 30 Tab 2   • hydroxychloroquine (PLAQUENIL) 200 MG Tab TAKE 1 TABLET BY MOUTH TWICE DAILY 60 Tab 5   • levothyroxine (SYNTHROID) 300 MCG Tab Take 1 Tab by mouth every day. 30 Tab 3   • tolterodine ER (DETROL-LA) 2 MG CAPSULE SR 24 HR Take 1 Cap by mouth every day. 30 Cap 11   • losartan (COZAAR) 100 MG Tab Take 1 Tab by mouth every day. 30 Tab 2   • ibuprofen (MOTRIN) 800 MG Tab Take 1 Tab by mouth every 8 hours as needed for Mild Pain. 90 Tab 3   • Diclofenac Sodium (VOLTAREN) 1 % Gel Apply 1 Application to skin as directed 2 times a day as needed. (Patient not taking: Reported on 10/2/2017) 100 g 2   • Multiple Vitamins-Minerals (ONE-A-DAY 50 PLUS PO) Take  by mouth.     • zolpidem (AMBIEN) 5 MG Tab Take 1 Tab by mouth at bedtime as needed for Sleep (1 to 3 po qhs prn insomnia/sleep study. Bring to sleep study.). (Patient not taking: Reported on 10/2/2017) 3 Tab 0   • hydrocodone-acetaminophen (NORCO) 5-325 MG Tab per tablet Take 1 Tab by mouth every 8 hours as needed. 60 Tab 0   • Azelastine-Fluticasone (DYMISTA) 137-50 MCG/ACT Suspension Spray 1 Spray in nose 2 Times a Day. (Patient not taking: Reported on 10/2/2017) 1 Bottle 0   • Albuterol Sulfate (PROAIR HFA INH) Inhale  by mouth.       No facility-administered encounter medications on file  as of 10/12/2017.        Social History     Social History   • Marital status:      Spouse name: N/A   • Number of children: N/A   • Years of education: N/A     Occupational History   • Not on file.     Social History Main Topics   • Smoking status: Former Smoker     Packs/day: 0.25     Types: Cigarettes     Quit date: 6/22/2015   • Smokeless tobacco: Never Used   • Alcohol use 0.0 oz/week      Comment: Occassionally   • Drug use: No   • Sexual activity: Yes     Other Topics Concern   • Not on file     Social History Narrative   • No narrative on file      History   Smoking Status   • Former Smoker   • Packs/day: 0.25   • Types: Cigarettes   • Quit date: 6/22/2015   Smokeless Tobacco   • Never Used     History   Alcohol Use   • 0.0 oz/week     Comment: Occassionally     History   Drug Use No      OB History   No data available      Patient's last menstrual period was 09/11/2016.    USHA P A L     Family History   Problem Relation Age of Onset   • Arthritis Mother      RA   • Cancer Mother      breast   • Hypertension Father    • Cancer Maternal Grandmother        Review of Systems   Constitutional: Negative for chills and fever.   Musculoskeletal: Positive for joint pain.   Skin: Negative for rash.   Neurological:        Tingling on the hands        Objective:   /98   Pulse 84   Temp 37.4 °C (99.3 °F)   Resp 16   Wt (!) 140.6 kg (310 lb)   LMP 09/11/2016   SpO2 96%   Breastfeeding? No   BMI 50.04 kg/m²      Physical Exam   Constitutional: She is oriented to person, place, and time. She appears well-developed and well-nourished. No distress.   HENT:   Head: Normocephalic and atraumatic.   Right Ear: External ear normal.   Left Ear: External ear normal.   Eyes: Conjunctivae are normal. Right eye exhibits no discharge. Left eye exhibits no discharge.   Cardiovascular: Normal rate, regular rhythm and normal heart sounds.    Pulmonary/Chest: No stridor. No respiratory distress. She has no wheezes. She  has no rales.   Musculoskeletal: She exhibits no edema.   Right wrist swelling and the mild bogginess of the MCP and PIP.  MTP tenderness bilateral   Neurological: She is alert and oriented to person, place, and time. No cranial nerve deficit.   Skin: Skin is warm and dry. No rash noted. She is not diaphoretic. No erythema. No pallor.   Psychiatric: She has a normal mood and affect. Her behavior is normal. Judgment and thought content normal.       Assessment:     1. Seronegative rheumatoid arthritis (CMS-HCC)  HEP B CORE AB TOTAL    HEP B SURFACE ANTIGEN    TB TEST CELL IMM MEASURE AG (QUANTIFERON)    HEP C VIRUS ANTIBODY    HEP B SURFACE AB   2. Encounter for long-term (current) use of high-risk medication       Labs:      No results found for: QNTTBGOLD  No results found for: HEPBCORTOT, HEPBCORIGM, HEPBSAG  No results found for: HEPCAB  Lab Results   Component Value Date/Time    SODIUM 135 07/11/2017 11:59 AM    POTASSIUM 3.7 07/11/2017 11:59 AM    CHLORIDE 104 07/11/2017 11:59 AM    CO2 24 07/11/2017 11:59 AM    GLUCOSE 104 (H) 07/11/2017 11:59 AM    BUN 20 07/11/2017 11:59 AM    CREATININE 0.64 07/11/2017 11:59 AM      Lab Results   Component Value Date/Time    WBC 8.6 07/11/2017 11:59 AM    RBC 4.78 07/11/2017 11:59 AM    HEMOGLOBIN 14.2 07/11/2017 11:59 AM    HEMATOCRIT 42.3 07/11/2017 11:59 AM    MCV 88.5 07/11/2017 11:59 AM    MCH 29.7 07/11/2017 11:59 AM    MCHC 33.6 07/11/2017 11:59 AM    MPV 9.4 07/11/2017 11:59 AM    NEUTSPOLYS 57.80 07/11/2017 11:59 AM    LYMPHOCYTES 32.20 07/11/2017 11:59 AM    MONOCYTES 7.20 07/11/2017 11:59 AM    EOSINOPHILS 2.00 07/11/2017 11:59 AM    BASOPHILS 0.70 07/11/2017 11:59 AM    HYPOCHROMIA 1+ 07/08/2014 09:03 AM      Lab Results   Component Value Date/Time    CALCIUM 9.4 07/11/2017 11:59 AM    ASTSGOT 12 07/11/2017 11:59 AM    ALTSGPT 17 07/11/2017 11:59 AM    ALKPHOSPHAT 40 07/11/2017 11:59 AM    TBILIRUBIN 0.4 07/11/2017 11:59 AM    ALBUMIN 4.0 07/11/2017 11:59 AM     TOTPROTEIN 7.1 07/11/2017 11:59 AM     Lab Results   Component Value Date/Time    URICACID 3.1 04/18/2017 03:37 PM    RHEUMFACTN <10 03/14/2017 08:14 AM    CCPANTIBODY 3 03/14/2017 08:14 AM    ANTINUCAB None Detected 04/18/2017 03:37 PM     Lab Results   Component Value Date/Time    SEDRATEWES 16 07/11/2017 11:59 AM    CREACTPROT 1.51 (H) 07/11/2017 11:59 AM     No results found for: TERESAELVIPER, VVTINTERP  Lab Results   Component Value Date/Time    T8ZNTUVTNYJ 184.0 04/18/2017 03:37 PM    E4XCXQZOXMC 48.0 04/18/2017 03:37 PM     Lab Results   Component Value Date/Time    ANTIDNADS None Detected 04/18/2017 03:37 PM    RNPAB 0 04/18/2017 03:37 PM    SMITHAB 0 04/18/2017 03:37 PM    THZQUAW78 0 04/18/2017 03:37 PM    CENTROMBAB 2 04/18/2017 03:37 PM     Lab Results   Component Value Date/Time    ANTIDNADS None Detected 04/18/2017 03:37 PM    ANCAIGG <1:20 04/18/2017 03:37 PM    M3OOFLQJHRT 184.0 04/18/2017 03:37 PM    RNPAB 0 04/18/2017 03:37 PM    ANTISSBSJ 1 04/18/2017 03:37 PM     Lab Results   Component Value Date/Time    COLORURINE Lt. Yellow 04/18/2017 03:37 PM    SPECGRAVITY 1.021 04/18/2017 03:37 PM    PHURINE 5.5 04/18/2017 03:37 PM    GLUCOSEUR Negative 04/18/2017 03:37 PM    KETONES Negative 04/18/2017 03:37 PM    PROTEINURIN Negative 04/18/2017 03:37 PM     No results found for: TOTPROTUR, TOTALVOLUME, IGSPKTBI98  Lab Results   Component Value Date/Time    SSA60 14 04/18/2017 03:37 PM    SSA52 6 04/18/2017 03:37 PM     Lab Results   Component Value Date/Time    HBA1C 5.3 01/18/2013 10:59 AM     No results found for: CPKTOTAL  Lab Results   Component Value Date/Time    G6PD 12.6 04/18/2017 03:37 PM     No results found for: MYKM84VTVZ  No results found for: ACESERUM  Lab Results   Component Value Date/Time    25HYDROXY 26 (L) 05/08/2017 03:17 PM     No results found for: TSH, FREEDIR  Lab Results   Component Value Date/Time    TSHULTRASEN 0.280 (L) 09/06/2017 01:04 PM    FREET4 1.69 (H) 09/06/2017 01:04  PM     Lab Results   Component Value Date/Time    MICROSOMALA 11.1 (H) 07/11/2017 11:59 AM    ANTITHYROGL <0.9 01/03/2014 10:40 AM     No results found for: IGGLYMEABS  No results found for: ANTIMITOCHO, FACTIN  No results found for: IGA, TTRANSIGA, ENDOIGA  No results found for: FLTYPE, CRYSTALSBDF  No results found for: ISTATICAL  No results found for: ISTATCREAT  No results found for: CTELOPEP  No results found for: GBMABG  No results found for: PTHINTACT      Medical Decision Making:  Today's Assessment / Status / Plan:     Seronegative rheumatoid arthritis  Swelling of the MCP and wrist.  She is unresponsive to plaquenil.  We will move forward and try plaquenil.  I will provide a course of steroids.  I will get basic labs and start methotrexate at 5 tabs weekly.    I reviewed the side effects of prednisone including but not limited to hypertension, glaucoma, diabetes and osteoporosis.  I have advised him to take daily calcium and vitamin D.      I reviewed the side effects of methotrexate including but not limited to hepatotoxicity, nausea, hair loss, the importance of taking folic acid, and close monitoring of his labs.          Low back pain  She has features suggestive of an inflammatory pain  Will get lumbar spine xray  Will get sacroiliac xray  Will check HLA B27   I have asked the patient to get labs HLA B27 as well    Autoimmune urticaria  For now stable  Continue plaquenil      Tingling - peripheral neuropathy???  Will check B12 normal  Folate is normal    1. Seronegative rheumatoid arthritis (CMS-HCC)  HEP B CORE AB TOTAL    HEP B SURFACE ANTIGEN    TB TEST CELL IMM MEASURE AG (QUANTIFERON)    HEP C VIRUS ANTIBODY    HEP B SURFACE AB   2. Encounter for long-term (current) use of high-risk medication       Return in about 8 weeks (around 12/7/2017).      She agreed and verbalized her agreement and understanding with the current plan. I answered all questions and concerns she has at this time and advised  her to call at any time in there interim with questions or concerns in regards to her care.    Thank you for allowing me to participate in her care, I will continue to follow closely.

## 2017-10-15 NOTE — PROGRESS NOTES
CC:  Sleep apnea hypopnea syndrome.    HPI:   Ms. Clifford has been evaluated here for snoring, witnessed nocturnal apnea and excessive daytime somnolence.  She has a history of inhalation allergies, mild intermittent asthma, systemic arterial hypertension and Graves' disease as well as possible rheumatoid arthritis.    A polysomnogram on August 18, 2017 demonstrated an apnea hypopnea index of 141 events per hour, primarily hypopnea episodes, and a minimum arterial oxygen saturation of 81% on room air.  CPAP in a pressure range of 5-18 cm water did not resolve the hypopnea episodes and she underwent a subsequent titration polysomnogram.    That study on September 17, 2017 demonstrates fragmented sleep but significant REM sleep time was recorded and no periodic limb movements were noted.  BiPAP therapy was applied in a pressure range of 13-24/9-20 cm water.  She did reasonably well in non-REM sleep with an expiratory pressure of 9 or above but there were frequent central apnea episodes scattered through the night and hypopnea episodes persisted during REM and supine sleep until the final pressure stages.  Overall there were 149 episodes of central apnea, 12 episodes of obstructive apnea, and height 49 episodes of hypopnea. At a pressure of 23/18, the apnea hypopnea index was 7.5 events per hour with a lowest arterial oxygen saturation of 90% on room air.  That stage in the titration included 3 minutes of REM sleep time, 37 minutes of non-REM sleep time, but no time in the supine body position.         Patient Active Problem List    Diagnosis Date Noted   • DENISE (obstructive sleep apnea)-presumed 06/22/2017   • Acquired hypothyroidism 06/22/2017   • Health care maintenance 04/24/2017   • Morbid obesity with BMI of 45.0-49.9, adult (HCC) 03/14/2017   • Chronic cough 12/22/2015   • Urinary urgency 09/22/2015   • Burning pain 09/22/2015   • Graves disease 02/12/2015   • Subclinical hyperthyroidism 07/31/2014   • Multiple  thyroid nodules 02/02/2014   • Joint pain 08/27/2013   • Urticaria, chronic 01/03/2013   • Hypertension 01/03/2013   • Asthma in adult 01/03/2013   • Shoulder pain 01/03/2013       Past Medical History:   Diagnosis Date   • Urticaria, chronic 1/3/2013   • Bursitis of shoulder, left    • Chickenpox    • Faroese measles    • Graves disease    • Hypertension    • Rheumatoid arthritis(714.0)    • Rotator cuff tear, left    • Seasonal allergies        Past Surgical History:   Procedure Laterality Date   • CARPAL TUNNEL RELEASE     • CARPAL TUNNEL RELEASE     • CUBITAL TUNNEL RELEASE     • OTHER      jaw surgery   • ROTATOR CUFF REPAIR      left   • SHOULDER SURGERY     • TUBAL COAGULATION LAPAROSCOPIC BILATERAL         Family History   Problem Relation Age of Onset   • Arthritis Mother      RA   • Cancer Mother      breast   • Hypertension Father    • Cancer Maternal Grandmother        Social History     Social History   • Marital status:      Spouse name: N/A   • Number of children: N/A   • Years of education: N/A     Occupational History   • Not on file.     Social History Main Topics   • Smoking status: Former Smoker     Packs/day: 0.25     Types: Cigarettes     Quit date: 6/22/2015   • Smokeless tobacco: Never Used   • Alcohol use 0.0 oz/week      Comment: Occassionally   • Drug use: No   • Sexual activity: Yes     Other Topics Concern   • Not on file     Social History Narrative   • No narrative on file       Current Outpatient Prescriptions   Medication Sig Dispense Refill   • NIFEdipine (ADALAT CC) 60 MG CR tablet Take 1 Tab by mouth every day. 30 Tab 2   • hydroxychloroquine (PLAQUENIL) 200 MG Tab TAKE 1 TABLET BY MOUTH TWICE DAILY 60 Tab 5   • levothyroxine (SYNTHROID) 300 MCG Tab Take 1 Tab by mouth every day. 30 Tab 3   • tolterodine ER (DETROL-LA) 2 MG CAPSULE SR 24 HR Take 1 Cap by mouth every day. 30 Cap 11   • losartan (COZAAR) 100 MG Tab Take 1 Tab by mouth every day. 30 Tab 2   • ibuprofen (MOTRIN)  "800 MG Tab Take 1 Tab by mouth every 8 hours as needed for Mild Pain. 90 Tab 3   • predniSONE (DELTASONE) 5 MG Tab 15 mg po q day x 5 days then 10 mg po q day x 5 days then 5 mg po q day x 5 days then stop 30 Tab 0   • Diclofenac Sodium (VOLTAREN) 1 % Gel Apply 1 Application to skin as directed 2 times a day as needed. (Patient not taking: Reported on 10/2/2017) 100 g 2   • Multiple Vitamins-Minerals (ONE-A-DAY 50 PLUS PO) Take  by mouth.     • zolpidem (AMBIEN) 5 MG Tab Take 1 Tab by mouth at bedtime as needed for Sleep (1 to 3 po qhs prn insomnia/sleep study. Bring to sleep study.). (Patient not taking: Reported on 10/2/2017) 3 Tab 0   • hydrocodone-acetaminophen (NORCO) 5-325 MG Tab per tablet Take 1 Tab by mouth every 8 hours as needed. 60 Tab 0   • Azelastine-Fluticasone (DYMISTA) 137-50 MCG/ACT Suspension Spray 1 Spray in nose 2 Times a Day. (Patient not taking: Reported on 10/2/2017) 1 Bottle 0   • Albuterol Sulfate (PROAIR HFA INH) Inhale  by mouth.       No current facility-administered medications for this visit.     \"CURRENT RX\"      Allergies: Erythromycin; Keflex; and Pcn [penicillins]      ROS  Unchanged from prior and notable for the sleep, respiratory, cardiac and endocrine issues reviewed above as well as the items in the past medical history and problem list.  All other aspects of the CPT review of systems process are negative.      Physical Exam:   /72   Pulse 88   Temp 37.2 °C (99 °F)   Resp 18   Ht 1.676 m (5' 6\")   Wt (!) 139.3 kg (307 lb)   LMP 09/11/2016   SpO2 95%   BMI 49.55 kg/m²    Head and neck examination demonstrates no mucosal lesion, purulent drainage or evident polyps. The pharynx is benign with a Mallampati III presentation. The neck is supple without thyromegaly. On chest examination there are symmetrical bilateral breath sounds without rales, wheezing or consolidation. On cardiac examination, the apical impulse and heart sounds are normal and the rhythm is regular. " There is no murmur, gallop or rub and no jugular venous distention. The abdomen is soft with active bowel sounds and no palpable hepatosplenomegaly, mass, guarding or rebound. The extremities show no clubbing, cyanosis or edema and no signs of deep venous thrombosis. There is no warmth, redness, tenderness or palpable venous cord in the calves. The skin is clear, warm and dry. There is no unusual peripheral lymphadenopathy. Peripheral pulses are palpable in all 4 extremities. On neurologic examination, cranial nerve function is intact, motor tone is symmetrical, and the patient is alert, oriented and responsive.       Problems:  1. Obstructive sleep apnea syndrome  She has very severe sleep apnea hypopnea with an apnea hypopnea index of 141 events per hour and a lowest arterial oxygen saturation of 81% on room air.  Clinically this seemed to be obstructive sleep apnea hypopnea but in the titration study central apnea episodes predominated suggesting possible complex sleep apnea.  She did reasonably well in non-REM, nonsupine sleep on BiPAP at 13/9 cm water but pressures as high as 23/18 cm water were required to reduce the number of hypopnea episodes in supine and REM sleep.  We have discussed treatment options at this point.  Follow-up polysomnography dedicated to titration of servo adaptive BiPAP ventilation might be considered but the patient is understandably reluctant to undergo another sleep test.  After discussion we will initiate auto titrating BiPAP with a pressure range of 8-20 cm water and pressure support at 5 cm water.  If her response to that treatment is not optimal she will require polysomnographic titration of ASV therapy.    2. Hypersomnolence  Probably related to the severe sleep disordered breathing.      Plan:   1.  Mask fitting today.  She did well with a medium Marlys view mask.    2.  Initiate auto titrating BiPAP with an expiratory pressure range of 8-20 and pressure support at 5 cm water.   We have talked about acclimating to the BiPAP with proper mask fit, effective humidification and consistent use.    3.  Return visit here in about 6 weeks, bringing the BiPAP data recording chip with her.  She may drop into the technician clinic at anytime for assistance.  If her response to auto titrating BiPAP is not optimal, she will likely require further polysomnographic titration of ASV therapy.    We appreciate the opportunity to assist in her care.

## 2017-10-20 ENCOUNTER — HOSPITAL ENCOUNTER (OUTPATIENT)
Dept: LAB | Facility: MEDICAL CENTER | Age: 52
End: 2017-10-20
Attending: INTERNAL MEDICINE
Payer: COMMERCIAL

## 2017-10-20 DIAGNOSIS — E03.9 HYPOTHYROIDISM, UNSPECIFIED TYPE: ICD-10-CM

## 2017-10-20 DIAGNOSIS — M06.00 SERONEGATIVE RHEUMATOID ARTHRITIS (HCC): ICD-10-CM

## 2017-10-20 LAB
ALBUMIN SERPL BCP-MCNC: 3.8 G/DL (ref 3.2–4.9)
ALBUMIN/GLOB SERPL: 1.2 G/DL
ALP SERPL-CCNC: 49 U/L (ref 30–99)
ALT SERPL-CCNC: 15 U/L (ref 2–50)
ANION GAP SERPL CALC-SCNC: 11 MMOL/L (ref 0–11.9)
AST SERPL-CCNC: 12 U/L (ref 12–45)
BASOPHILS # BLD AUTO: 0.5 % (ref 0–1.8)
BASOPHILS # BLD: 0.05 K/UL (ref 0–0.12)
BILIRUB SERPL-MCNC: 0.2 MG/DL (ref 0.1–1.5)
BUN SERPL-MCNC: 21 MG/DL (ref 8–22)
CALCIUM SERPL-MCNC: 9.1 MG/DL (ref 8.5–10.5)
CHLORIDE SERPL-SCNC: 105 MMOL/L (ref 96–112)
CO2 SERPL-SCNC: 24 MMOL/L (ref 20–33)
CREAT SERPL-MCNC: 0.64 MG/DL (ref 0.5–1.4)
EOSINOPHIL # BLD AUTO: 0.29 K/UL (ref 0–0.51)
EOSINOPHIL NFR BLD: 2.7 % (ref 0–6.9)
ERYTHROCYTE [DISTWIDTH] IN BLOOD BY AUTOMATED COUNT: 41.8 FL (ref 35.9–50)
GFR SERPL CREATININE-BSD FRML MDRD: >60 ML/MIN/1.73 M 2
GLOBULIN SER CALC-MCNC: 3.1 G/DL (ref 1.9–3.5)
GLUCOSE SERPL-MCNC: 100 MG/DL (ref 65–99)
HBV CORE AB SERPL QL IA: NEGATIVE
HBV SURFACE AB SERPL IA-ACNC: <3.1 MIU/ML (ref 0–10)
HBV SURFACE AG SER QL: NEGATIVE
HCT VFR BLD AUTO: 42.8 % (ref 37–47)
HCV AB SER QL: NEGATIVE
HGB BLD-MCNC: 14.5 G/DL (ref 12–16)
IMM GRANULOCYTES # BLD AUTO: 0.03 K/UL (ref 0–0.11)
IMM GRANULOCYTES NFR BLD AUTO: 0.3 % (ref 0–0.9)
LYMPHOCYTES # BLD AUTO: 2.8 K/UL (ref 1–4.8)
LYMPHOCYTES NFR BLD: 26.5 % (ref 22–41)
MCH RBC QN AUTO: 29.6 PG (ref 27–33)
MCHC RBC AUTO-ENTMCNC: 33.9 G/DL (ref 33.6–35)
MCV RBC AUTO: 87.3 FL (ref 81.4–97.8)
MONOCYTES # BLD AUTO: 0.71 K/UL (ref 0–0.85)
MONOCYTES NFR BLD AUTO: 6.7 % (ref 0–13.4)
NEUTROPHILS # BLD AUTO: 6.7 K/UL (ref 2–7.15)
NEUTROPHILS NFR BLD: 63.3 % (ref 44–72)
NRBC # BLD AUTO: 0 K/UL
NRBC BLD AUTO-RTO: 0 /100 WBC
PLATELET # BLD AUTO: 314 K/UL (ref 164–446)
PMV BLD AUTO: 9.4 FL (ref 9–12.9)
POTASSIUM SERPL-SCNC: 3.6 MMOL/L (ref 3.6–5.5)
PROT SERPL-MCNC: 6.9 G/DL (ref 6–8.2)
RBC # BLD AUTO: 4.9 M/UL (ref 4.2–5.4)
SODIUM SERPL-SCNC: 140 MMOL/L (ref 135–145)
T4 FREE SERPL-MCNC: 1.08 NG/DL (ref 0.53–1.43)
TSH SERPL DL<=0.005 MIU/L-ACNC: 0.05 UIU/ML (ref 0.3–3.7)
WBC # BLD AUTO: 10.6 K/UL (ref 4.8–10.8)

## 2017-10-20 PROCEDURE — 86803 HEPATITIS C AB TEST: CPT

## 2017-10-20 PROCEDURE — 87340 HEPATITIS B SURFACE AG IA: CPT

## 2017-10-20 PROCEDURE — 86706 HEP B SURFACE ANTIBODY: CPT

## 2017-10-20 PROCEDURE — 36415 COLL VENOUS BLD VENIPUNCTURE: CPT

## 2017-10-20 PROCEDURE — 86480 TB TEST CELL IMMUN MEASURE: CPT

## 2017-10-20 PROCEDURE — 84439 ASSAY OF FREE THYROXINE: CPT

## 2017-10-20 PROCEDURE — 85025 COMPLETE CBC W/AUTO DIFF WBC: CPT

## 2017-10-20 PROCEDURE — 86704 HEP B CORE ANTIBODY TOTAL: CPT

## 2017-10-20 PROCEDURE — 86812 HLA TYPING A B OR C: CPT

## 2017-10-20 PROCEDURE — 80053 COMPREHEN METABOLIC PANEL: CPT

## 2017-10-20 PROCEDURE — 84443 ASSAY THYROID STIM HORMONE: CPT

## 2017-10-22 LAB
HLA-B27 QL FC: NEGATIVE
M TB TUBERC IFN-G BLD QL: NEGATIVE
M TB TUBERC IFN-G/MITOGEN IGNF BLD: 0
M TB TUBERC IGNF/MITOGEN IGNF CONTROL: 56.13 [IU]/ML
MITOGEN IGNF BCKGRD COR BLD-ACNC: 0.02 [IU]/ML

## 2017-10-23 ENCOUNTER — TELEPHONE (OUTPATIENT)
Dept: SLEEP MEDICINE | Facility: MEDICAL CENTER | Age: 52
End: 2017-10-23

## 2017-10-24 NOTE — TELEPHONE ENCOUNTER
Patient has not yet received BPAP machine from [facility].  She's been waiting three weeks.

## 2017-10-25 NOTE — TELEPHONE ENCOUNTER
Called [facility], spoke with Osman.  The order has been received, but still pending authorization with [facility].  Osman will forward the message onto [facility] and contact patient once the BPAP has been approved by the insurance.    Called patient, left voicemail for patient to f/up with Nereyda and advised equipment likely to be approved this week.

## 2017-10-30 DIAGNOSIS — I10 ESSENTIAL HYPERTENSION: ICD-10-CM

## 2017-11-02 RX ORDER — LOSARTAN POTASSIUM 100 MG/1
TABLET ORAL
Qty: 90 TAB | Refills: 1 | Status: SHIPPED | OUTPATIENT
Start: 2017-11-02 | End: 2018-06-09 | Stop reason: SDUPTHER

## 2017-11-02 NOTE — TELEPHONE ENCOUNTER
Refill X 6 months, sent to pharmacy.Pt. Seen in the last 6 months per protocol.   Lab Results   Component Value Date/Time    SODIUM 140 10/20/2017 03:40 PM    POTASSIUM 3.6 10/20/2017 03:40 PM    CHLORIDE 105 10/20/2017 03:40 PM    CO2 24 10/20/2017 03:40 PM    GLUCOSE 100 (H) 10/20/2017 03:40 PM    BUN 21 10/20/2017 03:40 PM    CREATININE 0.64 10/20/2017 03:40 PM

## 2017-11-14 ENCOUNTER — OFFICE VISIT (OUTPATIENT)
Dept: ENDOCRINOLOGY | Facility: MEDICAL CENTER | Age: 52
End: 2017-11-14
Payer: COMMERCIAL

## 2017-11-14 VITALS
SYSTOLIC BLOOD PRESSURE: 118 MMHG | HEIGHT: 66 IN | WEIGHT: 293 LBS | OXYGEN SATURATION: 95 % | DIASTOLIC BLOOD PRESSURE: 78 MMHG | HEART RATE: 108 BPM | BODY MASS INDEX: 47.09 KG/M2

## 2017-11-14 DIAGNOSIS — E03.9 HYPOTHYROIDISM, UNSPECIFIED TYPE: ICD-10-CM

## 2017-11-14 DIAGNOSIS — E55.9 VITAMIN D DEFICIENCY: ICD-10-CM

## 2017-11-14 PROCEDURE — 99214 OFFICE O/P EST MOD 30 MIN: CPT | Performed by: INTERNAL MEDICINE

## 2017-11-14 RX ORDER — LEVOTHYROXINE SODIUM 300 UG/1
300 TABLET ORAL DAILY
Qty: 90 TAB | Refills: 3 | Status: SHIPPED | OUTPATIENT
Start: 2017-11-14

## 2017-11-14 RX ORDER — LIOTHYRONINE SODIUM 25 UG/1
25 TABLET ORAL DAILY
Qty: 30 TAB | Refills: 3 | Status: SHIPPED | OUTPATIENT
Start: 2017-11-14 | End: 2018-03-19 | Stop reason: SDUPTHER

## 2017-11-15 NOTE — PROGRESS NOTES
Endocrinology Clinic Progress Note  PCP: VALERIE Lopez    HPI:  Gabriella Clifford is a 52 y.o. old patient who comes in today for routine follow up. For hypothyroidism she is currently on 300 µg of levothyroxine daily. He continues to feel tired and have cold intolerance.    I've advised her to start using the CPAP for sleep apnea which she states that she cannot afford to pay for CPAP. I have advised her to shop online for some deals on CPAP machines.    ROS:  Constitutional: Fatigue, cold intolerance No unintentional weight loss  Endo: Denies excessive thirst or frequent urination  All other systems were reviewed and were negative.    Past Medical History:  Patient Active Problem List    Diagnosis Date Noted   • DENISE (obstructive sleep apnea)-presumed 06/22/2017   • Acquired hypothyroidism 06/22/2017   • Health care maintenance 04/24/2017   • Morbid obesity with BMI of 45.0-49.9, adult ([facility]) 03/14/2017   • Chronic cough 12/22/2015   • Urinary urgency 09/22/2015   • Burning pain 09/22/2015   • Graves disease 02/12/2015   • Subclinical hyperthyroidism 07/31/2014   • Multiple thyroid nodules 02/02/2014   • Joint pain 08/27/2013   • Urticaria, chronic 01/03/2013   • Hypertension 01/03/2013   • Asthma in adult 01/03/2013   • Shoulder pain 01/03/2013       Medications:    Current Outpatient Prescriptions:   •  liothyronine (CYTOMEL) 25 MCG Tab, Take 1 Tab by mouth every day., Disp: 30 Tab, Rfl: 3  •  levothyroxine (SYNTHROID) 300 MCG Tab, Take 1 Tab by mouth every day., Disp: 90 Tab, Rfl: 3  •  losartan (COZAAR) 100 MG Tab, TAKE 1 TABLET BY MOUTH EVERY DAY, Disp: 90 Tab, Rfl: 1  •  NIFEdipine (ADALAT CC) 60 MG CR tablet, Take 1 Tab by mouth every day., Disp: 30 Tab, Rfl: 2  •  hydroxychloroquine (PLAQUENIL) 200 MG Tab, TAKE 1 TABLET BY MOUTH TWICE DAILY, Disp: 60 Tab, Rfl: 5  •  tolterodine ER (DETROL-LA) 2 MG CAPSULE SR 24 HR, Take 1 Cap by mouth every day., Disp: 30 Cap, Rfl: 11  •  predniSONE  "(DELTASONE) 5 MG Tab, 15 mg po q day x 5 days then 10 mg po q day x 5 days then 5 mg po q day x 5 days then stop, Disp: 30 Tab, Rfl: 0  •  Diclofenac Sodium (VOLTAREN) 1 % Gel, Apply 1 Application to skin as directed 2 times a day as needed. (Patient not taking: Reported on 10/2/2017), Disp: 100 g, Rfl: 2  •  Multiple Vitamins-Minerals (ONE-A-DAY 50 PLUS PO), Take  by mouth., Disp: , Rfl:   •  ibuprofen (MOTRIN) 800 MG Tab, Take 1 Tab by mouth every 8 hours as needed for Mild Pain., Disp: 90 Tab, Rfl: 3  •  hydrocodone-acetaminophen (NORCO) 5-325 MG Tab per tablet, Take 1 Tab by mouth every 8 hours as needed., Disp: 60 Tab, Rfl: 0  •  Azelastine-Fluticasone (DYMISTA) 137-50 MCG/ACT Suspension, Spray 1 Spray in nose 2 Times a Day. (Patient not taking: Reported on 10/2/2017), Disp: 1 Bottle, Rfl: 0  •  Albuterol Sulfate (PROAIR HFA INH), Inhale  by mouth., Disp: , Rfl:     Labs: Reviewed    Physical Examination:  Vital signs: /78   Pulse (!) 108   Ht 1.676 m (5' 6\")   Wt (!) 139.1 kg (306 lb 9.6 oz)   LMP 09/11/2016   SpO2 95%   BMI 49.49 kg/m²  Body mass index is 49.49 kg/m².  General: No apparent distress, cooperative  Eyes: No scleral icterus, no discharge, normal eyelids  Neck: No abnormal masses on inspection, normal thyroid exam  Resp: Normal effort, clear to auscultation bilaterally  CVS: Regular rate and rhythm, S1 S2 normal, no murmur  Extremities: No lower extremity edema  Abdomen: abdominal obesity present  Musculoskeletal: Normal digits and nails  Skin: No rash on visible skin  Psych: Alert and oriented, normal mood and affect, intact memory and able to make informed decisions.    Assessment and Plan:    1. Hypothyroidism, unspecified type  Trial of-liothyronine (CYTOMEL) 25 MCG daily. Reduce levothyroxine to 300 µg daily on 5 days of the week only.    2. Vitamin D deficiency  She stopped taking 4000 units of daily vitamin D as it was creating upset stomach for her. I've advised her to take the " lowest tolerable dose of vitamin D as they're low and can contribute to fatigue.    3. FAtigue; multifactorial including hypothyroidism, vitamin D deficiency, sleep apnea and obesity. For sleep apnea I have advised her to consider using the CPAP as soon as possible. For Her obesity we will consider trial of phentermine in the next visit.    Return in about 2 months (around 1/14/2018).    Thank you for allowing me to participate in the care of this patient.    Afshin Guardado M.D.    CC:   [email or web address].    This note was created using voice recognition software (Dragon). The accuracy of the dictation is limited by the abilities of the software. I have reviewed the note prior to signing, however some errors in grammar and context are still possible. If you have any questions related to this note please do not hesitate to contact our office.

## 2017-12-03 ASSESSMENT — ENCOUNTER SYMPTOMS
CHILLS: 0
FEVER: 0

## 2017-12-04 NOTE — PROGRESS NOTES
Subjective:   Date of Consultation:   Primary care physician:VALERIE Lopez      Reason for Consultation:  Ms. Clifford  is a pleasant 51 y.o. year old female who presented with follow-up of a polyarthralgia        Seronegative rheumatoid arthritis  **At last visit we started methotrexate***She notes hand swelling and morning stiffness.  She feels the hand pain is worse.    Urticaria (autoimmune)  Quiescent  No new lesions    Low back pain  Not addressed today    RHEUM HISTORY:  Ms. Airam Clifford first presented to me with a history of autoimmune urticaria and elevated CRP.  She had been previously managed with plaquenil for her autoimmune urticaria as well as histamine blockers.  More recently to her initial visit she reports worsening hand pain and bilateral knee pain.  She had managed with 800 mg ibuprofen.  She also reported 1-2 hours of morning stiffness with swollen joint and stiff hands.  For her evaluation she had positive TPO antibody.  She was started on levothyroxin 6/2017.  In 4/2016 we started her on plaquenil 400 mg po q day.        Pertinent serologies  Rheumatoid factor is negative  Anti-CCP is negative    Results for AIRAM CLIFFORD (MRN [id number]) as of 8/3/2017 13:30   Ref. Range 4/18/2017 15:37 7/11/2017 11:59   Anti-Dna -Ds Latest Ref Range: None Detected  None Detected    Microsomal -Tpo- Abs Latest Ref Range: 0.0-9.0 IU/mL  11.1 (H)   Thyrotropin Receptor Abs Latest Ref Range: <=1.75 IU/L  2.53 (H)   Anti-Scl-70 Latest Ref Range: 0-40 AU/mL 0    Anti-Centromere B Ab Latest Ref Range: 0-40 AU/mL 2    Antinuclear Antibody Latest Ref Range: None Detected  None Detected    Rnp Antibodies Latest Ref Range: 0-40 AU/mL 0    Smith Antibodies Latest Ref Range: 0-40 AU/mL 0    SSA 52 (R0)(SANJUANA) Ab, IgG Latest Ref Range: 0-40 AU/mL 6    SSA 60 (R0)(SANJUANA) Ab, IgG Latest Ref Range: 0-40 AU/mL 14    Sjogren'S Anti-Ss-B Latest Ref Range: 0-40 AU/mL 1    ANCA IgG Latest Ref Range: <1:20  <1:20           Evaluation showed an elevated CRP of 1.76 and a elevated ESR of 20    Hand x-ray from March 16, 2017 showed no evidence of any marginal erosions or periosteal reaction.    Past medical history: Graves, autoimmune urticaria, asthma, high blood pressure diagnosed in 1995.  No miscarriages.  She did have HTN/toxemia during her second pregnancy.  She denies any blood clot history.  Adult onset asthma. She had jaw surgery in middle school. In the 1990s she had tubal ligation. 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      Family history: Rheumatoid arthritis, mom and great aunt had arthritis. First cousin had thyroid Hashimoto's. Father had brain aneurysm.    Social history: Former smoker (social smoker once a week if any), occasional alcohol use. No IV drug use.    Past Medical History:   Diagnosis Date   • Bursitis of shoulder, left    • Chickenpox    • [place] measles    • Graves disease    • Hypertension    • Rheumatoid arthritis(714.0)    • Rotator cuff tear, left    • Seasonal allergies    • Urticaria, chronic 1/3/2013     Past Surgical History:   Procedure Laterality Date   • CARPAL TUNNEL RELEASE     • CARPAL TUNNEL RELEASE     • CUBITAL TUNNEL RELEASE     • OTHER      jaw surgery   • ROTATOR CUFF REPAIR      left   • SHOULDER SURGERY     • TUBAL COAGULATION LAPAROSCOPIC BILATERAL       Allergies   Allergen Reactions   • Erythromycin    • Keflex    • Pcn [Penicillins]      Outpatient Encounter Prescriptions as of 12/6/2017   Medication Sig Dispense Refill   • liothyronine (CYTOMEL) 25 MCG Tab Take 1 Tab by mouth every day. 30 Tab 3   • levothyroxine (SYNTHROID) 300 MCG Tab Take 1 Tab by mouth every day. 90 Tab 3   • losartan (COZAAR) 100 MG Tab TAKE 1 TABLET BY MOUTH EVERY DAY 90 Tab 1   • predniSONE (DELTASONE) 5 MG Tab 15 mg po q day x 5 days then 10 mg po q day x 5 days then 5 mg po q day x 5 days then stop 30 Tab 0   • NIFEdipine (ADALAT  CC) 60 MG CR tablet Take 1 Tab by mouth every day. 30 Tab 2   • Diclofenac Sodium (VOLTAREN) 1 % Gel Apply 1 Application to skin as directed 2 times a day as needed. (Patient not taking: Reported on 10/2/2017) 100 g 2   • hydroxychloroquine (PLAQUENIL) 200 MG Tab TAKE 1 TABLET BY MOUTH TWICE DAILY 60 Tab 5   • Multiple Vitamins-Minerals (ONE-A-DAY 50 PLUS PO) Take  by mouth.     • tolterodine ER (DETROL-LA) 2 MG CAPSULE SR 24 HR Take 1 Cap by mouth every day. 30 Cap 11   • ibuprofen (MOTRIN) 800 MG Tab Take 1 Tab by mouth every 8 hours as needed for Mild Pain. 90 Tab 3   • hydrocodone-acetaminophen (NORCO) 5-325 MG Tab per tablet Take 1 Tab by mouth every 8 hours as needed. 60 Tab 0   • Azelastine-Fluticasone (DYMISTA) 137-50 MCG/ACT Suspension Spray 1 Spray in nose 2 Times a Day. (Patient not taking: Reported on 10/2/2017) 1 Bottle 0   • Albuterol Sulfate (PROAIR HFA INH) Inhale  by mouth.       No facility-administered encounter medications on file as of 12/6/2017.        Social History     Social History   • Marital status:      Spouse name: N/A   • Number of children: N/A   • Years of education: N/A     Occupational History   • Not on file.     Social History Main Topics   • Smoking status: Former Smoker     Packs/day: 0.25     Types: Cigarettes     Quit date: 6/22/2015   • Smokeless tobacco: Never Used   • Alcohol use 0.0 oz/week      Comment: Occassionally   • Drug use: No   • Sexual activity: Yes     Other Topics Concern   • Not on file     Social History Narrative   • No narrative on file      History   Smoking Status   • Former Smoker   • Packs/day: 0.25   • Types: Cigarettes   • Quit date: 6/22/2015   Smokeless Tobacco   • Never Used     History   Alcohol Use   • 0.0 oz/week     Comment: Occassionally     History   Drug Use No      OB History   No data available      Patient's last menstrual period was 09/11/2016.    USHA ALEXANDRE     Family History   Problem Relation Age of Onset   • Arthritis Mother       RA   • Cancer Mother      breast   • Hypertension Father    • Cancer Maternal Grandmother        Review of Systems   Constitutional: Negative for chills and fever.   Musculoskeletal: Positive for joint pain.   Skin: Negative for rash.   Neurological:        Tingling on the hands        Objective:   LMP 09/11/2016     Physical Exam   Constitutional: She is oriented to person, place, and time. She appears well-developed and well-nourished. No distress.   HENT:   Head: Normocephalic and atraumatic.   Right Ear: External ear normal.   Left Ear: External ear normal.   Eyes: Conjunctivae are normal. Right eye exhibits no discharge. Left eye exhibits no discharge.   Cardiovascular: Normal rate, regular rhythm and normal heart sounds.    Pulmonary/Chest: No stridor. No respiratory distress. She has no wheezes. She has no rales.   Musculoskeletal: She exhibits no edema.   Right wrist swelling and the mild bogginess of the MCP and PIP.  MTP tenderness bilateral   Neurological: She is alert and oriented to person, place, and time. No cranial nerve deficit.   Skin: Skin is warm and dry. No rash noted. She is not diaphoretic. No erythema. No pallor.   Psychiatric: She has a normal mood and affect. Her behavior is normal. Judgment and thought content normal.       Assessment:     No diagnosis found.  Labs:      Lab Results   Component Value Date/Time    QNTTBGOLD Negative 10/20/2017 03:40 PM     Lab Results   Component Value Date/Time    HEPBCORTOT Negative 10/20/2017 03:40 PM    HEPBSAG Negative 10/20/2017 03:40 PM     Lab Results   Component Value Date/Time    HEPCAB Negative 10/20/2017 03:40 PM     Lab Results   Component Value Date/Time    SODIUM 140 10/20/2017 03:40 PM    POTASSIUM 3.6 10/20/2017 03:40 PM    CHLORIDE 105 10/20/2017 03:40 PM    CO2 24 10/20/2017 03:40 PM    GLUCOSE 100 (H) 10/20/2017 03:40 PM    BUN 21 10/20/2017 03:40 PM    CREATININE 0.64 10/20/2017 03:40 PM      Lab Results   Component Value Date/Time     WBC 10.6 10/20/2017 03:40 PM    RBC 4.90 10/20/2017 03:40 PM    HEMOGLOBIN 14.5 10/20/2017 03:40 PM    HEMATOCRIT 42.8 10/20/2017 03:40 PM    MCV 87.3 10/20/2017 03:40 PM    MCH 29.6 10/20/2017 03:40 PM    MCHC 33.9 10/20/2017 03:40 PM    MPV 9.4 10/20/2017 03:40 PM    NEUTSPOLYS 63.30 10/20/2017 03:40 PM    LYMPHOCYTES 26.50 10/20/2017 03:40 PM    MONOCYTES 6.70 10/20/2017 03:40 PM    EOSINOPHILS 2.70 10/20/2017 03:40 PM    BASOPHILS 0.50 10/20/2017 03:40 PM    HYPOCHROMIA 1+ 07/08/2014 09:03 AM      Lab Results   Component Value Date/Time    CALCIUM 9.1 10/20/2017 03:40 PM    ASTSGOT 12 10/20/2017 03:40 PM    ALTSGPT 15 10/20/2017 03:40 PM    ALKPHOSPHAT 49 10/20/2017 03:40 PM    TBILIRUBIN 0.2 10/20/2017 03:40 PM    ALBUMIN 3.8 10/20/2017 03:40 PM    TOTPROTEIN 6.9 10/20/2017 03:40 PM     Lab Results   Component Value Date/Time    URICACID 3.1 04/18/2017 03:37 PM    RHEUMFACTN <10 03/14/2017 08:14 AM    CCPANTIBODY 3 03/14/2017 08:14 AM    ANTINUCAB None Detected 04/18/2017 03:37 PM     Lab Results   Component Value Date/Time    SEDRATEWES 16 07/11/2017 11:59 AM    CREACTPROT 1.51 (H) 07/11/2017 11:59 AM     No results found for: RUSSELVIPER, DRVVTINTERP  Lab Results   Component Value Date/Time    G1IRQTQUYQM 184.0 04/18/2017 03:37 PM    I4SSBIKXQDX 48.0 04/18/2017 03:37 PM     Lab Results   Component Value Date/Time    ANTIDNADS None Detected 04/18/2017 03:37 PM    RNPAB 0 04/18/2017 03:37 PM    SMITHAB 0 04/18/2017 03:37 PM    EETCDAR40 0 04/18/2017 03:37 PM    CENTROMBAB 2 04/18/2017 03:37 PM     Lab Results   Component Value Date/Time    ANTIDNADS None Detected 04/18/2017 03:37 PM    ANCAIGG <1:20 04/18/2017 03:37 PM    L0SBNIDVPBF 184.0 04/18/2017 03:37 PM    RNPAB 0 04/18/2017 03:37 PM    ANTISSBSJ 1 04/18/2017 03:37 PM     Lab Results   Component Value Date/Time    COLORURINE Lt. Yellow 04/18/2017 03:37 PM    SPECGRAVITY 1.021 04/18/2017 03:37 PM    PHURINE 5.5 04/18/2017 03:37 PM    GLUCOSEUR Negative  04/18/2017 03:37 PM    KETONES Negative 04/18/2017 03:37 PM    PROTEINURIN Negative 04/18/2017 03:37 PM     No results found for: TOTPROTUR, TOTALVOLUME, AETNRXFM20  Lab Results   Component Value Date/Time    SSA60 14 04/18/2017 03:37 PM    SSA52 6 04/18/2017 03:37 PM     Lab Results   Component Value Date/Time    HBA1C 5.3 01/18/2013 10:59 AM     No results found for: CPKTOTAL  Lab Results   Component Value Date/Time    G6PD 12.6 04/18/2017 03:37 PM     Lab Results   Component Value Date/Time    GJNI41CCOL Negative 10/20/2017 03:40 PM     No results found for: ACESERUM  Lab Results   Component Value Date/Time    25HYDROXY 26 (L) 05/08/2017 03:17 PM     No results found for: TSH, FREEDIR  Lab Results   Component Value Date/Time    TSHULTRASEN 0.050 (L) 10/20/2017 03:42 PM    FREET4 1.08 10/20/2017 03:42 PM     Lab Results   Component Value Date/Time    MICROSOMALA 11.1 (H) 07/11/2017 11:59 AM    ANTITHYROGL <0.9 01/03/2014 10:40 AM     No results found for: IGGLYMEABS  No results found for: ANTIMITOCHO, FACTIN  No results found for: IGA, TTRANSIGA, ENDOIGA  No results found for: FLTYPE, CRYSTALSBDF  No results found for: ISTATICAL  No results found for: ISTATCREAT  No results found for: CTELOPEP  No results found for: GBMABG  No results found for: PTHINTACT      Medical Decision Making:  Today's Assessment / Status / Plan:     Seronegative rheumatoid arthritis  Swelling of the MCP and wrist.  She is unresponsive to plaquenil.  We will move forward and try plaquenil.  I will provide a course of steroids.  I will get basic labs and start methotrexate at 5 tabs weekly.    I reviewed the side effects of prednisone including but not limited to hypertension, glaucoma, diabetes and osteoporosis.  I have advised him to take daily calcium and vitamin D.      I reviewed the side effects of methotrexate including but not limited to hepatotoxicity, nausea, hair loss, the importance of taking folic acid, and close monitoring of  his labs.          Low back pain  She has features suggestive of an inflammatory pain  Will get lumbar spine xray  Will get sacroiliac xray  Will check HLA B27   I have asked the patient to get labs HLA B27 as well    Autoimmune urticaria  For now stable  Continue plaquenil      Tingling - peripheral neuropathy???  Will check B12 normal  Folate is normal    No diagnosis found.  No Follow-up on file.      She agreed and verbalized her agreement and understanding with the current plan. I answered all questions and concerns she has at this time and advised her to call at any time in there interim with questions or concerns in regards to her care.    Thank you for allowing me to participate in her care, I will continue to follow closely.

## 2017-12-06 ENCOUNTER — APPOINTMENT (OUTPATIENT)
Dept: RHEUMATOLOGY | Facility: PHYSICIAN GROUP | Age: 52
End: 2017-12-06
Payer: COMMERCIAL

## 2017-12-12 ENCOUNTER — OFFICE VISIT (OUTPATIENT)
Dept: URGENT CARE | Facility: PHYSICIAN GROUP | Age: 52
End: 2017-12-12
Payer: COMMERCIAL

## 2017-12-12 VITALS
DIASTOLIC BLOOD PRESSURE: 86 MMHG | HEART RATE: 108 BPM | TEMPERATURE: 98.8 F | SYSTOLIC BLOOD PRESSURE: 128 MMHG | OXYGEN SATURATION: 95 % | WEIGHT: 293 LBS | HEIGHT: 66 IN | RESPIRATION RATE: 16 BRPM | BODY MASS INDEX: 47.09 KG/M2

## 2017-12-12 DIAGNOSIS — E66.01 MORBID OBESITY WITH BMI OF 45.0-49.9, ADULT (HCC): ICD-10-CM

## 2017-12-12 DIAGNOSIS — N39.0 ACUTE URINARY TRACT INFECTION: ICD-10-CM

## 2017-12-12 LAB
APPEARANCE UR: NORMAL
BILIRUB UR STRIP-MCNC: NORMAL MG/DL
COLOR UR AUTO: NORMAL
GLUCOSE UR STRIP.AUTO-MCNC: NORMAL MG/DL
KETONES UR STRIP.AUTO-MCNC: NORMAL MG/DL
LEUKOCYTE ESTERASE UR QL STRIP.AUTO: NORMAL
NITRITE UR QL STRIP.AUTO: NORMAL
PH UR STRIP.AUTO: 6 [PH] (ref 5–8)
PROT UR QL STRIP: NORMAL MG/DL
RBC UR QL AUTO: NORMAL
SP GR UR STRIP.AUTO: 1.01
UROBILINOGEN UR STRIP-MCNC: NORMAL MG/DL

## 2017-12-12 PROCEDURE — 81002 URINALYSIS NONAUTO W/O SCOPE: CPT | Performed by: FAMILY MEDICINE

## 2017-12-12 PROCEDURE — 99214 OFFICE O/P EST MOD 30 MIN: CPT | Performed by: FAMILY MEDICINE

## 2017-12-12 RX ORDER — SULFAMETHOXAZOLE AND TRIMETHOPRIM 800; 160 MG/1; MG/1
TABLET ORAL
Qty: 14 TAB | Refills: 0 | Status: SHIPPED | OUTPATIENT
Start: 2017-12-12 | End: 2018-02-08

## 2017-12-12 NOTE — PROGRESS NOTES
Chief Complaint:    Chief Complaint   Patient presents with   • UTI       History of Present Illness:    This is a new problem. Symptoms x 7 days. Has dysuria, urinary frequency, urinary urgency, and discomfort in bladder region. She did a Telemed visit 1 week ago and was rx'd Nitrofurantoin. She seemed to improve for 1 day, then got worse and still having persisting symptoms. Was treated for UTI with Bactrim on 4/10/14 which worked/was tolerated.       Review of Systems:    Constitutional: Negative for fever, chills, and diaphoresis.   Eyes: Negative for change in vision, photophobia, pain, redness, and discharge.  ENT: Negative for ear pain, ear discharge, hearing loss, tinnitus, nasal congestion, nosebleeds, and sore throat.    Respiratory: Negative for cough, hemoptysis, sputum production, shortness of breath, wheezing, and stridor.    Cardiovascular: Negative for chest pain, palpitations, orthopnea, claudication, leg swelling, and PND.   Gastrointestinal: Negative for abdominal pain, nausea, vomiting, diarrhea, constipation, blood in stool, and melena.   Genitourinary: See HPI.  Musculoskeletal: No new symptoms.   Skin: Negative for rash and itching.   Neurological: Negative for dizziness, tingling, tremors, sensory change, speech change, focal weakness, seizures, loss of consciousness, and headaches.   Endo: Hypothyroid, on medication.   Heme: Does not bruise/bleed easily.   Psychiatric/Behavioral: Negative for depression, suicidal ideas, hallucinations, memory loss and substance abuse. The patient is not nervous/anxious and does not have insomnia.      Past Medical History:    Past Medical History:   Diagnosis Date   • Bursitis of shoulder, left    • Chickenpox    • [place] measles    • Graves disease    • Hypertension    • Rheumatoid arthritis(714.0)    • Rotator cuff tear, left    • Seasonal allergies    • Urticaria, chronic 1/3/2013       Past Surgical History:    Past Surgical History:   Procedure  Laterality Date   • CARPAL TUNNEL RELEASE     • CARPAL TUNNEL RELEASE     • CUBITAL TUNNEL RELEASE     • OTHER      jaw surgery   • ROTATOR CUFF REPAIR      left   • SHOULDER SURGERY     • TUBAL COAGULATION LAPAROSCOPIC BILATERAL         Social History:    Social History     Social History   • Marital status:      Spouse name: N/A   • Number of children: N/A   • Years of education: N/A     Occupational History   • Not on file.     Social History Main Topics   • Smoking status: Former Smoker     Packs/day: 0.25     Types: Cigarettes     Quit date: 6/22/2015   • Smokeless tobacco: Never Used   • Alcohol use 0.0 oz/week      Comment: Occassionally   • Drug use: No   • Sexual activity: Yes     Other Topics Concern   • Not on file     Social History Narrative   • No narrative on file       Family History:    Family History   Problem Relation Age of Onset   • Arthritis Mother      RA   • Cancer Mother      breast   • Hypertension Father    • Cancer Maternal Grandmother        Medications:    Current Outpatient Prescriptions on File Prior to Visit   Medication Sig Dispense Refill   • liothyronine (CYTOMEL) 25 MCG Tab Take 1 Tab by mouth every day. 30 Tab 3   • levothyroxine (SYNTHROID) 300 MCG Tab Take 1 Tab by mouth every day. 90 Tab 3   • losartan (COZAAR) 100 MG Tab TAKE 1 TABLET BY MOUTH EVERY DAY 90 Tab 1   • NIFEdipine (ADALAT CC) 60 MG CR tablet Take 1 Tab by mouth every day. 30 Tab 2   • hydroxychloroquine (PLAQUENIL) 200 MG Tab TAKE 1 TABLET BY MOUTH TWICE DAILY 60 Tab 5   • Multiple Vitamins-Minerals (ONE-A-DAY 50 PLUS PO) Take  by mouth.     • tolterodine ER (DETROL-LA) 2 MG CAPSULE SR 24 HR Take 1 Cap by mouth every day. 30 Cap 11   • ibuprofen (MOTRIN) 800 MG Tab Take 1 Tab by mouth every 8 hours as needed for Mild Pain. 90 Tab 3   • Albuterol Sulfate (PROAIR HFA INH) Inhale  by mouth.       No current facility-administered medications on file prior to visit.        Allergies:    Allergies   Allergen  "Reactions   • Erythromycin    • Keflex    • Pcn [Penicillins]        Vitals:    Vitals:    12/12/17 1523   BP: 128/86   Pulse: (!) 108   Resp: 16   Temp: 37.1 °C (98.8 °F)   SpO2: 95%   Weight: (!) 139.7 kg (308 lb)   Height: 1.676 m (5' 6\")       Physical Exam:    Constitutional: Vital signs reviewed. Appears well-developed and well-nourished. No acute distress.   Eyes: Sclera white, conjunctivae clear.   ENT: External ears normal. Hearing normal.  Neck: Neck supple.   Pulmonary/Chest: Respirations non-labored.   Abdomen: Bowel sounds are normal active. Soft, non-distended, and non-tender to palpation.    Musculoskeletal: No CVA TTP bilaterally. Normal gait. Normal range of motion. No muscular atrophy or weakness.  Neurological: Alert and oriented to person, place, and time. Muscle tone normal. Coordination normal. Light touch and sensation normal.   Skin: No rashes or lesions. Warm, dry, normal turgor.  Psychiatric: Normal mood and affect. Behavior is normal. Judgment and thought content normal.     Diagnostics:    POCT URINALYSIS (Order #[id number]) on 12/12/17   Component Results     Component Value Ref Range & Units Status   POC Color  Negative    POC Appearance  Negative    POC Leukocyte Esterase Neg Negative Final   POC Nitrites Neg Negative Final   POC Urobiligen Neg Negative (0.2) mg/dL Final   POC Protein Neg Negative mg/dL Final   POC Urine PH 6 5.0 - 8.0 Final   POC Blood Neg Negative Final   POC Specific Gravity 1.015 <1.005 - >1.030 Final   POC Ketones Neg Negative mg/dL Final   POC Biliruben Neg Negative mg/dL Final   POC Glucose Neg Negative mg/dL Final   Last Resulted Time   Tue Dec 12, 2017  3:50 PM       Assessment / Plan:    1. Acute urinary tract infection  - POCT Urinalysis  - sulfamethoxazole-trimethoprim (BACTRIM DS) 800-160 MG tablet; 1 TAB TWICE X 7 DAYS.  Dispense: 14 Tab; Refill: 0    2. Morbid obesity with BMI of 45.0-49.9, adult (HCC)  - Patient identified as having weight management " issue.  Appropriate orders and counseling given.      Discussed with her limitations of U. dip, DDX, and management options.    Despite negative U. dip, she has all the right symptoms for UTI and I recommended to treat for UTI. She agrees.    Agreeable to medication prescribed.    Declines Urine Culture.    Follow-up with PCP or urgent care if getting worse or not better with above.

## 2017-12-28 ENCOUNTER — HOSPITAL ENCOUNTER (OUTPATIENT)
Dept: LAB | Facility: MEDICAL CENTER | Age: 52
End: 2017-12-28
Attending: INTERNAL MEDICINE
Payer: COMMERCIAL

## 2017-12-28 DIAGNOSIS — E03.9 HYPOTHYROIDISM, UNSPECIFIED TYPE: ICD-10-CM

## 2017-12-28 LAB
T3 SERPL-MCNC: 134.5 NG/DL (ref 60–181)
T4 FREE SERPL-MCNC: 0.94 NG/DL (ref 0.53–1.43)
TSH SERPL DL<=0.005 MIU/L-ACNC: <0.005 UIU/ML (ref 0.38–5.33)

## 2017-12-28 PROCEDURE — 36415 COLL VENOUS BLD VENIPUNCTURE: CPT

## 2017-12-28 PROCEDURE — 84439 ASSAY OF FREE THYROXINE: CPT

## 2017-12-28 PROCEDURE — 84443 ASSAY THYROID STIM HORMONE: CPT

## 2017-12-28 PROCEDURE — 84480 ASSAY TRIIODOTHYRONINE (T3): CPT

## 2018-01-17 ENCOUNTER — OFFICE VISIT (OUTPATIENT)
Dept: ENDOCRINOLOGY | Facility: MEDICAL CENTER | Age: 53
End: 2018-01-17
Payer: COMMERCIAL

## 2018-01-17 VITALS
HEART RATE: 108 BPM | HEIGHT: 66 IN | WEIGHT: 293 LBS | BODY MASS INDEX: 47.09 KG/M2 | DIASTOLIC BLOOD PRESSURE: 60 MMHG | SYSTOLIC BLOOD PRESSURE: 120 MMHG | OXYGEN SATURATION: 94 %

## 2018-01-17 DIAGNOSIS — E55.9 VITAMIN D DEFICIENCY: ICD-10-CM

## 2018-01-17 DIAGNOSIS — E03.9 HYPOTHYROIDISM, UNSPECIFIED TYPE: ICD-10-CM

## 2018-01-17 DIAGNOSIS — E66.01 MORBID OBESITY WITH BMI OF 45.0-49.9, ADULT (HCC): ICD-10-CM

## 2018-01-17 PROCEDURE — 99214 OFFICE O/P EST MOD 30 MIN: CPT | Performed by: INTERNAL MEDICINE

## 2018-01-17 NOTE — PROGRESS NOTES
Endocrinology Clinic Progress Note  PCP: VALERIE Lopez    HPI:  Gabriella Clifford is a 52 y.o. old patient who comes in today for routine follow up.   Hypothyroidism: Currently she is on 100 µg of levothyroxine in 5 days of the week. She is also on Cytomel 25 µg daily and all 7 days of the week. She states that her energy levels are significantly better and adding Cytomel and has made a tremendous difference. She does describe difficulty losing weight.    ROS:  Constitutional: No unintentional weight loss  Endo: Denies excessive thirst or frequent urination  All other systems were reviewed and were negative.    Past Medical History:  Patient Active Problem List    Diagnosis Date Noted   • DENISE (obstructive sleep apnea)-presumed 06/22/2017   • Acquired hypothyroidism 06/22/2017   • Health care maintenance 04/24/2017   • Morbid obesity with BMI of 45.0-49.9, adult ([facility]) 03/14/2017   • Chronic cough 12/22/2015   • Urinary urgency 09/22/2015   • Burning pain 09/22/2015   • Graves disease 02/12/2015   • Subclinical hyperthyroidism 07/31/2014   • Multiple thyroid nodules 02/02/2014   • Joint pain 08/27/2013   • Urticaria, chronic 01/03/2013   • Hypertension 01/03/2013   • Asthma in adult 01/03/2013   • Shoulder pain 01/03/2013       Medications:    Current Outpatient Prescriptions:   •  Probiotic Product (PROBIOTIC PO), Take 1 Tab by mouth every day., Disp: , Rfl:   •  liothyronine (CYTOMEL) 25 MCG Tab, Take 1 Tab by mouth every day., Disp: 30 Tab, Rfl: 3  •  levothyroxine (SYNTHROID) 300 MCG Tab, Take 1 Tab by mouth every day., Disp: 90 Tab, Rfl: 3  •  losartan (COZAAR) 100 MG Tab, TAKE 1 TABLET BY MOUTH EVERY DAY, Disp: 90 Tab, Rfl: 1  •  NIFEdipine (ADALAT CC) 60 MG CR tablet, Take 1 Tab by mouth every day., Disp: 30 Tab, Rfl: 2  •  Multiple Vitamins-Minerals (ONE-A-DAY 50 PLUS PO), Take  by mouth., Disp: , Rfl:   •  tolterodine ER (DETROL-LA) 2 MG CAPSULE SR 24 HR, Take 1 Cap by mouth every day.,  "Disp: 30 Cap, Rfl: 11  •  sulfamethoxazole-trimethoprim (BACTRIM DS) 800-160 MG tablet, 1 TAB TWICE X 7 DAYS., Disp: 14 Tab, Rfl: 0  •  hydroxychloroquine (PLAQUENIL) 200 MG Tab, TAKE 1 TABLET BY MOUTH TWICE DAILY, Disp: 60 Tab, Rfl: 5  •  ibuprofen (MOTRIN) 800 MG Tab, Take 1 Tab by mouth every 8 hours as needed for Mild Pain., Disp: 90 Tab, Rfl: 3  •  Albuterol Sulfate (PROAIR HFA INH), Inhale  by mouth., Disp: , Rfl:     Labs: Reviewed    Physical Examination:  Vital signs: /60   Pulse (!) 108   Ht 1.676 m (5' 5.98\")   Wt (!) 138.7 kg (305 lb 11.2 oz)   LMP 09/11/2016   SpO2 94%   BMI 49.37 kg/m²  Body mass index is 49.37 kg/m².  General: No apparent distress, cooperative  Eyes: No scleral icterus, no discharge, normal eyelids  Neck: No abnormal masses on inspection, normal thyroid exam  Resp: Normal effort, clear to auscultation bilaterally  CVS: Regular rate and rhythm, S1 S2 normal, no murmur  Extremities: No lower extremity edema  Abdomen: abdominal obesity present  Musculoskeletal: Normal digits and nails  Skin: No rash on visible skin  Psych: Alert and oriented, normal mood and affect, intact memory and able to make informed decisions.    Assessment and Plan:    1. Hypothyroidism, unspecified type  biochemically she is still hyperthyroid; reduce cytomel to 5 days of the week only.     2. Vitamin D deficiency  In the past she has been unable to tolerate 50,000 units of vitamin D. She is also unable to tolerate 1000 to 2000 units over-the-counter vitamin D. The only vitamin D that she is currently taking is the one that is a part of her multivitamin tablet.    3. Morbid obesity with BMI of 45.0-49.9, adult (HCC)   patient to enquire if saxenda or contrave is covered by her insurance for weight loss.     Return in about 7 weeks (around 3/7/2018).    Thank you for allowing me to participate in the care of this patient.    Afshin Guardado M.D.    CC:   IVAN LopezRZITA.    This note was " created using voice recognition software (Dragon). The accuracy of the dictation is limited by the abilities of the software. I have reviewed the note prior to signing, however some errors in grammar and context are still possible. If you have any questions related to this note please do not hesitate to contact our office.

## 2018-02-03 ASSESSMENT — ENCOUNTER SYMPTOMS
CHILLS: 0
FEVER: 0

## 2018-02-04 NOTE — PROGRESS NOTES
Subjective:   Date of Consultation:2/8/2018  1:13 PM  Primary care physician:VALERIE Lopez      Reason for Consultation:  Ms. Clifford  is a pleasant 51 y.o. year old female who presented with follow-up of a polyarthralgia    24 and Me  The variant tested is a change from a C to a T in the DNA sequence of the HLA-DQA1 gene. 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  We planned to start methotrexate but had not started yet.  She overall is managing her pain and swelling.  She continues to have symptoms of neuropathy.    Urticaria (autoimmune)  Quiescent  No new lesions    Low back pain  Not addressed today    RHEUM HISTORY:  Ms. Airam Clifford first presented to me with a history of autoimmune urticaria and elevated CRP.  She had been previously managed with plaquenil for her autoimmune urticaria as well as histamine blockers.  More recently to her initial visit she reports worsening hand pain and bilateral knee pain.  She had managed with 800 mg ibuprofen.  She also reported 1-2 hours of morning stiffness with swollen joint and stiff hands.  For her evaluation she had positive TPO antibody.  She was started on levothyroxin 6/2017.  In 4/2016 we started her on plaquenil 400 mg po q day.        Pertinent serologies  Rheumatoid factor is negative  Anti-CCP is negative    Results for AIRAM CLIFFORD (MRN [id number]) as of 8/3/2017 13:30   Ref. Range 4/18/2017 15:37 7/11/2017 11:59   Anti-Dna -Ds Latest Ref Range: None Detected  None Detected    Microsomal -Tpo- Abs Latest Ref Range: 0.0-9.0 IU/mL  11.1 (H)   Thyrotropin Receptor Abs Latest Ref Range: <=1.75 IU/L  2.53 (H)   Anti-Scl-70 Latest Ref Range: 0-40 AU/mL 0    Anti-Centromere B Ab Latest Ref Range: 0-40 AU/mL 2    Antinuclear Antibody Latest Ref Range: None Detected  None Detected    Rnp Antibodies Latest Ref  Range: 0-40 AU/mL 0    Smith Antibodies Latest Ref Range: 0-40 AU/mL 0    SSA 52 (R0)(SANJUANA) Ab, IgG Latest Ref Range: 0-40 AU/mL 6    SSA 60 (R0)(SANJUANA) Ab, IgG Latest Ref Range: 0-40 AU/mL 14    Sjogren'S Anti-Ss-B Latest Ref Range: 0-40 AU/mL 1    ANCA IgG Latest Ref Range: <1:20  <1:20      Evaluation showed an elevated CRP of 1.76 and a elevated ESR of 20    Hand x-ray from March 16, 2017 showed no evidence of any marginal erosions or periosteal reaction.    Past medical history: Graves, autoimmune urticaria, asthma, high blood pressure diagnosed in 1995.  No miscarriages.  She did have HTN/toxemia during her second pregnancy.  She denies any blood clot history.  Adult onset asthma. She had jaw surgery in middle school. In the 1990s she had tubal ligation. 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      Family history: Rheumatoid arthritis, mom and great aunt had arthritis. First cousin had thyroid Hashimoto's. Father had brain aneurysm.    Social history: Former smoker (social smoker once a week if any), occasional alcohol use. No IV drug use.    Past Medical History:   Diagnosis Date   • Bursitis of shoulder, left    • Chickenpox    • French measles    • Graves disease    • Hypertension    • Rheumatoid arthritis(714.0)    • Rotator cuff tear, left    • Seasonal allergies    • Urticaria, chronic 1/3/2013     Past Surgical History:   Procedure Laterality Date   • CARPAL TUNNEL RELEASE     • CARPAL TUNNEL RELEASE     • CUBITAL TUNNEL RELEASE     • OTHER      jaw surgery   • ROTATOR CUFF REPAIR      left   • SHOULDER SURGERY     • TUBAL COAGULATION LAPAROSCOPIC BILATERAL       Allergies   Allergen Reactions   • Erythromycin    • Keflex    • Pcn [Penicillins]      Outpatient Encounter Prescriptions as of 2/8/2018   Medication Sig Dispense Refill   • Probiotic Product (PROBIOTIC PO) Take 1 Tab by mouth every day.     • liothyronine (CYTOMEL) 25 MCG Tab  Take 1 Tab by mouth every day. 30 Tab 3   • levothyroxine (SYNTHROID) 300 MCG Tab Take 1 Tab by mouth every day. 90 Tab 3   • losartan (COZAAR) 100 MG Tab TAKE 1 TABLET BY MOUTH EVERY DAY 90 Tab 1   • NIFEdipine (ADALAT CC) 60 MG CR tablet Take 1 Tab by mouth every day. 30 Tab 2   • Multiple Vitamins-Minerals (ONE-A-DAY 50 PLUS PO) Take  by mouth.     • tolterodine ER (DETROL-LA) 2 MG CAPSULE SR 24 HR Take 1 Cap by mouth every day. 30 Cap 11   • ibuprofen (MOTRIN) 800 MG Tab Take 1 Tab by mouth every 8 hours as needed for Mild Pain. 90 Tab 3   • [DISCONTINUED] sulfamethoxazole-trimethoprim (BACTRIM DS) 800-160 MG tablet 1 TAB TWICE X 7 DAYS. 14 Tab 0   • [DISCONTINUED] hydroxychloroquine (PLAQUENIL) 200 MG Tab TAKE 1 TABLET BY MOUTH TWICE DAILY 60 Tab 5   • Albuterol Sulfate (PROAIR HFA INH) Inhale  by mouth.       No facility-administered encounter medications on file as of 2/8/2018.        Social History     Social History   • Marital status:      Spouse name: N/A   • Number of children: N/A   • Years of education: N/A     Occupational History   • Not on file.     Social History Main Topics   • Smoking status: Former Smoker     Packs/day: 0.25     Types: Cigarettes     Quit date: 6/22/2015   • Smokeless tobacco: Never Used   • Alcohol use 0.0 oz/week      Comment: Occassionally   • Drug use: No   • Sexual activity: Yes     Other Topics Concern   • Not on file     Social History Narrative   • No narrative on file      History   Smoking Status   • Former Smoker   • Packs/day: 0.25   • Types: Cigarettes   • Quit date: 6/22/2015   Smokeless Tobacco   • Never Used     History   Alcohol Use   • 0.0 oz/week     Comment: Occassionally     History   Drug Use No      OB History   No data available      Patient's last menstrual period was 09/11/2016.    USHA P A BETTIE     Family History   Problem Relation Age of Onset   • Arthritis Mother      RA   • Cancer Mother      breast   • Hypertension Father    • Cancer Maternal  Grandmother        Review of Systems   Constitutional: Negative for chills and fever.   Cardiovascular: Negative for chest pain.   Musculoskeletal: Positive for joint pain.   Skin: Negative for rash.        Objective:   /60   Pulse 92   Temp 37 °C (98.6 °F)   Resp 14   Wt (!) 140.2 kg (309 lb)   LMP 09/11/2016   SpO2 93%   BMI 49.90 kg/m²     Physical Exam   Constitutional: She is oriented to person, place, and time. She appears well-developed and well-nourished. No distress.   HENT:   Head: Normocephalic and atraumatic.   Right Ear: External ear normal.   Left Ear: External ear normal.   Eyes: Conjunctivae are normal. Right eye exhibits no discharge. Left eye exhibits no discharge.   Pulmonary/Chest: No stridor. No respiratory distress.   Musculoskeletal: She exhibits no edema.   No active synovitis of the upper extremities   Neurological: She is alert and oriented to person, place, and time. No cranial nerve deficit.   Skin: Skin is warm and dry. No rash noted. She is not diaphoretic. No erythema. No pallor.   Psychiatric: She has a normal mood and affect. Her behavior is normal. Judgment and thought content normal.       Assessment:     1. Neuropathy (CMS-HCC)  FOLATES RBC (WITH HCT)    VITAMIN B12   2. Burning pain     3. Arthralgia, unspecified joint  FOLATES RBC (WITH HCT)    VITAMIN B12     Labs:      Lab Results   Component Value Date/Time    QNTTBGOLD Negative 10/20/2017 03:40 PM     Lab Results   Component Value Date/Time    HEPBCORTOT Negative 10/20/2017 03:40 PM    HEPBSAG Negative 10/20/2017 03:40 PM     Lab Results   Component Value Date/Time    HEPCAB Negative 10/20/2017 03:40 PM     Lab Results   Component Value Date/Time    SODIUM 140 10/20/2017 03:40 PM    POTASSIUM 3.6 10/20/2017 03:40 PM    CHLORIDE 105 10/20/2017 03:40 PM    CO2 24 10/20/2017 03:40 PM    GLUCOSE 100 (H) 10/20/2017 03:40 PM    BUN 21 10/20/2017 03:40 PM    CREATININE 0.64 10/20/2017 03:40 PM      Lab Results    Component Value Date/Time    WBC 10.6 10/20/2017 03:40 PM    RBC 4.90 10/20/2017 03:40 PM    HEMOGLOBIN 14.5 10/20/2017 03:40 PM    HEMATOCRIT 42.8 10/20/2017 03:40 PM    MCV 87.3 10/20/2017 03:40 PM    MCH 29.6 10/20/2017 03:40 PM    MCHC 33.9 10/20/2017 03:40 PM    MPV 9.4 10/20/2017 03:40 PM    NEUTSPOLYS 63.30 10/20/2017 03:40 PM    LYMPHOCYTES 26.50 10/20/2017 03:40 PM    MONOCYTES 6.70 10/20/2017 03:40 PM    EOSINOPHILS 2.70 10/20/2017 03:40 PM    BASOPHILS 0.50 10/20/2017 03:40 PM    HYPOCHROMIA 1+ 07/08/2014 09:03 AM      Lab Results   Component Value Date/Time    CALCIUM 9.1 10/20/2017 03:40 PM    ASTSGOT 12 10/20/2017 03:40 PM    ALTSGPT 15 10/20/2017 03:40 PM    ALKPHOSPHAT 49 10/20/2017 03:40 PM    TBILIRUBIN 0.2 10/20/2017 03:40 PM    ALBUMIN 3.8 10/20/2017 03:40 PM    TOTPROTEIN 6.9 10/20/2017 03:40 PM     Lab Results   Component Value Date/Time    URICACID 3.1 04/18/2017 03:37 PM    RHEUMFACTN <10 03/14/2017 08:14 AM    CCPANTIBODY 3 03/14/2017 08:14 AM    ANTINUCAB None Detected 04/18/2017 03:37 PM     Lab Results   Component Value Date/Time    SEDRATEWES 16 07/11/2017 11:59 AM    CREACTPROT 1.51 (H) 07/11/2017 11:59 AM     No results found for: RUSSELVIPER, DRVVTINTERP  Lab Results   Component Value Date/Time    T8AUNOGOGDU 184.0 04/18/2017 03:37 PM    I0UVMZSSWXV 48.0 04/18/2017 03:37 PM     Lab Results   Component Value Date/Time    ANTIDNADS None Detected 04/18/2017 03:37 PM    RNPAB 0 04/18/2017 03:37 PM    SMITHAB 0 04/18/2017 03:37 PM    POMYKRL24 0 04/18/2017 03:37 PM    CENTROMBAB 2 04/18/2017 03:37 PM     Lab Results   Component Value Date/Time    ANTIDNADS None Detected 04/18/2017 03:37 PM    ANCAIGG <1:20 04/18/2017 03:37 PM    T4SRRKYFZAP 184.0 04/18/2017 03:37 PM    RNPAB 0 04/18/2017 03:37 PM    ANTISSBSJ 1 04/18/2017 03:37 PM     Lab Results   Component Value Date/Time    COLORURINE Lt. Yellow 04/18/2017 03:37 PM    SPECGRAVITY 1.021 04/18/2017 03:37 PM    PHURINE 5.5 04/18/2017  03:37 PM    GLUCOSEUR Negative 04/18/2017 03:37 PM    KETONES Negative 04/18/2017 03:37 PM    PROTEINURIN Negative 04/18/2017 03:37 PM     No results found for: TOTPROTUR, TOTALVOLUME, [id number]  Lab Results   Component Value Date/Time    SSA60 14 04/18/2017 03:37 PM    SSA52 6 04/18/2017 03:37 PM     Lab Results   Component Value Date/Time    HBA1C 5.3 01/18/2013 10:59 AM     No results found for: CPKTOTAL  Lab Results   Component Value Date/Time    G6PD 12.6 04/18/2017 03:37 PM     Lab Results   Component Value Date/Time    ZGKC30CWQA Negative 10/20/2017 03:40 PM     No results found for: ACESERUM  Lab Results   Component Value Date/Time    25HYDROXY 26 (L) 05/08/2017 03:17 PM     No results found for: TSH, FREEDIR  Lab Results   Component Value Date/Time    TSHULTRASEN <0.005 (L) 12/28/2017 01:19 PM    FREET4 0.94 12/28/2017 01:19 PM     Lab Results   Component Value Date/Time    MICROSOMALA 11.1 (H) 07/11/2017 11:59 AM    ANTITHYROGL <0.9 01/03/2014 10:40 AM     No results found for: IGGLYMEABS  No results found for: ANTIMITOCHO, FACTIN  No results found for: IGA, TTRANSIGA, ENDOIGA  No results found for: FLTYPE, CRYSTALSBDF  No results found for: ISTATICAL  No results found for: ISTATCREAT  No results found for: CTELOPEP  No results found for: GBMABG  No results found for: PTHINTACT      Medical Decision Making:  Today's Assessment / Status / Plan:     Seronegative rheumatoid arthritis  She has not started methotrexate.  At this point we will monitor and when she is ready we will start methotrexate    I reviewed the side effects of methotrexate including but not limited to hepatotoxicity, nausea, hair loss, the importance of taking folic acid, and close monitoring of his labs.      Low back pain  She has features suggestive of an inflammatory pain  HLA B27  Negative 10/20/2017    Autoimmune urticaria  For now stable  Continue plaquenil    Tingling - peripheral neuropathy???  Will check B12  It was normal  5/8/2017  Folate is normal  Check folate RBC    1. Neuropathy (CMS-HCC)  FOLATES RBC (WITH HCT)    VITAMIN B12   2. Burning pain     3. Arthralgia, unspecified joint  FOLATES RBC (WITH HCT)    VITAMIN B12     Return in about 4 months (around 6/8/2018).    I have spent greater than 50% of this 30 minute visit in counseling/coordination of care with the patient regarding therapy plan and next steps.    She agreed and verbalized her agreement and understanding with the current plan. I answered all questions and concerns she has at this time and advised her to call at any time in there interim with questions or concerns in regards to her care.    Thank you for allowing me to participate in her care, I will continue to follow closely.

## 2018-02-08 ENCOUNTER — OFFICE VISIT (OUTPATIENT)
Dept: RHEUMATOLOGY | Facility: PHYSICIAN GROUP | Age: 53
End: 2018-02-08
Payer: COMMERCIAL

## 2018-02-08 VITALS
RESPIRATION RATE: 14 BRPM | DIASTOLIC BLOOD PRESSURE: 60 MMHG | TEMPERATURE: 98.6 F | SYSTOLIC BLOOD PRESSURE: 140 MMHG | WEIGHT: 293 LBS | HEART RATE: 92 BPM | BODY MASS INDEX: 49.9 KG/M2 | OXYGEN SATURATION: 93 %

## 2018-02-08 DIAGNOSIS — M25.50 ARTHRALGIA, UNSPECIFIED JOINT: ICD-10-CM

## 2018-02-08 DIAGNOSIS — R52 BURNING PAIN: ICD-10-CM

## 2018-02-08 DIAGNOSIS — G62.9 NEUROPATHY: ICD-10-CM

## 2018-02-08 PROCEDURE — 99214 OFFICE O/P EST MOD 30 MIN: CPT | Performed by: INTERNAL MEDICINE

## 2018-02-08 NOTE — LETTER
[facility]   [address], [address]  [place] [address]  Phone: [phone]  Fax: [phone]              Encounter Date: 2/8/2018    Dear Dr. King,      It was a pleasure seeing your patient, Gabriella Clifford, on 2/8/2018. Diagnoses of Neuropathy (CMS-HCC), Burning pain, and Arthralgia, unspecified joint were pertinent to this visit.     Please find attached progress note which includes the history I obtained from Ms. Clifford, my physical examination findings, my impression and recommendations.      Once again, it was a pleasure participating in your patient's care.  Please feel free to contact me if you have any questions or if I can be of any further assistance to your patients.      Sincerely,    Maylin Willingham M.D.  Electronically Signed          PROGRESS NOTE:  Subjective:   Date of Consultation:2/8/2018  1:13 PM  Primary care physician:Brenda King, A.P.RElinNElin      Reason for Consultation:  Ms. Clifford  is a pleasant 51 y.o. year old female who presented with follow-up of a polyarthralgia    24 and Me  The variant tested is a change from a C to a T in the DNA sequence of the HLA-DQA1 gene. 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  We planned to start methotrexate but had not started yet.  She overall is managing her pain and swelling.  She continues to have symptoms of neuropathy.    Urticaria (autoimmune)  Quiescent  No new lesions    Low back pain  Not addressed today    RHEUM HISTORY:  Ms. Gabriella Clifford first presented to me with a history of autoimmune urticaria and elevated CRP.  She had been previously managed with plaquenil for her autoimmune urticaria as well as histamine blockers.  More recently to her initial visit she reports worsening hand pain and bilateral knee pain.  She had managed with 800 mg ibuprofen.  She  also reported 1-2 hours of morning stiffness with swollen joint and stiff hands.  For her evaluation she had positive TPO antibody.  She was started on levothyroxin 6/2017.  In 4/2016 we started her on plaquenil 400 mg po q day.        Pertinent serologies  Rheumatoid factor is negative  Anti-CCP is negative    Results for AIRAM WATSON (MRN [id number]) as of 8/3/2017 13:30   Ref. Range 4/18/2017 15:37 7/11/2017 11:59   Anti-Dna -Ds Latest Ref Range: None Detected  None Detected    Microsomal -Tpo- Abs Latest Ref Range: 0.0-9.0 IU/mL  11.1 (H)   Thyrotropin Receptor Abs Latest Ref Range: <=1.75 IU/L  2.53 (H)   Anti-Scl-70 Latest Ref Range: 0-40 AU/mL 0    Anti-Centromere B Ab Latest Ref Range: 0-40 AU/mL 2    Antinuclear Antibody Latest Ref Range: None Detected  None Detected    Rnp Antibodies Latest Ref Range: 0-40 AU/mL 0    Smith Antibodies Latest Ref Range: 0-40 AU/mL 0    SSA 52 (R0)(SANJUANA) Ab, IgG Latest Ref Range: 0-40 AU/mL 6    SSA 60 (R0)(SANJUANA) Ab, IgG Latest Ref Range: 0-40 AU/mL 14    Sjogren'S Anti-Ss-B Latest Ref Range: 0-40 AU/mL 1    ANCA IgG Latest Ref Range: <1:20  <1:20      Evaluation showed an elevated CRP of 1.76 and a elevated ESR of 20    Hand x-ray from March 16, 2017 showed no evidence of any marginal erosions or periosteal reaction.    Past medical history: Graves, autoimmune urticaria, asthma, high blood pressure diagnosed in 1995.  No miscarriages.  She did have HTN/toxemia during her second pregnancy.  She denies any blood clot history.  Adult onset asthma. She had jaw surgery in middle school. In the 1990s she had tubal ligation. 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      Family history: Rheumatoid arthritis, mom and great aunt had arthritis. First cousin had thyroid Hashimoto's. Father had brain aneurysm.    Social history: Former smoker (social smoker once a week if any), occasional alcohol use. No IV drug  use.    Past Medical History:   Diagnosis Date   • Bursitis of shoulder, left    • Chickenpox    • Setswana measles    • Graves disease    • Hypertension    • Rheumatoid arthritis(714.0)    • Rotator cuff tear, left    • Seasonal allergies    • Urticaria, chronic 1/3/2013     Past Surgical History:   Procedure Laterality Date   • CARPAL TUNNEL RELEASE     • CARPAL TUNNEL RELEASE     • CUBITAL TUNNEL RELEASE     • OTHER      jaw surgery   • ROTATOR CUFF REPAIR      left   • SHOULDER SURGERY     • TUBAL COAGULATION LAPAROSCOPIC BILATERAL       Allergies   Allergen Reactions   • Erythromycin    • Keflex    • Pcn [Penicillins]      Outpatient Encounter Prescriptions as of 2/8/2018   Medication Sig Dispense Refill   • Probiotic Product (PROBIOTIC PO) Take 1 Tab by mouth every day.     • liothyronine (CYTOMEL) 25 MCG Tab Take 1 Tab by mouth every day. 30 Tab 3   • levothyroxine (SYNTHROID) 300 MCG Tab Take 1 Tab by mouth every day. 90 Tab 3   • losartan (COZAAR) 100 MG Tab TAKE 1 TABLET BY MOUTH EVERY DAY 90 Tab 1   • NIFEdipine (ADALAT CC) 60 MG CR tablet Take 1 Tab by mouth every day. 30 Tab 2   • Multiple Vitamins-Minerals (ONE-A-DAY 50 PLUS PO) Take  by mouth.     • tolterodine ER (DETROL-LA) 2 MG CAPSULE SR 24 HR Take 1 Cap by mouth every day. 30 Cap 11   • ibuprofen (MOTRIN) 800 MG Tab Take 1 Tab by mouth every 8 hours as needed for Mild Pain. 90 Tab 3   • [DISCONTINUED] sulfamethoxazole-trimethoprim (BACTRIM DS) 800-160 MG tablet 1 TAB TWICE X 7 DAYS. 14 Tab 0   • [DISCONTINUED] hydroxychloroquine (PLAQUENIL) 200 MG Tab TAKE 1 TABLET BY MOUTH TWICE DAILY 60 Tab 5   • Albuterol Sulfate (PROAIR HFA INH) Inhale  by mouth.       No facility-administered encounter medications on file as of 2/8/2018.        Social History     Social History   • Marital status:      Spouse name: N/A   • Number of children: N/A   • Years of education: N/A     Occupational History   • Not on file.     Social History Main Topics   •  Smoking status: Former Smoker     Packs/day: 0.25     Types: Cigarettes     Quit date: 6/22/2015   • Smokeless tobacco: Never Used   • Alcohol use 0.0 oz/week      Comment: Occassionally   • Drug use: No   • Sexual activity: Yes     Other Topics Concern   • Not on file     Social History Narrative   • No narrative on file      History   Smoking Status   • Former Smoker   • Packs/day: 0.25   • Types: Cigarettes   • Quit date: 6/22/2015   Smokeless Tobacco   • Never Used     History   Alcohol Use   • 0.0 oz/week     Comment: Occassionally     History   Drug Use No      OB History   No data available      Patient's last menstrual period was 09/11/2016.    G P A L     Family History   Problem Relation Age of Onset   • Arthritis Mother      RA   • Cancer Mother      breast   • Hypertension Father    • Cancer Maternal Grandmother        Review of Systems   Constitutional: Negative for chills and fever.   Cardiovascular: Negative for chest pain.   Musculoskeletal: Positive for joint pain.   Skin: Negative for rash.        Objective:   /60   Pulse 92   Temp 37 °C (98.6 °F)   Resp 14   Wt (!) 140.2 kg (309 lb)   LMP 09/11/2016   SpO2 93%   BMI 49.90 kg/m²      Physical Exam   Constitutional: She is oriented to person, place, and time. She appears well-developed and well-nourished. No distress.   HENT:   Head: Normocephalic and atraumatic.   Right Ear: External ear normal.   Left Ear: External ear normal.   Eyes: Conjunctivae are normal. Right eye exhibits no discharge. Left eye exhibits no discharge.   Pulmonary/Chest: No stridor. No respiratory distress.   Musculoskeletal: She exhibits no edema.   No active synovitis of the upper extremities   Neurological: She is alert and oriented to person, place, and time. No cranial nerve deficit.   Skin: Skin is warm and dry. No rash noted. She is not diaphoretic. No erythema. No pallor.   Psychiatric: She has a normal mood and affect. Her behavior is normal. Judgment and  thought content normal.       Assessment:     1. Neuropathy (CMS-HCC)  FOLATES RBC (WITH HCT)    VITAMIN B12   2. Burning pain     3. Arthralgia, unspecified joint  FOLATES RBC (WITH HCT)    VITAMIN B12     Labs:      Lab Results   Component Value Date/Time    QNTTBGOLD Negative 10/20/2017 03:40 PM     Lab Results   Component Value Date/Time    HEPBCORTOT Negative 10/20/2017 03:40 PM    HEPBSAG Negative 10/20/2017 03:40 PM     Lab Results   Component Value Date/Time    HEPCAB Negative 10/20/2017 03:40 PM     Lab Results   Component Value Date/Time    SODIUM 140 10/20/2017 03:40 PM    POTASSIUM 3.6 10/20/2017 03:40 PM    CHLORIDE 105 10/20/2017 03:40 PM    CO2 24 10/20/2017 03:40 PM    GLUCOSE 100 (H) 10/20/2017 03:40 PM    BUN 21 10/20/2017 03:40 PM    CREATININE 0.64 10/20/2017 03:40 PM      Lab Results   Component Value Date/Time    WBC 10.6 10/20/2017 03:40 PM    RBC 4.90 10/20/2017 03:40 PM    HEMOGLOBIN 14.5 10/20/2017 03:40 PM    HEMATOCRIT 42.8 10/20/2017 03:40 PM    MCV 87.3 10/20/2017 03:40 PM    MCH 29.6 10/20/2017 03:40 PM    MCHC 33.9 10/20/2017 03:40 PM    MPV 9.4 10/20/2017 03:40 PM    NEUTSPOLYS 63.30 10/20/2017 03:40 PM    LYMPHOCYTES 26.50 10/20/2017 03:40 PM    MONOCYTES 6.70 10/20/2017 03:40 PM    EOSINOPHILS 2.70 10/20/2017 03:40 PM    BASOPHILS 0.50 10/20/2017 03:40 PM    HYPOCHROMIA 1+ 07/08/2014 09:03 AM      Lab Results   Component Value Date/Time    CALCIUM 9.1 10/20/2017 03:40 PM    ASTSGOT 12 10/20/2017 03:40 PM    ALTSGPT 15 10/20/2017 03:40 PM    ALKPHOSPHAT 49 10/20/2017 03:40 PM    TBILIRUBIN 0.2 10/20/2017 03:40 PM    ALBUMIN 3.8 10/20/2017 03:40 PM    TOTPROTEIN 6.9 10/20/2017 03:40 PM     Lab Results   Component Value Date/Time    URICACID 3.1 04/18/2017 03:37 PM    RHEUMFACTN <10 03/14/2017 08:14 AM    CCPANTIBODY 3 03/14/2017 08:14 AM    ANTINUCAB None Detected 04/18/2017 03:37 PM     Lab Results   Component Value Date/Time    SEDRATEWES 16 07/11/2017 11:59 AM    CREACTPROT 1.51  (H) 07/11/2017 11:59 AM     No results found for: RUSSELVIPER, DRVVTINTERP  Lab Results   Component Value Date/Time    K2VGUZJSVSL 184.0 04/18/2017 03:37 PM    W4WYVNGMVNL 48.0 04/18/2017 03:37 PM     Lab Results   Component Value Date/Time    ANTIDNADS None Detected 04/18/2017 03:37 PM    RNPAB 0 04/18/2017 03:37 PM    SMITHAB 0 04/18/2017 03:37 PM    EODPTKD27 0 04/18/2017 03:37 PM    CENTROMBAB 2 04/18/2017 03:37 PM     Lab Results   Component Value Date/Time    ANTIDNADS None Detected 04/18/2017 03:37 PM    ANCAIGG <1:20 04/18/2017 03:37 PM    I2MIQEEITZS 184.0 04/18/2017 03:37 PM    RNPAB 0 04/18/2017 03:37 PM    ANTISSBSJ 1 04/18/2017 03:37 PM     Lab Results   Component Value Date/Time    COLORURINE Lt. Yellow 04/18/2017 03:37 PM    SPECGRAVITY 1.021 04/18/2017 03:37 PM    PHURINE 5.5 04/18/2017 03:37 PM    GLUCOSEUR Negative 04/18/2017 03:37 PM    KETONES Negative 04/18/2017 03:37 PM    PROTEINURIN Negative 04/18/2017 03:37 PM     No results found for: TOTPROTUR, TOTALVOLUME, [id number]  Lab Results   Component Value Date/Time    SSA60 14 04/18/2017 03:37 PM    SSA52 6 04/18/2017 03:37 PM     Lab Results   Component Value Date/Time    HBA1C 5.3 01/18/2013 10:59 AM     No results found for: CPKTOTAL  Lab Results   Component Value Date/Time    G6PD 12.6 04/18/2017 03:37 PM     Lab Results   Component Value Date/Time    WKBI86CKOT Negative 10/20/2017 03:40 PM     No results found for: ACESERUM  Lab Results   Component Value Date/Time    25HYDROXY 26 (L) 05/08/2017 03:17 PM     No results found for: TSH, FREEDIR  Lab Results   Component Value Date/Time    TSHULTRASEN <0.005 (L) 12/28/2017 01:19 PM    FREET4 0.94 12/28/2017 01:19 PM     Lab Results   Component Value Date/Time    MICROSOMALA 11.1 (H) 07/11/2017 11:59 AM    ANTITHYROGL <0.9 01/03/2014 10:40 AM     No results found for: IGGLYMEABS  No results found for: ANTIMITOCHO, FACTIN  No results found for: IGA, TTRANSIGA, ENDOIGA  No results found for: FLTYPE,  CRYSTALSBDF  No results found for: ISTATICAL  No results found for: ISTATCREAT  No results found for: CTELOPEP  No results found for: GBMABG  No results found for: PTHINTACT      Medical Decision Making:  Today's Assessment / Status / Plan:     Seronegative rheumatoid arthritis  She has not started methotrexate.  At this point we will monitor and when she is ready we will start methotrexate    I reviewed the side effects of methotrexate including but not limited to hepatotoxicity, nausea, hair loss, the importance of taking folic acid, and close monitoring of his labs.      Low back pain  She has features suggestive of an inflammatory pain  HLA B27  Negative 10/20/2017    Autoimmune urticaria  For now stable  Continue plaquenil    Tingling - peripheral neuropathy???  Will check B12  It was normal 5/8/2017  Folate is normal  Check folate RBC    1. Neuropathy (CMS-HCC)  FOLATES RBC (WITH HCT)    VITAMIN B12   2. Burning pain     3. Arthralgia, unspecified joint  FOLATES RBC (WITH HCT)    VITAMIN B12     Return in about 4 months (around 6/8/2018).    I have spent greater than 50% of this 30 minute visit in counseling/coordination of care with the patient regarding therapy plan and next steps.    She agreed and verbalized her agreement and understanding with the current plan. I answered all questions and concerns she has at this time and advised her to call at any time in there interim with questions or concerns in regards to her care.    Thank you for allowing me to participate in her care, I will continue to follow closely.

## 2018-03-01 ENCOUNTER — HOSPITAL ENCOUNTER (OUTPATIENT)
Dept: LAB | Facility: MEDICAL CENTER | Age: 53
End: 2018-03-01
Attending: INTERNAL MEDICINE
Payer: COMMERCIAL

## 2018-03-01 DIAGNOSIS — M25.50 ARTHRALGIA, UNSPECIFIED JOINT: ICD-10-CM

## 2018-03-01 DIAGNOSIS — E55.9 VITAMIN D DEFICIENCY: ICD-10-CM

## 2018-03-01 DIAGNOSIS — E03.9 HYPOTHYROIDISM, UNSPECIFIED TYPE: ICD-10-CM

## 2018-03-01 DIAGNOSIS — G62.9 NEUROPATHY: ICD-10-CM

## 2018-03-01 LAB
25(OH)D3 SERPL-MCNC: 22 NG/ML (ref 30–100)
HCT VFR BLD AUTO: 43.2 % (ref 37–47)
T3 SERPL-MCNC: 95 NG/DL (ref 60–181)
T4 FREE SERPL-MCNC: 0.79 NG/DL (ref 0.53–1.43)
TSH SERPL DL<=0.005 MIU/L-ACNC: <0.005 UIU/ML (ref 0.38–5.33)
VIT B12 SERPL-MCNC: 411 PG/ML (ref 211–911)

## 2018-03-01 PROCEDURE — 36415 COLL VENOUS BLD VENIPUNCTURE: CPT

## 2018-03-01 PROCEDURE — 82747 ASSAY OF FOLIC ACID RBC: CPT

## 2018-03-01 PROCEDURE — 84480 ASSAY TRIIODOTHYRONINE (T3): CPT

## 2018-03-01 PROCEDURE — 84439 ASSAY OF FREE THYROXINE: CPT

## 2018-03-01 PROCEDURE — 84443 ASSAY THYROID STIM HORMONE: CPT

## 2018-03-01 PROCEDURE — 82306 VITAMIN D 25 HYDROXY: CPT

## 2018-03-01 PROCEDURE — 82607 VITAMIN B-12: CPT

## 2018-03-01 PROCEDURE — 85014 HEMATOCRIT: CPT

## 2018-03-03 LAB — FOLATE RBC-MCNC: 871 NG/ML

## 2018-03-07 ENCOUNTER — OFFICE VISIT (OUTPATIENT)
Dept: ENDOCRINOLOGY | Facility: MEDICAL CENTER | Age: 53
End: 2018-03-07
Payer: COMMERCIAL

## 2018-03-07 VITALS
HEART RATE: 97 BPM | BODY MASS INDEX: 47.09 KG/M2 | DIASTOLIC BLOOD PRESSURE: 64 MMHG | SYSTOLIC BLOOD PRESSURE: 116 MMHG | OXYGEN SATURATION: 95 % | WEIGHT: 293 LBS | HEIGHT: 66 IN

## 2018-03-07 DIAGNOSIS — E03.9 HYPOTHYROIDISM, UNSPECIFIED TYPE: ICD-10-CM

## 2018-03-07 DIAGNOSIS — E66.01 MORBID OBESITY WITH BMI OF 45.0-49.9, ADULT (HCC): ICD-10-CM

## 2018-03-07 DIAGNOSIS — E55.9 VITAMIN D DEFICIENCY: ICD-10-CM

## 2018-03-07 PROCEDURE — 99214 OFFICE O/P EST MOD 30 MIN: CPT | Performed by: INTERNAL MEDICINE

## 2018-03-08 NOTE — PROGRESS NOTES
Endocrinology Clinic Progress Note  PCP: VALERIE Lopez    HPI:  Gabriella Clifford is a 52 y.o. old patient who comes in today for review of endocrine problems.  Hypothyroidism: On replacement with levothyroxine. Overall she feels fine.  Morbid obesity: She checked with her insurance and she has no coverage for weight loss medications like saxenda and contrave.     ROS:  Constitutional: No unintentional weight loss  Endo: Denies excessive thirst or frequent urination  All other systems were reviewed and were negative.    Past Medical History:  Patient Active Problem List    Diagnosis Date Noted   • DENISE (obstructive sleep apnea)-presumed 06/22/2017   • Acquired hypothyroidism 06/22/2017   • Health care maintenance 04/24/2017   • Morbid obesity with BMI of 45.0-49.9, adult ([facility]) 03/14/2017   • Chronic cough 12/22/2015   • Urinary urgency 09/22/2015   • Burning pain 09/22/2015   • Graves disease 02/12/2015   • Subclinical hyperthyroidism 07/31/2014   • Multiple thyroid nodules 02/02/2014   • Joint pain 08/27/2013   • Urticaria, chronic 01/03/2013   • Hypertension 01/03/2013   • Asthma in adult 01/03/2013   • Shoulder pain 01/03/2013       Medications:    Current Outpatient Prescriptions:   •  Probiotic Product (PROBIOTIC PO), Take 1 Tab by mouth every day., Disp: , Rfl:   •  liothyronine (CYTOMEL) 25 MCG Tab, Take 1 Tab by mouth every day., Disp: 30 Tab, Rfl: 3  •  levothyroxine (SYNTHROID) 300 MCG Tab, Take 1 Tab by mouth every day., Disp: 90 Tab, Rfl: 3  •  losartan (COZAAR) 100 MG Tab, TAKE 1 TABLET BY MOUTH EVERY DAY, Disp: 90 Tab, Rfl: 1  •  NIFEdipine (ADALAT CC) 60 MG CR tablet, Take 1 Tab by mouth every day., Disp: 30 Tab, Rfl: 2  •  Multiple Vitamins-Minerals (ONE-A-DAY 50 PLUS PO), Take  by mouth., Disp: , Rfl:   •  tolterodine ER (DETROL-LA) 2 MG CAPSULE SR 24 HR, Take 1 Cap by mouth every day., Disp: 30 Cap, Rfl: 11  •  ibuprofen (MOTRIN) 800 MG Tab, Take 1 Tab by mouth every 8 hours as  "needed for Mild Pain., Disp: 90 Tab, Rfl: 3  •  Albuterol Sulfate (PROAIR HFA INH), Inhale  by mouth., Disp: , Rfl:     Labs: Reviewed    Physical Examination:  Vital signs: /64   Pulse 97   Ht 1.676 m (5' 5.98\")   Wt (!) 140.6 kg (310 lb)   LMP 09/11/2016   SpO2 95%   BMI 50.06 kg/m²  Body mass index is 50.06 kg/m².  General: No apparent distress, cooperative  Eyes: No scleral icterus, no discharge, normal eyelids  Neck: No abnormal masses on inspection, normal thyroid exam  Resp: Normal effort, clear to auscultation bilaterally  CVS: Regular rate and rhythm, S1 S2 normal, no murmur  Extremities: No lower extremity edema  Abdomen: abdominal obesity present  Musculoskeletal: Normal digits and nails  Skin: No rash on visible skin  Psych: Alert and oriented, normal mood and affect, intact memory and able to make informed decisions.    Assessment and Plan:    1. Morbid obesity with BMI of 45.0-49.9, adult (HCC)    - REFERRAL TO [facility] (HIP) Services Requested: Physician Medical Weight Management Program, Registered Dietitian for Medical Nutrition Therapy, Registered Dietitian Medicare Obesity Counseling, General-HIP Staff to Evalua...    I also discussed with the patient on the use of \"My fitness pal\" program for weight loss and how to use it. Once she gets good on candidate tracking for at least 2-3 months then I will consider adding phentermine as an adjunct to diet and exercise measures.  Have also referred her to obesity physician to see if she is a good candidate for meal replacement.    2. Hypothyroidism, unspecified type  Continue levothyroxine replacement.    3. Vitamin D deficiency  Her vitamin D levels are very low. She could not tolerate the loading dose tablet of 50,000 units of vitamin D. She also could not tolerate 10,000 units daily of vitamin D. She is currently on about 2000 units daily and she can continue that.    Return in about 2 months (around " 5/7/2018).    Thank you for allowing me to participate in the care of this patient.    Afshin Guardado M.D.    CC:   Brenda King, [email or web address].    This note was created using voice recognition software (Dragon). The accuracy of the dictation is limited by the abilities of the software. I have reviewed the note prior to signing, however some errors in grammar and context are still possible. If you have any questions related to this note please do not hesitate to contact our office.

## 2018-03-19 DIAGNOSIS — E03.9 HYPOTHYROIDISM, UNSPECIFIED TYPE: ICD-10-CM

## 2018-03-19 RX ORDER — LIOTHYRONINE SODIUM 25 UG/1
25 TABLET ORAL DAILY
Qty: 30 TAB | Refills: 3 | Status: SHIPPED | OUTPATIENT
Start: 2018-03-19

## 2018-05-03 ENCOUNTER — OFFICE VISIT (OUTPATIENT)
Dept: MEDICAL GROUP | Facility: PHYSICIAN GROUP | Age: 53
End: 2018-05-03
Payer: COMMERCIAL

## 2018-05-03 VITALS
OXYGEN SATURATION: 98 % | TEMPERATURE: 98.4 F | SYSTOLIC BLOOD PRESSURE: 126 MMHG | DIASTOLIC BLOOD PRESSURE: 78 MMHG | HEART RATE: 80 BPM | BODY MASS INDEX: 47.09 KG/M2 | RESPIRATION RATE: 20 BRPM | HEIGHT: 66 IN | WEIGHT: 293 LBS

## 2018-05-03 DIAGNOSIS — R52 BURNING PAIN: ICD-10-CM

## 2018-05-03 DIAGNOSIS — L30.9 DERMATITIS: ICD-10-CM

## 2018-05-03 DIAGNOSIS — G47.01 INSOMNIA DUE TO MEDICAL CONDITION: ICD-10-CM

## 2018-05-03 DIAGNOSIS — J45.909 ASTHMA IN ADULT WITHOUT COMPLICATION, UNSPECIFIED ASTHMA SEVERITY, UNSPECIFIED WHETHER PERSISTENT: ICD-10-CM

## 2018-05-03 DIAGNOSIS — L98.9 SKIN LESION: ICD-10-CM

## 2018-05-03 PROCEDURE — 99214 OFFICE O/P EST MOD 30 MIN: CPT | Performed by: NURSE PRACTITIONER

## 2018-05-03 RX ORDER — ESZOPICLONE 2 MG/1
2 TABLET, FILM COATED ORAL
Qty: 30 TAB | Refills: 2 | Status: SHIPPED | OUTPATIENT
Start: 2018-05-03 | End: 2018-08-01

## 2018-05-03 RX ORDER — TRIAMCINOLONE ACETONIDE 0.25 MG/G
CREAM TOPICAL
Qty: 15 G | Refills: 0 | Status: SHIPPED | OUTPATIENT
Start: 2018-05-03

## 2018-05-03 ASSESSMENT — PATIENT HEALTH QUESTIONNAIRE - PHQ9: CLINICAL INTERPRETATION OF PHQ2 SCORE: 0

## 2018-05-03 NOTE — ASSESSMENT & PLAN NOTE
Pt has had dry patch of skin just above the L corner of her mouth. It flakes off and comes back. It does not bleed or itch. It has not changed since it appeared. She has not been diagnosed with any type skin cancer, father had several spot burned off but was not told lesions were cancerous.

## 2018-05-03 NOTE — ASSESSMENT & PLAN NOTE
Pt continues to struggle with burning in her hands, to the palmar side of her fingertips. Cold water seems to improve the pain, but cannot think of anything that worsens the symptoms. She is seen by rheumatology who suggested she have a nerve conduction study--she adamantly declines having this study done, as she has had it done in the past and she finds it to be extremely painful. She has been told that this is possibly Raynaud's syndrome. She has been tried on different medications none that seemed to work. We'll bothers her the most is how it keeps her up at night, she is willing to try medication but will just help her sleep. We will have her try Lunesta 2 mg at bedtime.

## 2018-05-04 NOTE — ASSESSMENT & PLAN NOTE
Patient has long history of asthma and is usually well controlled when she takes her medications consistently. She has not been taking them as of late and has noticed that her breathing has been a little bit more labored. She was recently in [place] where the altitude is bit lower and she noticed that she was breathing better. When she returned, she felt more winded. I did advise her to use her medications as prescribed and see if this helps improve her symptoms. She does deny any other symptoms of upper respiratory infection.

## 2018-05-04 NOTE — PROGRESS NOTES
"Chief Complaint   Patient presents with   • Eczema     dry, rough patch on face, \"burning hands\"   • Asthma     discuss breathing, fatigue         This is a 52 y.o.female patient that presents today with the following: Follow-up visit    Burning pain  Pt continues to struggle with burning in her hands, to the palmar side of her fingertips. Cold water seems to improve the pain, but cannot think of anything that worsens the symptoms. She is seen by rheumatology who suggested she have a nerve conduction study--she adamantly declines having this study done, as she has had it done in the past and she finds it to be extremely painful. She has been told that this is possibly Raynaud's syndrome. She has been tried on different medications none that seemed to work. We'll bothers her the most is how it keeps her up at night, she is willing to try medication but will just help her sleep. We will have her try Lunesta 2 mg at bedtime.    Skin lesion  Pt has had dry patch of skin just above the L corner of her mouth. It flakes off and comes back. It does not bleed or itch. It has not changed since it appeared. She has not been diagnosed with any type skin cancer, father had several spot burned off but was not told lesions were cancerous.     Asthma in adult  Patient has long history of asthma and is usually well controlled when she takes her medications consistently. She has not been taking them as of late and has noticed that her breathing has been a little bit more labored. She was recently in [place] where the altitude is bit lower and she noticed that she was breathing better. When she returned, she felt more winded. I did advise her to use her medications as prescribed and see if this helps improve her symptoms. She does deny any other symptoms of upper respiratory infection.      No visits with results within 1 Month(s) from this visit.   Latest known visit with results is:   Hospital Outpatient Visit on 03/01/2018 "   Component Date Value   • Red Blood Cell Folate 03/01/2018 871    • Vitamin B12 -True Cobala* 03/01/2018 411    • Hematocrit 03/01/2018 43.2          clinical course has been stable    Past Medical History:   Diagnosis Date   • Bursitis of shoulder, left    • Chickenpox    • Egyptian measles    • Graves disease    • Hypertension    • Rheumatoid arthritis(714.0)    • Rotator cuff tear, left    • Seasonal allergies    • Urticaria, chronic 1/3/2013       Past Surgical History:   Procedure Laterality Date   • CARPAL TUNNEL RELEASE     • CARPAL TUNNEL RELEASE     • CUBITAL TUNNEL RELEASE     • OTHER      jaw surgery   • ROTATOR CUFF REPAIR      left   • SHOULDER SURGERY     • TUBAL COAGULATION LAPAROSCOPIC BILATERAL         Family History   Problem Relation Age of Onset   • Arthritis Mother      RA   • Cancer Mother      breast   • Hypertension Father    • Cancer Maternal Grandmother        Erythromycin; Keflex; and Pcn [penicillins]    Current Outpatient Prescriptions Ordered in [facility]   Medication Sig Dispense Refill   • Eszopiclone 2 MG Tab Take 1 Tab by mouth every bedtime for 90 days. 30 Tab 2   • triamcinolone acetonide (KENALOG) 0.025 % Cream Apply to affected area twice daily for 2 weeks 15 g 0   • liothyronine (CYTOMEL) 25 MCG Tab Take 1 Tab by mouth every day. 30 Tab 3   • Probiotic Product (PROBIOTIC PO) Take 1 Tab by mouth every day.     • levothyroxine (SYNTHROID) 300 MCG Tab Take 1 Tab by mouth every day. 90 Tab 3   • losartan (COZAAR) 100 MG Tab TAKE 1 TABLET BY MOUTH EVERY DAY 90 Tab 1   • NIFEdipine (ADALAT CC) 60 MG CR tablet Take 1 Tab by mouth every day. 30 Tab 2   • Multiple Vitamins-Minerals (ONE-A-DAY 50 PLUS PO) Take  by mouth.     • tolterodine ER (DETROL-LA) 2 MG CAPSULE SR 24 HR Take 1 Cap by mouth every day. 30 Cap 11   • ibuprofen (MOTRIN) 800 MG Tab Take 1 Tab by mouth every 8 hours as needed for Mild Pain. 90 Tab 3   • Albuterol Sulfate (PROAIR HFA INH) Inhale  by mouth.       No current  "Epic-ordered facility-administered medications on file.        Constitutional ROS: No unexpected change in weight, No weakness, No unexplained fevers, sweats, or chills. Positive for insomnia  Pulmonary ROS: Positive for asthma, for history of present illness  Cardiovascular ROS: No chest pain, No edema, No palpitations  Gastrointestinal ROS: No abdominal pain, No nausea, vomiting, diarrhea, or constipation  Musculoskeletal/Extremities ROS: No clubbing, No peripheral edema, No pain, redness or swelling on the joints  Skin/Integumentary ROS: Positive per history of present illness  Neurologic ROS: Normal development, No seizures, No weakness    Physical exam:  /78   Pulse 80   Temp 36.9 °C (98.4 °F)   Resp 20   Ht 1.676 m (5' 6\")   Wt (!) 142 kg (313 lb)   LMP 09/11/2016   SpO2 98%   BMI 50.52 kg/m²   General Appearance: Middle-aged female, alert, no distress, morbidly obese, well-groomed  Skin: Positive for small patch of erythematous, round maculopapular round rash just above left side of mouth  Lungs: negative findings: normal respiratory rate and rhythm, lungs clear to auscultation  Heart: negative. RRR without murmur, gallop, or rubs.  No ectopy.  Abdomen: Abdomen soft, non-tender. BS normal. No masses,  No organomegaly  Musculoskeletal: negative findings: no evidence of joint instability, strength normal, no deformities present  Neurologic: intact, oriented, mood appropriate, judgment intact. Cranial nerves II through XII grossly intact    Medical decision making/discussion: We'll have patient try Lunesta 2 mg at bedtime for sleep. She was encouraged to use her inhalers for asthma. She was encouraged to talk to rheumatology regarding the burning sensation in her hands. We'll have her try triamcinolone cream to dry patch on her face. I would like her to do this for 2 weeks, if there is no improvement she is to notify me and I will refer to dermatology for further evaluation.    Gabriella was seen " today for eczema and asthma.    Diagnoses and all orders for this visit:    Burning pain    Skin lesion  -     triamcinolone acetonide (KENALOG) 0.025 % Cream; Apply to affected area twice daily for 2 weeks    Insomnia due to medical condition  -     Eszopiclone 2 MG Tab; Take 1 Tab by mouth every bedtime for 90 days.    Dermatitis  -     triamcinolone acetonide (KENALOG) 0.025 % Cream; Apply to affected area twice daily for 2 weeks    Asthma in adult without complication, unspecified asthma severity, unspecified whether persistent          Please note that this dictation was created using voice recognition software. I have made every reasonable attempt to correct obvious errors, but I expect that there are errors of grammar and possibly content that I did not discover before finalizing the note.

## 2018-05-09 ENCOUNTER — OFFICE VISIT (OUTPATIENT)
Dept: ENDOCRINOLOGY | Facility: MEDICAL CENTER | Age: 53
End: 2018-05-09
Payer: COMMERCIAL

## 2018-05-09 VITALS
OXYGEN SATURATION: 92 % | HEART RATE: 97 BPM | DIASTOLIC BLOOD PRESSURE: 68 MMHG | WEIGHT: 293 LBS | SYSTOLIC BLOOD PRESSURE: 128 MMHG | HEIGHT: 66 IN | BODY MASS INDEX: 47.09 KG/M2

## 2018-05-09 DIAGNOSIS — E66.01 MORBID OBESITY WITH BMI OF 45.0-49.9, ADULT (HCC): ICD-10-CM

## 2018-05-09 DIAGNOSIS — E03.9 HYPOTHYROIDISM, UNSPECIFIED TYPE: ICD-10-CM

## 2018-05-09 DIAGNOSIS — E55.9 VITAMIN D DEFICIENCY: ICD-10-CM

## 2018-05-09 PROCEDURE — 99214 OFFICE O/P EST MOD 30 MIN: CPT | Performed by: INTERNAL MEDICINE

## 2018-05-09 NOTE — PROGRESS NOTES
Endocrinology Clinic Progress Note  PCP: VALERIE Lopez    HPI:  Gabriella Clifford is a 52 y.o. old patient who comes in today for review of endocrine problems.  Vitamin D deficiency: She is not able to tolerate any kind of vitamin D preparation. Gives her diarrhea and other GI side effects  Hypothyroidism on replacement with levothyroxine and liothyronine.    She did not take make an appointment to see the obesity physician as she may be moving back to [place]. She feels fatigued and tired at all times.    ROS:  Constitutional: No unintentional weight loss  Endo: Denies excessive thirst or frequent urination  All other systems were reviewed and were negative.    Past Medical History:  Patient Active Problem List    Diagnosis Date Noted   • Skin lesion 05/03/2018   • Insomnia due to medical condition 05/03/2018   • Dermatitis 05/03/2018   • DENISE (obstructive sleep apnea)-presumed 06/22/2017   • Acquired hypothyroidism 06/22/2017   • Health care maintenance 04/24/2017   • Morbid obesity with BMI of 45.0-49.9, adult (formerly [facility]) 03/14/2017   • Chronic cough 12/22/2015   • Urinary urgency 09/22/2015   • Burning pain 09/22/2015   • Graves disease 02/12/2015   • Subclinical hyperthyroidism 07/31/2014   • Multiple thyroid nodules 02/02/2014   • Joint pain 08/27/2013   • Urticaria, chronic 01/03/2013   • Hypertension 01/03/2013   • Asthma in adult 01/03/2013   • Shoulder pain 01/03/2013       Medications:    Current Outpatient Prescriptions:   •  Eszopiclone 2 MG Tab, Take 1 Tab by mouth every bedtime for 90 days., Disp: 30 Tab, Rfl: 2  •  triamcinolone acetonide (KENALOG) 0.025 % Cream, Apply to affected area twice daily for 2 weeks, Disp: 15 g, Rfl: 0  •  liothyronine (CYTOMEL) 25 MCG Tab, Take 1 Tab by mouth every day., Disp: 30 Tab, Rfl: 3  •  Probiotic Product (PROBIOTIC PO), Take 1 Tab by mouth every day., Disp: , Rfl:   •  levothyroxine (SYNTHROID) 300 MCG Tab, Take 1 Tab by mouth every day., Disp: 90  "Tab, Rfl: 3  •  losartan (COZAAR) 100 MG Tab, TAKE 1 TABLET BY MOUTH EVERY DAY, Disp: 90 Tab, Rfl: 1  •  NIFEdipine (ADALAT CC) 60 MG CR tablet, Take 1 Tab by mouth every day., Disp: 30 Tab, Rfl: 2  •  Multiple Vitamins-Minerals (ONE-A-DAY 50 PLUS PO), Take  by mouth., Disp: , Rfl:   •  tolterodine ER (DETROL-LA) 2 MG CAPSULE SR 24 HR, Take 1 Cap by mouth every day., Disp: 30 Cap, Rfl: 11  •  ibuprofen (MOTRIN) 800 MG Tab, Take 1 Tab by mouth every 8 hours as needed for Mild Pain., Disp: 90 Tab, Rfl: 3  •  Albuterol Sulfate (PROAIR HFA INH), Inhale  by mouth., Disp: , Rfl:     Labs: Reviewed    Physical Examination:  Vital signs: /68   Pulse 97   Ht 1.676 m (5' 5.98\")   Wt (!) 141.5 kg (312 lb)   LMP 09/11/2016   SpO2 92%   BMI 50.38 kg/m²  Body mass index is 50.38 kg/m².  General: No apparent distress, cooperative  Eyes: No scleral icterus, no discharge, normal eyelids  Neck: No abnormal masses on inspection, normal thyroid exam  Resp: Normal effort, clear to auscultation bilaterally  CVS: Regular rate and rhythm, S1 S2 normal, no murmur  Extremities: No lower extremity edema  Abdomen: abdominal obesity present  Musculoskeletal: Normal digits and nails  Skin: No rash on visible skin  Psych: Alert and oriented, normal mood and affect, intact memory and able to make informed decisions.    Assessment and Plan:    1. Hypothyroidism, unspecified type  Can take levothyroxine on all 7 days of the week and liothyronine on 5 days of the week.     2. Morbid obesity with BMI of 45.0-49.9, adult (HCC)  Making an appointment to see obesity physician if she decides to stay back in the area    3. Vitamin D deficiency  Try different preparations of vit d to see if she can tolerate it.     Return in about 3 months (around 8/9/2018).    Thank you for allowing me to participate in the care of this patient.    Afshin Guardado M.D.    CC:   [email or web address]    This note was created using voice recognition " software (Dragon). The accuracy of the dictation is limited by the abilities of the software. I have reviewed the note prior to signing, however some errors in grammar and context are still possible. If you have any questions related to this note please do not hesitate to contact our office.

## 2018-05-29 ENCOUNTER — OFFICE VISIT (OUTPATIENT)
Dept: RHEUMATOLOGY | Facility: PHYSICIAN GROUP | Age: 53
End: 2018-05-29
Payer: COMMERCIAL

## 2018-05-29 VITALS
SYSTOLIC BLOOD PRESSURE: 126 MMHG | RESPIRATION RATE: 16 BRPM | TEMPERATURE: 99.7 F | DIASTOLIC BLOOD PRESSURE: 82 MMHG | WEIGHT: 293 LBS | HEART RATE: 103 BPM | BODY MASS INDEX: 50.19 KG/M2 | OXYGEN SATURATION: 95 %

## 2018-05-29 DIAGNOSIS — G62.9 NEUROPATHY: ICD-10-CM

## 2018-05-29 DIAGNOSIS — E05.90 SUBCLINICAL HYPERTHYROIDISM: ICD-10-CM

## 2018-05-29 DIAGNOSIS — J45.909 UNCOMPLICATED ASTHMA, UNSPECIFIED ASTHMA SEVERITY, UNSPECIFIED WHETHER PERSISTENT: ICD-10-CM

## 2018-05-29 DIAGNOSIS — M06.00 SERONEGATIVE RHEUMATOID ARTHRITIS (HCC): ICD-10-CM

## 2018-05-29 PROCEDURE — 99213 OFFICE O/P EST LOW 20 MIN: CPT | Performed by: INTERNAL MEDICINE

## 2018-05-29 ASSESSMENT — ENCOUNTER SYMPTOMS
FEVER: 0
CHILLS: 0

## 2018-05-29 NOTE — PROGRESS NOTES
Subjective:   Date of Consultation:5/29/2018  2:09 PM  Primary care physician:VALERIE Lopez      Reason for Consultation:  Ms. Clifford  is a pleasant 51 y.o. year old female who presented with follow-up of a polyarthralgia    24 and Me  The variant tested is a change from a C to a T in the DNA sequence of the HLA-DQA1 gene. 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  She overall is managing her pain and swelling.  She continues to have symptoms of neuropathy.    Urticaria (autoimmune)  Quiescent  No new lesions    Low back pain  Sometimes has lower back pain    Asthma  Went to visit daughter in [facility] and was doing well then flared when she returned to [place].    RHEUM HISTORY:  Ms. Airam Clifford first presented to me with a history of autoimmune urticaria and elevated CRP.  She had been previously managed with plaquenil for her autoimmune urticaria as well as histamine blockers.  More recently to her initial visit she reports worsening hand pain and bilateral knee pain.  She had managed with 800 mg ibuprofen.  She also reported 1-2 hours of morning stiffness with swollen joint and stiff hands.  For her evaluation she had positive TPO antibody.  She was started on levothyroxine 6/2017.  In 4/2016 we started her on plaquenil 400 mg po q day.        Pertinent serologies  Rheumatoid factor is negative  Anti-CCP is negative    Results for AIRAM CLIFFORD (MRN [id number]) as of 8/3/2017 13:30   Ref. Range 4/18/2017 15:37 7/11/2017 11:59   Anti-Dna -Ds Latest Ref Range: None Detected  None Detected    Microsomal -Tpo- Abs Latest Ref Range: 0.0-9.0 IU/mL  11.1 (H)   Thyrotropin Receptor Abs Latest Ref Range: <=1.75 IU/L  2.53 (H)   Anti-Scl-70 Latest Ref Range: 0-40 AU/mL 0    Anti-Centromere B Ab Latest Ref Range: 0-40 AU/mL 2    Antinuclear Antibody Latest Ref Range: None  Detected  None Detected    Rnp Antibodies Latest Ref Range: 0-40 AU/mL 0    Smith Antibodies Latest Ref Range: 0-40 AU/mL 0    SSA 52 (R0)(SANJUANA) Ab, IgG Latest Ref Range: 0-40 AU/mL 6    SSA 60 (R0)(SANJUANA) Ab, IgG Latest Ref Range: 0-40 AU/mL 14    Sjogren'S Anti-Ss-B Latest Ref Range: 0-40 AU/mL 1    ANCA IgG Latest Ref Range: <1:20  <1:20      Evaluation showed an elevated CRP of 1.76 and a elevated ESR of 20    Hand x-ray from March 16, 2017 showed no evidence of any marginal erosions or periosteal reaction.    Past medical history: Graves, autoimmune urticaria, asthma, high blood pressure diagnosed in 1995.  No miscarriages.  She did have HTN/toxemia during her second pregnancy.  She denies any blood clot history.  Adult onset asthma. She had jaw surgery in middle school. In the 1990s she had tubal ligation. 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      Family history: Rheumatoid arthritis, mom and great aunt had arthritis. First cousin had thyroid Hashimoto's. Father had brain aneurysm.    Social history: Former smoker (social smoker once a week if any), occasional alcohol use. No IV drug use.    Past Medical History:   Diagnosis Date   • Bursitis of shoulder, left    • Chickenpox    • Divehi measles    • Graves disease    • Hypertension    • Rheumatoid arthritis(714.0)    • Rotator cuff tear, left    • Seasonal allergies    • Urticaria, chronic 1/3/2013     Past Surgical History:   Procedure Laterality Date   • CARPAL TUNNEL RELEASE     • CARPAL TUNNEL RELEASE     • CUBITAL TUNNEL RELEASE     • OTHER      jaw surgery   • ROTATOR CUFF REPAIR      left   • SHOULDER SURGERY     • TUBAL COAGULATION LAPAROSCOPIC BILATERAL       Allergies   Allergen Reactions   • Erythromycin    • Keflex    • Pcn [Penicillins]      Outpatient Encounter Prescriptions as of 5/29/2018   Medication Sig Dispense Refill   • Eszopiclone 2 MG Tab Take 1 Tab by mouth every  bedtime for 90 days. 30 Tab 2   • triamcinolone acetonide (KENALOG) 0.025 % Cream Apply to affected area twice daily for 2 weeks 15 g 0   • liothyronine (CYTOMEL) 25 MCG Tab Take 1 Tab by mouth every day. 30 Tab 3   • Probiotic Product (PROBIOTIC PO) Take 1 Tab by mouth every day.     • levothyroxine (SYNTHROID) 300 MCG Tab Take 1 Tab by mouth every day. 90 Tab 3   • losartan (COZAAR) 100 MG Tab TAKE 1 TABLET BY MOUTH EVERY DAY 90 Tab 1   • NIFEdipine (ADALAT CC) 60 MG CR tablet Take 1 Tab by mouth every day. 30 Tab 2   • Multiple Vitamins-Minerals (ONE-A-DAY 50 PLUS PO) Take  by mouth.     • tolterodine ER (DETROL-LA) 2 MG CAPSULE SR 24 HR Take 1 Cap by mouth every day. 30 Cap 11   • Albuterol Sulfate (PROAIR HFA INH) Inhale  by mouth.     • ibuprofen (MOTRIN) 800 MG Tab Take 1 Tab by mouth every 8 hours as needed for Mild Pain. 90 Tab 3     No facility-administered encounter medications on file as of 5/29/2018.        Social History     Social History   • Marital status:      Spouse name: N/A   • Number of children: N/A   • Years of education: N/A     Occupational History   • Not on file.     Social History Main Topics   • Smoking status: Former Smoker     Packs/day: 0.25     Types: Cigarettes     Quit date: 6/22/2015   • Smokeless tobacco: Never Used   • Alcohol use 0.0 oz/week      Comment: Occassionally   • Drug use: No   • Sexual activity: Yes     Other Topics Concern   • Not on file     Social History Narrative   • No narrative on file      History   Smoking Status   • Former Smoker   • Packs/day: 0.25   • Types: Cigarettes   • Quit date: 6/22/2015   Smokeless Tobacco   • Never Used     History   Alcohol Use   • 0.0 oz/week     Comment: Occassionally     History   Drug Use No      OB History   No data available      Patient's last menstrual period was 09/11/2016.    USHA ALEXANDRE     Family History   Problem Relation Age of Onset   • Arthritis Mother      RA   • Cancer Mother      breast   • Hypertension  Father    • Cancer Maternal Grandmother        Review of Systems   Constitutional: Negative for chills and fever.   Cardiovascular: Negative for chest pain.   Musculoskeletal: Positive for joint pain.   Skin: Negative for rash.        Objective:   /82   Pulse (!) 103   Temp 37.6 °C (99.7 °F)   Resp 16   Wt (!) 141 kg (310 lb 12.8 oz)   LMP 09/11/2016   SpO2 95%   BMI 50.19 kg/m²     Physical Exam   Constitutional: She is oriented to person, place, and time. She appears well-developed and well-nourished. No distress.   HENT:   Head: Normocephalic and atraumatic.   Right Ear: External ear normal.   Left Ear: External ear normal.   Eyes: Conjunctivae are normal. Right eye exhibits no discharge. Left eye exhibits no discharge.   Pulmonary/Chest: No stridor. No respiratory distress.   Musculoskeletal: She exhibits no edema.   No active synovitis of the upper extremities   Neurological: She is alert and oriented to person, place, and time. No cranial nerve deficit.   Skin: Skin is warm and dry. No rash noted. She is not diaphoretic. No erythema. No pallor.   Psychiatric: She has a normal mood and affect. Her behavior is normal. Judgment and thought content normal.       Assessment:     1. Subclinical hyperthyroidism     2. Seronegative rheumatoid arthritis (HCC)  VITAMIN B6    CRYOGLOBULIN QL SERUM RFLX    ANTI-DNA (DS)    COMPLEMENT C3    COMPLEMENT C4    WESTERGREN SED RATE    CRP QUANTITIVE (NON-CARDIAC)    CBC WITH DIFFERENTIAL    COMP METABOLIC PANEL    CHROMATIN AB,IGG    ANGIOTENSIN I CONVERTING ENZYME   3. Uncomplicated asthma, unspecified asthma severity, unspecified whether persistent  VITAMIN B6    CRYOGLOBULIN QL SERUM RFLX    ANTI-DNA (DS)    COMPLEMENT C3    COMPLEMENT C4    WESTERGREN SED RATE    CRP QUANTITIVE (NON-CARDIAC)    CBC WITH DIFFERENTIAL    COMP METABOLIC PANEL    CHROMATIN AB,IGG    ANGIOTENSIN I CONVERTING ENZYME   4. Neuropathy (HCC)  REFERRAL TO NEURODIAGNOSTICS  (EEG,EP,EMG/NCS/DBS) Modality Requested: EMG/NCS-Comment Extremities     Labs:      Lab Results   Component Value Date/Time    QNTTBGOLD Negative 10/20/2017 03:40 PM     Lab Results   Component Value Date/Time    HEPBCORTOT Negative 10/20/2017 03:40 PM    HEPBSAG Negative 10/20/2017 03:40 PM     Lab Results   Component Value Date/Time    HEPCAB Negative 10/20/2017 03:40 PM     Lab Results   Component Value Date/Time    SODIUM 140 10/20/2017 03:40 PM    POTASSIUM 3.6 10/20/2017 03:40 PM    CHLORIDE 105 10/20/2017 03:40 PM    CO2 24 10/20/2017 03:40 PM    GLUCOSE 100 (H) 10/20/2017 03:40 PM    BUN 21 10/20/2017 03:40 PM    CREATININE 0.64 10/20/2017 03:40 PM      Lab Results   Component Value Date/Time    WBC 10.6 10/20/2017 03:40 PM    RBC 4.90 10/20/2017 03:40 PM    HEMOGLOBIN 14.5 10/20/2017 03:40 PM    HEMATOCRIT 43.2 03/01/2018 12:45 PM    MCV 87.3 10/20/2017 03:40 PM    MCH 29.6 10/20/2017 03:40 PM    MCHC 33.9 10/20/2017 03:40 PM    MPV 9.4 10/20/2017 03:40 PM    NEUTSPOLYS 63.30 10/20/2017 03:40 PM    LYMPHOCYTES 26.50 10/20/2017 03:40 PM    MONOCYTES 6.70 10/20/2017 03:40 PM    EOSINOPHILS 2.70 10/20/2017 03:40 PM    BASOPHILS 0.50 10/20/2017 03:40 PM    HYPOCHROMIA 1+ 07/08/2014 09:03 AM      Lab Results   Component Value Date/Time    CALCIUM 9.1 10/20/2017 03:40 PM    ASTSGOT 12 10/20/2017 03:40 PM    ALTSGPT 15 10/20/2017 03:40 PM    ALKPHOSPHAT 49 10/20/2017 03:40 PM    TBILIRUBIN 0.2 10/20/2017 03:40 PM    ALBUMIN 3.8 10/20/2017 03:40 PM    TOTPROTEIN 6.9 10/20/2017 03:40 PM     Lab Results   Component Value Date/Time    URICACID 3.1 04/18/2017 03:37 PM    RHEUMFACTN <10 03/14/2017 08:14 AM    CCPANTIBODY 3 03/14/2017 08:14 AM    ANTINUCAB None Detected 04/18/2017 03:37 PM     Lab Results   Component Value Date/Time    SEDRATEWES 16 07/11/2017 11:59 AM    CREACTPROT 1.51 (H) 07/11/2017 11:59 AM     No results found for: TRE YOUNGVTINTERREGINO  Lab Results   Component Value Date/Time    I7MFNPYERUN  184.0 04/18/2017 03:37 PM    G7DIKBKIUMN 48.0 04/18/2017 03:37 PM     Lab Results   Component Value Date/Time    ANTIDNADS None Detected 04/18/2017 03:37 PM    RNPAB 0 04/18/2017 03:37 PM    SMITHAB 0 04/18/2017 03:37 PM    WBGZBNC64 0 04/18/2017 03:37 PM    CENTROMBAB 2 04/18/2017 03:37 PM     Lab Results   Component Value Date/Time    ANTIDNADS None Detected 04/18/2017 03:37 PM    ANCAIGG <1:20 04/18/2017 03:37 PM    I1GWINZXBFX 184.0 04/18/2017 03:37 PM    RNPAB 0 04/18/2017 03:37 PM    ANTISSBSJ 1 04/18/2017 03:37 PM     Lab Results   Component Value Date/Time    COLORURINE Lt. Yellow 04/18/2017 03:37 PM    SPECGRAVITY 1.021 04/18/2017 03:37 PM    PHURINE 5.5 04/18/2017 03:37 PM    GLUCOSEUR Negative 04/18/2017 03:37 PM    KETONES Negative 04/18/2017 03:37 PM    PROTEINURIN Negative 04/18/2017 03:37 PM     No results found for: TOTPROTUR, TOTALVOLUME, [id number]  Lab Results   Component Value Date/Time    SSA60 14 04/18/2017 03:37 PM    SSA52 6 04/18/2017 03:37 PM     Lab Results   Component Value Date/Time    HBA1C 5.3 01/18/2013 10:59 AM     No results found for: CPKTOTAL  Lab Results   Component Value Date/Time    G6PD 12.6 04/18/2017 03:37 PM     Lab Results   Component Value Date/Time    GNCJ89XSUN Negative 10/20/2017 03:40 PM     No results found for: ACESERUM  Lab Results   Component Value Date/Time    25HYDROXY 22 (L) 03/01/2018 12:45 PM     No results found for: TSH, FREEDIR  Lab Results   Component Value Date/Time    TSHULTRASEN <0.005 (L) 03/01/2018 12:45 PM    FREET4 0.79 03/01/2018 12:45 PM     Lab Results   Component Value Date/Time    MICROSOMALA 11.1 (H) 07/11/2017 11:59 AM    ANTITHYROGL <0.9 01/03/2014 10:40 AM     No results found for: IGGLYMEABS  No results found for: ANTIMITOCHO, FACTIN  No results found for: IGA, TTRANSIGA, ENDOIGA  No results found for: FLTYPE, CRYSTALSBDF  No results found for: ISTATICAL  No results found for: ISTATCREAT  No results found for: CTELOPEP  No results found  for: GBMABG  No results found for: PTHINTACT      Medical Decision Making:  Today's Assessment / Status / Plan:     Seronegative rheumatoid arthritis  Will monitor for any active synovitis.      Positive [place]  Thus far could explained by thyroid dysfunction  Still will consider with her neuropathy vasculitis causes.  ANCA was negative.  Consider cryoglobulins, complements  Will also check chromatin antibody.    I reviewed the side effects of methotrexate including but not limited to hepatotoxicity, nausea, hair loss, the importance of taking folic acid, and close monitoring of his labs.      Low back pain  She has features suggestive of an inflammatory pain  HLA B27  Negative 10/20/2017    Autoimmune urticaria  For now stable  Continue plaquenil    Tingling - peripheral neuropathy???  Folate and B 12 reasonable  Will check B6  Will get EMG/NCS    1. Subclinical hyperthyroidism     2. Seronegative rheumatoid arthritis (HCC)  VITAMIN B6    CRYOGLOBULIN QL SERUM RFLX    ANTI-DNA (DS)    COMPLEMENT C3    COMPLEMENT C4    WESTERGREN SED RATE    CRP QUANTITIVE (NON-CARDIAC)    CBC WITH DIFFERENTIAL    COMP METABOLIC PANEL    CHROMATIN AB,IGG    ANGIOTENSIN I CONVERTING ENZYME   3. Uncomplicated asthma, unspecified asthma severity, unspecified whether persistent  VITAMIN B6    CRYOGLOBULIN QL SERUM RFLX    ANTI-DNA (DS)    COMPLEMENT C3    COMPLEMENT C4    WESTERGREN SED RATE    CRP QUANTITIVE (NON-CARDIAC)    CBC WITH DIFFERENTIAL    COMP METABOLIC PANEL    CHROMATIN AB,IGG    ANGIOTENSIN I CONVERTING ENZYME   4. Neuropathy (HCC)  REFERRAL TO NEURODIAGNOSTICS (EEG,EP,EMG/NCS/DBS) Modality Requested: EMG/NCS-Comment Extremities     Return in about 4 months (around 9/29/2018).        She agreed and verbalized her agreement and understanding with the current plan. I answered all questions and concerns she has at this time and advised her to call at any time in there interim with questions or concerns in regards to her  care.    Thank you for allowing me to participate in her care, I will continue to follow closely.

## 2018-05-29 NOTE — LETTER
[facility]   [address], [address]  [place] [address]  Phone: [phone]  Fax: [phone]              Encounter Date: 5/29/2018    Dear Dr. Vegas ref. provider found,    It was a pleasure seeing your patient, Gabriella Clifford, on 5/29/2018. Diagnoses of Subclinical hyperthyroidism, Seronegative rheumatoid arthritis (HCC), Uncomplicated asthma, unspecified asthma severity, unspecified whether persistent, and Neuropathy (HCC) were pertinent to this visit.     Please find attached progress note which includes the history I obtained from Ms. Clifford, my physical examination findings, my impression and recommendations.      Once again, it was a pleasure participating in your patient's care.  Please feel free to contact me if you have any questions or if I can be of any further assistance to your patients.      Sincerely,    Maylin Willingham M.D.  Electronically Signed          PROGRESS NOTE:  Subjective:   Date of Consultation:5/29/2018  2:09 PM  Primary care physician:VALERIE Lopez      Reason for Consultation:  Ms. Clifford  is a pleasant 51 y.o. year old female who presented with follow-up of a polyarthralgia    24 and Me  The variant tested is a change from a C to a T in the DNA sequence of the HLA-DQA1 gene. 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  She overall is managing her pain and swelling.  She continues to have symptoms of neuropathy.    Urticaria (autoimmune)  Quiescent  No new lesions    Low back pain  Sometimes has lower back pain    Asthma  Went to visit daughter in [facility] and was doing well then flared when she returned to [place].    RHEUM HISTORY:  Ms. Gabriella Clifford first presented to me with a history of autoimmune urticaria and elevated CRP.  She had been previously managed with plaquenil for her autoimmune urticaria  as well as histamine blockers.  More recently to her initial visit she reports worsening hand pain and bilateral knee pain.  She had managed with 800 mg ibuprofen.  She also reported 1-2 hours of morning stiffness with swollen joint and stiff hands.  For her evaluation she had positive TPO antibody.  She was started on levothyroxine 6/2017.  In 4/2016 we started her on plaquenil 400 mg po q day.        Pertinent serologies  Rheumatoid factor is negative  Anti-CCP is negative    Results for AIRAM WATSON (MRN [id number]) as of 8/3/2017 13:30   Ref. Range 4/18/2017 15:37 7/11/2017 11:59   Anti-Dna -Ds Latest Ref Range: None Detected  None Detected    Microsomal -Tpo- Abs Latest Ref Range: 0.0-9.0 IU/mL  11.1 (H)   Thyrotropin Receptor Abs Latest Ref Range: <=1.75 IU/L  2.53 (H)   Anti-Scl-70 Latest Ref Range: 0-40 AU/mL 0    Anti-Centromere B Ab Latest Ref Range: 0-40 AU/mL 2    Antinuclear Antibody Latest Ref Range: None Detected  None Detected    Rnp Antibodies Latest Ref Range: 0-40 AU/mL 0    Smith Antibodies Latest Ref Range: 0-40 AU/mL 0    SSA 52 (R0)(SANJUANA) Ab, IgG Latest Ref Range: 0-40 AU/mL 6    SSA 60 (R0)(SANJUANA) Ab, IgG Latest Ref Range: 0-40 AU/mL 14    Sjogren'S Anti-Ss-B Latest Ref Range: 0-40 AU/mL 1    ANCA IgG Latest Ref Range: <1:20  <1:20      Evaluation showed an elevated CRP of 1.76 and a elevated ESR of 20    Hand x-ray from March 16, 2017 showed no evidence of any marginal erosions or periosteal reaction.    Past medical history: Graves, autoimmune urticaria, asthma, high blood pressure diagnosed in 1995.  No miscarriages.  She did have HTN/toxemia during her second pregnancy.  She denies any blood clot history.  Adult onset asthma. She had jaw surgery in middle school. In the 1990s she had tubal ligation. 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      Family history: Rheumatoid arthritis, mom and great aunt had arthritis. First  cousin had thyroid Hashimoto's. Father had brain aneurysm.    Social history: Former smoker (social smoker once a week if any), occasional alcohol use. No IV drug use.    Past Medical History:   Diagnosis Date   • Bursitis of shoulder, left    • Chickenpox    • Armenian measles    • Graves disease    • Hypertension    • Rheumatoid arthritis(714.0)    • Rotator cuff tear, left    • Seasonal allergies    • Urticaria, chronic 1/3/2013     Past Surgical History:   Procedure Laterality Date   • CARPAL TUNNEL RELEASE     • CARPAL TUNNEL RELEASE     • CUBITAL TUNNEL RELEASE     • OTHER      jaw surgery   • ROTATOR CUFF REPAIR      left   • SHOULDER SURGERY     • TUBAL COAGULATION LAPAROSCOPIC BILATERAL       Allergies   Allergen Reactions   • Erythromycin    • Keflex    • Pcn [Penicillins]      Outpatient Encounter Prescriptions as of 5/29/2018   Medication Sig Dispense Refill   • Eszopiclone 2 MG Tab Take 1 Tab by mouth every bedtime for 90 days. 30 Tab 2   • triamcinolone acetonide (KENALOG) 0.025 % Cream Apply to affected area twice daily for 2 weeks 15 g 0   • liothyronine (CYTOMEL) 25 MCG Tab Take 1 Tab by mouth every day. 30 Tab 3   • Probiotic Product (PROBIOTIC PO) Take 1 Tab by mouth every day.     • levothyroxine (SYNTHROID) 300 MCG Tab Take 1 Tab by mouth every day. 90 Tab 3   • losartan (COZAAR) 100 MG Tab TAKE 1 TABLET BY MOUTH EVERY DAY 90 Tab 1   • NIFEdipine (ADALAT CC) 60 MG CR tablet Take 1 Tab by mouth every day. 30 Tab 2   • Multiple Vitamins-Minerals (ONE-A-DAY 50 PLUS PO) Take  by mouth.     • tolterodine ER (DETROL-LA) 2 MG CAPSULE SR 24 HR Take 1 Cap by mouth every day. 30 Cap 11   • Albuterol Sulfate (PROAIR HFA INH) Inhale  by mouth.     • ibuprofen (MOTRIN) 800 MG Tab Take 1 Tab by mouth every 8 hours as needed for Mild Pain. 90 Tab 3     No facility-administered encounter medications on file as of 5/29/2018.        Social History     Social History   • Marital status:      Spouse name: N/A     • Number of children: N/A   • Years of education: N/A     Occupational History   • Not on file.     Social History Main Topics   • Smoking status: Former Smoker     Packs/day: 0.25     Types: Cigarettes     Quit date: 6/22/2015   • Smokeless tobacco: Never Used   • Alcohol use 0.0 oz/week      Comment: Occassionally   • Drug use: No   • Sexual activity: Yes     Other Topics Concern   • Not on file     Social History Narrative   • No narrative on file      History   Smoking Status   • Former Smoker   • Packs/day: 0.25   • Types: Cigarettes   • Quit date: 6/22/2015   Smokeless Tobacco   • Never Used     History   Alcohol Use   • 0.0 oz/week     Comment: Occassionally     History   Drug Use No      OB History   No data available      Patient's last menstrual period was 09/11/2016.    G P A L     Family History   Problem Relation Age of Onset   • Arthritis Mother      RA   • Cancer Mother      breast   • Hypertension Father    • Cancer Maternal Grandmother        Review of Systems   Constitutional: Negative for chills and fever.   Cardiovascular: Negative for chest pain.   Musculoskeletal: Positive for joint pain.   Skin: Negative for rash.        Objective:   /82   Pulse (!) 103   Temp 37.6 °C (99.7 °F)   Resp 16   Wt (!) 141 kg (310 lb 12.8 oz)   LMP 09/11/2016   SpO2 95%   BMI 50.19 kg/m²      Physical Exam   Constitutional: She is oriented to person, place, and time. She appears well-developed and well-nourished. No distress.   HENT:   Head: Normocephalic and atraumatic.   Right Ear: External ear normal.   Left Ear: External ear normal.   Eyes: Conjunctivae are normal. Right eye exhibits no discharge. Left eye exhibits no discharge.   Pulmonary/Chest: No stridor. No respiratory distress.   Musculoskeletal: She exhibits no edema.   No active synovitis of the upper extremities   Neurological: She is alert and oriented to person, place, and time. No cranial nerve deficit.   Skin: Skin is warm and dry. No  rash noted. She is not diaphoretic. No erythema. No pallor.   Psychiatric: She has a normal mood and affect. Her behavior is normal. Judgment and thought content normal.       Assessment:     1. Subclinical hyperthyroidism     2. Seronegative rheumatoid arthritis (HCC)  VITAMIN B6    CRYOGLOBULIN QL SERUM RFLX    ANTI-DNA (DS)    COMPLEMENT C3    COMPLEMENT C4    WESTERGREN SED RATE    CRP QUANTITIVE (NON-CARDIAC)    CBC WITH DIFFERENTIAL    COMP METABOLIC PANEL    CHROMATIN AB,IGG    ANGIOTENSIN I CONVERTING ENZYME   3. Uncomplicated asthma, unspecified asthma severity, unspecified whether persistent  VITAMIN B6    CRYOGLOBULIN QL SERUM RFLX    ANTI-DNA (DS)    COMPLEMENT C3    COMPLEMENT C4    WESTERGREN SED RATE    CRP QUANTITIVE (NON-CARDIAC)    CBC WITH DIFFERENTIAL    COMP METABOLIC PANEL    CHROMATIN AB,IGG    ANGIOTENSIN I CONVERTING ENZYME   4. Neuropathy (HCC)  REFERRAL TO NEURODIAGNOSTICS (EEG,EP,EMG/NCS/DBS) Modality Requested: EMG/NCS-Comment Extremities     Labs:      Lab Results   Component Value Date/Time    QNTTBGOLD Negative 10/20/2017 03:40 PM     Lab Results   Component Value Date/Time    HEPBCORTOT Negative 10/20/2017 03:40 PM    HEPBSAG Negative 10/20/2017 03:40 PM     Lab Results   Component Value Date/Time    HEPCAB Negative 10/20/2017 03:40 PM     Lab Results   Component Value Date/Time    SODIUM 140 10/20/2017 03:40 PM    POTASSIUM 3.6 10/20/2017 03:40 PM    CHLORIDE 105 10/20/2017 03:40 PM    CO2 24 10/20/2017 03:40 PM    GLUCOSE 100 (H) 10/20/2017 03:40 PM    BUN 21 10/20/2017 03:40 PM    CREATININE 0.64 10/20/2017 03:40 PM      Lab Results   Component Value Date/Time    WBC 10.6 10/20/2017 03:40 PM    RBC 4.90 10/20/2017 03:40 PM    HEMOGLOBIN 14.5 10/20/2017 03:40 PM    HEMATOCRIT 43.2 03/01/2018 12:45 PM    MCV 87.3 10/20/2017 03:40 PM    MCH 29.6 10/20/2017 03:40 PM    MCHC 33.9 10/20/2017 03:40 PM    MPV 9.4 10/20/2017 03:40 PM    NEUTSPOLYS 63.30 10/20/2017 03:40 PM    LYMPHOCYTES  26.50 10/20/2017 03:40 PM    MONOCYTES 6.70 10/20/2017 03:40 PM    EOSINOPHILS 2.70 10/20/2017 03:40 PM    BASOPHILS 0.50 10/20/2017 03:40 PM    HYPOCHROMIA 1+ 07/08/2014 09:03 AM      Lab Results   Component Value Date/Time    CALCIUM 9.1 10/20/2017 03:40 PM    ASTSGOT 12 10/20/2017 03:40 PM    ALTSGPT 15 10/20/2017 03:40 PM    ALKPHOSPHAT 49 10/20/2017 03:40 PM    TBILIRUBIN 0.2 10/20/2017 03:40 PM    ALBUMIN 3.8 10/20/2017 03:40 PM    TOTPROTEIN 6.9 10/20/2017 03:40 PM     Lab Results   Component Value Date/Time    URICACID 3.1 04/18/2017 03:37 PM    RHEUMFACTN <10 03/14/2017 08:14 AM    CCPANTIBODY 3 03/14/2017 08:14 AM    ANTINUCAB None Detected 04/18/2017 03:37 PM     Lab Results   Component Value Date/Time    SEDRATEWES 16 07/11/2017 11:59 AM    CREACTPROT 1.51 (H) 07/11/2017 11:59 AM     No results found for: HANNAH, VVTINTERP  Lab Results   Component Value Date/Time    D8NPHKSNNWR 184.0 04/18/2017 03:37 PM    N1TTRZQZRUX 48.0 04/18/2017 03:37 PM     Lab Results   Component Value Date/Time    ANTIDNADS None Detected 04/18/2017 03:37 PM    RNPAB 0 04/18/2017 03:37 PM    SMITHAB 0 04/18/2017 03:37 PM    LNZUTIU16 0 04/18/2017 03:37 PM    CENTROMBAB 2 04/18/2017 03:37 PM     Lab Results   Component Value Date/Time    ANTIDNADS None Detected 04/18/2017 03:37 PM    ANCAIGG <1:20 04/18/2017 03:37 PM    U1JWLYWPSUD 184.0 04/18/2017 03:37 PM    RNPAB 0 04/18/2017 03:37 PM    ANTISSBSJ 1 04/18/2017 03:37 PM     Lab Results   Component Value Date/Time    COLORURINE Lt. Yellow 04/18/2017 03:37 PM    SPECGRAVITY 1.021 04/18/2017 03:37 PM    PHURINE 5.5 04/18/2017 03:37 PM    GLUCOSEUR Negative 04/18/2017 03:37 PM    KETONES Negative 04/18/2017 03:37 PM    PROTEINURIN Negative 04/18/2017 03:37 PM     No results found for: TOTPROTUR, TOTALVOLUME, QFEOSQDH21  Lab Results   Component Value Date/Time    SSA60 14 04/18/2017 03:37 PM    SSA52 6 04/18/2017 03:37 PM     Lab Results   Component Value Date/Time    HBA1C 5.3  01/18/2013 10:59 AM     No results found for: CPKTOTAL  Lab Results   Component Value Date/Time    G6PD 12.6 04/18/2017 03:37 PM     Lab Results   Component Value Date/Time    GUNV46LOXV Negative 10/20/2017 03:40 PM     No results found for: ACESERUM  Lab Results   Component Value Date/Time    25HYDROXY 22 (L) 03/01/2018 12:45 PM     No results found for: TSH, FREEDIR  Lab Results   Component Value Date/Time    TSHULTRASEN <0.005 (L) 03/01/2018 12:45 PM    FREET4 0.79 03/01/2018 12:45 PM     Lab Results   Component Value Date/Time    MICROSOMALA 11.1 (H) 07/11/2017 11:59 AM    ANTITHYROGL <0.9 01/03/2014 10:40 AM     No results found for: IGGLYMEABS  No results found for: ANTIMITOCHO, FACTIN  No results found for: IGA, TTRANSIGA, ENDOIGA  No results found for: FLTYPE, CRYSTALSBDF  No results found for: ISTATICAL  No results found for: ISTATCREAT  No results found for: CTELOPEP  No results found for: GBMABG  No results found for: PTHINTACT      Medical Decision Making:  Today's Assessment / Status / Plan:     Seronegative rheumatoid arthritis  Will monitor for any active synovitis.      Positive AFRICA  Thus far could explained by thyroid dysfunction  Still will consider with her neuropathy vasculitis causes.  ANCA was negative.  Consider cryoglobulins, complements  Will also check chromatin antibody.    I reviewed the side effects of methotrexate including but not limited to hepatotoxicity, nausea, hair loss, the importance of taking folic acid, and close monitoring of his labs.      Low back pain  She has features suggestive of an inflammatory pain  HLA B27  Negative 10/20/2017    Autoimmune urticaria  For now stable  Continue plaquenil    Tingling - peripheral neuropathy???  Folate and B 12 reasonable  Will check B6  Will get EMG/NCS    1. Subclinical hyperthyroidism     2. Seronegative rheumatoid arthritis (HCC)  VITAMIN B6    CRYOGLOBULIN QL SERUM RFLX    ANTI-DNA (DS)    COMPLEMENT C3    COMPLEMENT C4     WESTERGREN SED RATE    CRP QUANTITIVE (NON-CARDIAC)    CBC WITH DIFFERENTIAL    COMP METABOLIC PANEL    CHROMATIN AB,IGG    ANGIOTENSIN I CONVERTING ENZYME   3. Uncomplicated asthma, unspecified asthma severity, unspecified whether persistent  VITAMIN B6    CRYOGLOBULIN QL SERUM RFLX    ANTI-DNA (DS)    COMPLEMENT C3    COMPLEMENT C4    WESTERGREN SED RATE    CRP QUANTITIVE (NON-CARDIAC)    CBC WITH DIFFERENTIAL    COMP METABOLIC PANEL    CHROMATIN AB,IGG    ANGIOTENSIN I CONVERTING ENZYME   4. Neuropathy (HCC)  REFERRAL TO NEURODIAGNOSTICS (EEG,EP,EMG/NCS/DBS) Modality Requested: EMG/NCS-Comment Extremities     Return in about 4 months (around 9/29/2018).        She agreed and verbalized her agreement and understanding with the current plan. I answered all questions and concerns she has at this time and advised her to call at any time in there interim with questions or concerns in regards to her care.    Thank you for allowing me to participate in her care, I will continue to follow closely.

## 2018-05-30 ENCOUNTER — HOSPITAL ENCOUNTER (OUTPATIENT)
Dept: LAB | Facility: MEDICAL CENTER | Age: 53
End: 2018-05-30
Attending: INTERNAL MEDICINE
Payer: COMMERCIAL

## 2018-05-30 DIAGNOSIS — J45.909 UNCOMPLICATED ASTHMA, UNSPECIFIED ASTHMA SEVERITY, UNSPECIFIED WHETHER PERSISTENT: ICD-10-CM

## 2018-05-30 DIAGNOSIS — M06.00 SERONEGATIVE RHEUMATOID ARTHRITIS (HCC): ICD-10-CM

## 2018-05-30 DIAGNOSIS — L98.9 SKIN LESION: ICD-10-CM

## 2018-05-30 LAB
ALBUMIN SERPL BCP-MCNC: 3.8 G/DL (ref 3.2–4.9)
ALBUMIN/GLOB SERPL: 1.2 G/DL
ALP SERPL-CCNC: 59 U/L (ref 30–99)
ALT SERPL-CCNC: 14 U/L (ref 2–50)
ANION GAP SERPL CALC-SCNC: 9 MMOL/L (ref 0–11.9)
AST SERPL-CCNC: 13 U/L (ref 12–45)
BASOPHILS # BLD AUTO: 0.7 % (ref 0–1.8)
BASOPHILS # BLD: 0.07 K/UL (ref 0–0.12)
BILIRUB SERPL-MCNC: 0.2 MG/DL (ref 0.1–1.5)
BUN SERPL-MCNC: 23 MG/DL (ref 8–22)
C3 SERPL-MCNC: 166 MG/DL (ref 87–200)
C4 SERPL-MCNC: 47 MG/DL (ref 19–52)
CALCIUM SERPL-MCNC: 9.1 MG/DL (ref 8.5–10.5)
CHLORIDE SERPL-SCNC: 107 MMOL/L (ref 96–112)
CO2 SERPL-SCNC: 22 MMOL/L (ref 20–33)
CREAT SERPL-MCNC: 0.8 MG/DL (ref 0.5–1.4)
CRP SERPL HS-MCNC: 1.49 MG/DL (ref 0–0.75)
EOSINOPHIL # BLD AUTO: 0.2 K/UL (ref 0–0.51)
EOSINOPHIL NFR BLD: 2.1 % (ref 0–6.9)
ERYTHROCYTE [DISTWIDTH] IN BLOOD BY AUTOMATED COUNT: 42.2 FL (ref 35.9–50)
ERYTHROCYTE [SEDIMENTATION RATE] IN BLOOD BY WESTERGREN METHOD: 19 MM/HOUR (ref 0–30)
GLOBULIN SER CALC-MCNC: 3.3 G/DL (ref 1.9–3.5)
GLUCOSE SERPL-MCNC: 118 MG/DL (ref 65–99)
HCT VFR BLD AUTO: 42.8 % (ref 37–47)
HGB BLD-MCNC: 14.2 G/DL (ref 12–16)
IMM GRANULOCYTES # BLD AUTO: 0.02 K/UL (ref 0–0.11)
IMM GRANULOCYTES NFR BLD AUTO: 0.2 % (ref 0–0.9)
LYMPHOCYTES # BLD AUTO: 3.28 K/UL (ref 1–4.8)
LYMPHOCYTES NFR BLD: 34.5 % (ref 22–41)
MCH RBC QN AUTO: 29.3 PG (ref 27–33)
MCHC RBC AUTO-ENTMCNC: 33.2 G/DL (ref 33.6–35)
MCV RBC AUTO: 88.4 FL (ref 81.4–97.8)
MONOCYTES # BLD AUTO: 0.57 K/UL (ref 0–0.85)
MONOCYTES NFR BLD AUTO: 6 % (ref 0–13.4)
NEUTROPHILS # BLD AUTO: 5.36 K/UL (ref 2–7.15)
NEUTROPHILS NFR BLD: 56.5 % (ref 44–72)
NRBC # BLD AUTO: 0 K/UL
NRBC BLD-RTO: 0 /100 WBC
PLATELET # BLD AUTO: 322 K/UL (ref 164–446)
PMV BLD AUTO: 9.6 FL (ref 9–12.9)
POTASSIUM SERPL-SCNC: 4.1 MMOL/L (ref 3.6–5.5)
PROT SERPL-MCNC: 7.1 G/DL (ref 6–8.2)
RBC # BLD AUTO: 4.84 M/UL (ref 4.2–5.4)
SODIUM SERPL-SCNC: 138 MMOL/L (ref 135–145)
WBC # BLD AUTO: 9.5 K/UL (ref 4.8–10.8)

## 2018-05-30 PROCEDURE — 85652 RBC SED RATE AUTOMATED: CPT

## 2018-05-30 PROCEDURE — 84207 ASSAY OF VITAMIN B-6: CPT

## 2018-05-30 PROCEDURE — 83516 IMMUNOASSAY NONANTIBODY: CPT

## 2018-05-30 PROCEDURE — 85025 COMPLETE CBC W/AUTO DIFF WBC: CPT

## 2018-05-30 PROCEDURE — 36415 COLL VENOUS BLD VENIPUNCTURE: CPT

## 2018-05-30 PROCEDURE — 86225 DNA ANTIBODY NATIVE: CPT

## 2018-05-30 PROCEDURE — 86160 COMPLEMENT ANTIGEN: CPT

## 2018-05-30 PROCEDURE — 82164 ANGIOTENSIN I ENZYME TEST: CPT

## 2018-05-30 PROCEDURE — 80053 COMPREHEN METABOLIC PANEL: CPT

## 2018-05-30 PROCEDURE — 86140 C-REACTIVE PROTEIN: CPT

## 2018-05-30 PROCEDURE — 82595 ASSAY OF CRYOGLOBULIN: CPT

## 2018-06-01 LAB
ACE SERPL-CCNC: 32 U/L (ref 9–67)
CHROMATIN IGG SERPL-ACNC: 17 UNITS (ref 0–19)
DSDNA AB TITR SER CLIF: NORMAL {TITER}

## 2018-06-02 LAB — VIT B6 SERPL-MCNC: 24.5 NMOL/L (ref 20–125)

## 2018-06-04 LAB — CRYOGLOB SER QL 3D COLD INC: NORMAL

## 2018-06-09 DIAGNOSIS — I10 ESSENTIAL HYPERTENSION: ICD-10-CM

## 2018-06-11 RX ORDER — LOSARTAN POTASSIUM 100 MG/1
TABLET ORAL
Qty: 90 TAB | Refills: 1 | Status: SHIPPED | OUTPATIENT
Start: 2018-06-11

## 2018-06-11 NOTE — TELEPHONE ENCOUNTER
Refill X 6 months, sent to pharmacy.Pt. Seen in the last 6 months per protocol.   Lab Results   Component Value Date/Time    SODIUM 138 05/30/2018 12:06 PM    POTASSIUM 4.1 05/30/2018 12:06 PM    CHLORIDE 107 05/30/2018 12:06 PM    CO2 22 05/30/2018 12:06 PM    GLUCOSE 118 (H) 05/30/2018 12:06 PM    BUN 23 (H) 05/30/2018 12:06 PM    CREATININE 0.80 05/30/2018 12:06 PM

## 2018-06-11 NOTE — TELEPHONE ENCOUNTER
Was the patient seen in the last year in this department? Yes     Does patient have an active prescription for medications requested? No     Received Request Via: Pharmacy    Pt met protocol?: Yes     Last OV 05/2018  BP Readings from Last 1 Encounters:   05/29/18 126/82

## 2018-06-13 DIAGNOSIS — N39.46 URINARY INCONTINENCE, MIXED: ICD-10-CM

## 2018-06-13 RX ORDER — TOLTERODINE 2 MG/1
2 CAPSULE, EXTENDED RELEASE ORAL DAILY
Qty: 90 CAP | Refills: 0 | Status: SHIPPED | OUTPATIENT
Start: 2018-06-13

## 2018-06-13 NOTE — TELEPHONE ENCOUNTER
----- Message from Gabriella Clifford sent at 6/12/2018 10:29 PM PDT -----  Regarding: Prescription Question  Contact: [phone]  Hi this is a second request.  I really need my tolterodine tart 2 mg medicne.  I only have a few days left.  When I saw you a short while ago you said you would refil for me so I didnt have to do the video doctor thing again. Thanks!

## 2018-06-13 NOTE — TELEPHONE ENCOUNTER
Pt met protocol?: Yes pt last ov 5/18   Lab Results   Component Value Date/Time    SODIUM 138 05/30/2018 12:06 PM    POTASSIUM 4.1 05/30/2018 12:06 PM    CHLORIDE 107 05/30/2018 12:06 PM    CO2 22 05/30/2018 12:06 PM    GLUCOSE 118 (H) 05/30/2018 12:06 PM    BUN 23 (H) 05/30/2018 12:06 PM    CREATININE 0.80 05/30/2018 12:06 PM

## 2018-06-27 DIAGNOSIS — R52 BURNING PAIN: ICD-10-CM

## 2018-06-27 DIAGNOSIS — I10 ESSENTIAL HYPERTENSION: ICD-10-CM

## 2018-06-27 DIAGNOSIS — I73.00 RAYNAUD'S DISEASE WITHOUT GANGRENE: ICD-10-CM

## 2018-09-28 ENCOUNTER — TELEPHONE (OUTPATIENT)
Dept: SLEEP MEDICINE | Facility: MEDICAL CENTER | Age: 53
End: 2018-09-28

## 2018-09-28 NOTE — TELEPHONE ENCOUNTER
LISAM for pt regarding the VM she left at the [facility]. Pt stated that she need us to fax the interp of her SS that was done back in 2017. I informed the pt that I contact [facility] to confirm that the only thing they need from us was the SS interp. The SS interp is the only thing the need from us and I faxed it over to them on 9/28/18. I advise her to contact us if she has any more questions or concerns.

## 2024-05-18 NOTE — ED AVS SNAPSHOT
Home Care Instructions                                                                                                                Gabriella Clifford   MRN: [id number]    Department:  [facility], Emergency Dept   Date of Visit:  5/11/2017            [facility], Emergency Dept    [address] [facility] [place] [address]    Phone:  [phone]      You were seen by     Leoncio Lantigua M.D.      Your Diagnosis Was     Palpitations     R00.2       Follow-up Information     1. Schedule an appointment as soon as possible for a visit with VALERIE Lopez.    Specialty:  Family Medicine    Contact information    [address] [place] [address] [address] [place] [address] [phone]          2. Follow up with [facility], Emergency Dept.    Specialty:  Emergency Medicine    Why:  immediately if symptoms worsen    Contact information    [address] [facility] [address] [phone]      Medication Information     Review all of your home medications and newly ordered medications with your primary doctor and/or pharmacist as soon as possible. Follow medication instructions as directed by your doctor and/or pharmacist.     Please keep your complete medication list with you and share with your physician. Update the information when medications are discontinued, doses are changed, or new medications (including over-the-counter products) are added; and carry medication information at all times in the event of emergency situations.               Medication List      ASK your doctor about these medications        Instructions    Morning Afternoon Evening Bedtime    * DYMISTA 137-50 MCG/ACT Susp   Generic drug:  Azelastine-Fluticasone                             * Azelastine-Fluticasone 137-50 MCG/ACT Susp   Commonly known as:  DYMISTA        Spray 1 Spray in nose 2 Times a Day.   Dose:  1 Spray                        hydrocodone-acetaminophen 5-325 MG Tabs per tablet    Commonly known as:  NORCO        Take 1 Tab by mouth every 8 hours as needed.   Dose:  1 Tab                        hydroxychloroquine 200 MG Tabs   Commonly known as:  PLAQUENIL        Take 1 Tab by mouth 2 times a day.   Dose:  200 mg                        ibuprofen 800 MG Tabs   Commonly known as:  MOTRIN        Take 1 Tab by mouth every 8 hours as needed for Mild Pain.   Dose:  800 mg                        losartan 50 MG Tabs   Commonly known as:  COZAAR        Take 1 Tab by mouth every day.   Dose:  50 mg                        NIFEdipine 60 MG CR tablet   Commonly known as:  ADALAT CC        Take 1 Tab by mouth every day.   Dose:  60 mg                        PROAIR HFA INH        Inhale  by mouth.                        QVAR 80 MCG/ACT inhaler   Generic drug:  beclomethasone        Inhale 1 Puff by mouth 2 times a day.   Dose:  1 Puff                        * Notice:  This list has 2 medication(s) that are the same as other medications prescribed for you. Read the directions carefully, and ask your doctor or other care provider to review them with you.            Procedures and tests performed during your visit     EKG (NOW)    OLD EKG        Discharge Instructions         Palpitations  A palpitation is the feeling that your heartbeat is irregular or is faster than normal. It may feel like your heart is fluttering or skipping a beat. Palpitations are usually not a serious problem. However, in some cases, you may need further medical evaluation.  CAUSES   Palpitations can be caused by:  · Smoking.  · Caffeine or other stimulants, such as diet pills or energy drinks.  · Alcohol.  · Stress and anxiety.  · Strenuous physical activity.  · Fatigue.  · Certain medicines.  · Heart disease, especially if you have a history of irregular heart rhythms (arrhythmias), such as atrial fibrillation, atrial flutter, or supraventricular tachycardia.  · An improperly working pacemaker or defibrillator.  DIAGNOSIS   To find  the cause of your palpitations, your health care provider will take your medical history and perform a physical exam. Your health care provider may also have you take a test called an ambulatory electrocardiogram (ECG). An ECG records your heartbeat patterns over a 24-hour period. You may also have other tests, such as:  1. Transthoracic echocardiogram (TTE). During echocardiography, sound waves are used to evaluate how blood flows through your heart.  2. Transesophageal echocardiogram (JESSENIA).  3. Cardiac monitoring. This allows your health care provider to monitor your heart rate and rhythm in real time.  4. Holter monitor. This is a portable device that records your heartbeat and can help diagnose heart arrhythmias. It allows your health care provider to track your heart activity for several days, if needed.  5. Stress tests by exercise or by giving medicine that makes the heart beat faster.  TREATMENT   Treatment of palpitations depends on the cause of your symptoms and can vary greatly. Most cases of palpitations do not require any treatment other than time, relaxation, and monitoring your symptoms. Other causes, such as atrial fibrillation, atrial flutter, or supraventricular tachycardia, usually require further treatment.  HOME CARE INSTRUCTIONS   · Avoid:  · Caffeinated coffee, tea, soft drinks, diet pills, and energy drinks.  · Chocolate.  · Alcohol.  · Stop smoking if you smoke.  · Reduce your stress and anxiety. Things that can help you relax include:  · A method of controlling things in your body, such as your heartbeats, with your mind (biofeedback).  · Yoga.  · Meditation.  · Physical activity such as swimming, jogging, or walking.  · Get plenty of rest and sleep.  SEEK MEDICAL CARE IF:   · You continue to have a fast or irregular heartbeat beyond 24 hours.  · Your palpitations occur more often.  SEEK IMMEDIATE MEDICAL CARE IF:  · You have chest pain or shortness of breath.  · You have a severe  headache.  · You feel dizzy or you faint.  MAKE SURE YOU:  · Understand these instructions.  · Will watch your condition.  · Will get help right away if you are not doing well or get worse.     This information is not intended to replace advice given to you by your health care provider. Make sure you discuss any questions you have with your health care provider.     Document Released: 12/15/2001 Document Revised: 12/23/2014 Document Reviewed: 02/15/2013  [employer] Interactive Patient Education ©2016 [employer]  Sleep Apnea   Sleep apnea is a sleep disorder characterized by abnormal pauses in breathing while you sleep. When your breathing pauses, the level of oxygen in your blood decreases. This causes you to move out of deep sleep and into light sleep. As a result, your quality of sleep is poor, and the system that carries your blood throughout your body (cardiovascular system) experiences stress. If sleep apnea remains untreated, the following conditions can develop:  · High blood pressure (hypertension).  · Coronary artery disease.  · Inability to achieve or maintain an erection (impotence).  · Impairment of your thought process (cognitive dysfunction).  There are three types of sleep apnea:  6. Obstructive sleep apnea--Pauses in breathing during sleep because of a blocked airway.  7. Central sleep apnea--Pauses in breathing during sleep because the area of the brain that controls your breathing does not send the correct signals to the muscles that control breathing.  8. Mixed sleep apnea--A combination of both obstructive and central sleep apnea.  RISK FACTORS  The following risk factors can increase your risk of developing sleep apnea:  · Being overweight.  · Smoking.  · Having narrow passages in your nose and throat.  · Being of older age.  · Being male.  · Alcohol use.  · Sedative and tranquilizer use.  · Ethnicity. Among individuals younger than 35 years,  Americans are at increased risk of sleep  "apnea.  SYMPTOMS   · Difficulty staying asleep.  · Daytime sleepiness and fatigue.  · Loss of energy.  · Irritability.  · Loud, heavy snoring.  · Morning headaches.  · Trouble concentrating.  · Forgetfulness.  · Decreased interest in sex.  · Unexplained sleepiness.  DIAGNOSIS   In order to diagnose sleep apnea, your caregiver will perform a physical examination. A sleep study done in the comfort of your own home may be appropriate if you are otherwise healthy. Your caregiver may also recommend that you spend the night in a sleep lab. In the sleep lab, several monitors record information about your heart, lungs, and brain while you sleep. Your leg and arm movements and blood oxygen level are also recorded.  TREATMENT  The following actions may help to resolve mild sleep apnea:  · Sleeping on your side.    · Using a decongestant if you have nasal congestion.    · Avoiding the use of depressants, including alcohol, sedatives, and narcotics.    · Losing weight and modifying your diet if you are overweight.  There also are devices and treatments to help open your airway:  · Oral appliances. These are custom-made mouthpieces that shift your lower jaw forward and slightly open your bite. This opens your airway.  · Devices that create positive airway pressure. This positive pressure \"splints\" your airway open to help you breathe better during sleep. The following devices create positive airway pressure:  ¨ Continuous positive airway pressure (CPAP) device. The CPAP device creates a continuous level of air pressure with an air pump. The air is delivered to your airway through a mask while you sleep. This continuous pressure keeps your airway open.  ¨ Nasal expiratory positive airway pressure (EPAP) device. The EPAP device creates positive air pressure as you exhale. The device consists of single-use valves, which are inserted into each nostril and held in place by adhesive. The valves create very little resistance when you " inhale but create much more resistance when you exhale. That increased resistance creates the positive airway pressure. This positive pressure while you exhale keeps your airway open, making it easier to breath when you inhale again.  ¨ Bilevel positive airway pressure (BPAP) device. The BPAP device is used mainly in patients with central sleep apnea. This device is similar to the CPAP device because it also uses an air pump to deliver continuous air pressure through a mask. However, with the BPAP machine, the pressure is set at two different levels. The pressure when you exhale is lower than the pressure when you inhale.  · Surgery. Typically, surgery is only done if you cannot comply with less invasive treatments or if the less invasive treatments do not improve your condition. Surgery involves removing excess tissue in your airway to create a wider passage way.     This information is not intended to replace advice given to you by your health care provider. Make sure you discuss any questions you have with your health care provider.     Document Released: 12/08/2003 Document Revised: 01/08/2016 Document Reviewed: 04/25/2013  [employer] Interactive Patient Education ©2016 [employer]            Patient Information     Patient Information    Following emergency treatment: all patient requiring follow-up care must return either to a private physician or a clinic if your condition worsens before you are able to obtain further medical attention, please return to the emergency room.     Billing Information    At [facility], we work to make the billing process streamlined for our patients.  Our Representatives are here to answer any questions you may have regarding your hospital bill.  If you have insurance coverage and have supplied your insurance information to us, we will submit a claim to your insurer on your behalf.  Should you have any questions regarding your bill, we can be reached online or by phone as  follows:  Online: You are able pay your bills online or live chat with our representatives about any billing questions you may have. We are here to help Monday - Friday from 8:00am to 7:30pm and 9:00am - 12:00pm on Saturdays.  Please visit [email or web address]  for more information.   Phone:  [phone] or [phone]    Please note that your emergency physician, surgeon, pathologist, radiologist, anesthesiologist, and other specialists are not employed by [facility] and will therefore bill separately for their services.  Please contact them directly for any questions concerning their bills at the numbers below:     Emergency Physician Services:  [phone]  [facility]:  [phone]  [facility]:  [phone]  [facility]:  [phone]    1. Your final bill may vary from the amount quoted upon discharge if all procedures are not complete at that time, or if your doctor has additional procedures of which we are not aware. You will receive an additional bill if you return to the Emergency Department at [facility] for suture removal regardless of the facility of which the sutures were placed.     2. Please arrange for settlement of this account at the emergency registration.    3. All self-pay accounts are due in full at the time of treatment.  If you are unable to meet this obligation then payment is expected within 4-5 days.     4. If you have had radiology studies (CT, X-ray, Ultrasound, MRI), you have received a preliminary result during your emergency department visit. Please contact the radiology department [phone] to receive a copy of your final result. Please discuss the Final result with your primary physician or with the follow up physician provided.     Crisis Hotline:  [place] Crisis Hotline:  6-956-MXAHKGQ or 1-233.929.3937  [place] Crisis Hotline:    1-693.984.3589 or 434-416-9600         ED Discharge Follow  Up Questions    1. In order to provide you with very good care, we would like to follow up with a phone call in the next few days.  May we have your permission to contact you?     YES /  NO    2. What is the best phone number to call you? (       )_____-__________    3. What is the best time to call you?      Morning  /  Afternoon  /  Evening                   Patient Signature:  ____________________________________________________________    Date:  ____________________________________________________________      Your appointments     May 23, 2017  1:00 PM   Telemedicine Clinic New Patient with Brenda Louis M.D., TELEMED GYN, TELEMEDICINE [place]   Centralized Scheduling (--)    [address]  [facility] [address]   [phone]            Jun 07, 2017  9:30 AM   New Patient with Afshin Guardado M.D.   [facility]    [address], [address]  [facility] [address] [phone]           Please bring Photo ID, Insurance Cards, All Medication Bottles and copies of any legal documents (such as Living Will, Power of ) If speaking a language besides English please bring an adult . Please arrive 30 minutes prior for check in and registration. You will be receiving a confirmation call a few days before your appointment from our automated call confirmation system.            Aug 03, 2017  1:30 PM   Follow Up Visit with Maylin Willingham M.D.   [facility] (--)    [address], [address]  [facility] [address]   [phone]           You will be receiving a confirmation call a few days before your appointment from our automated call confirmation system.                        Clothing

## 2024-08-25 NOTE — TELEPHONE ENCOUNTER
Has upcoming appt w/ PCP. Will send 3 months of fills to pharmacy.    
Patient request a 30 day supply of this medication be sent to [facility]. She can not afford the copay for the 90 day supply.   
Was the patient seen in the last year in this department? Yes     Does patient have an active prescription for medications requested? No     Received Request Via: Pharmacy      Pt met protocol?: Yes, OV 5/17. Pt requesting a 30 day supply.     
abdominal pain